# Patient Record
Sex: FEMALE | Race: BLACK OR AFRICAN AMERICAN | Employment: FULL TIME | URBAN - METROPOLITAN AREA
[De-identification: names, ages, dates, MRNs, and addresses within clinical notes are randomized per-mention and may not be internally consistent; named-entity substitution may affect disease eponyms.]

---

## 2020-09-16 ENCOUNTER — VIRTUAL VISIT (OUTPATIENT)
Dept: FAMILY MEDICINE CLINIC | Age: 55
End: 2020-09-16

## 2020-09-16 NOTE — PROGRESS NOTES
Patient thought she was seeing a psychiatrist today. She will keep Dr. Silva Simpson as her PCP. Visit was cancelled.

## 2020-11-19 ENCOUNTER — OFFICE VISIT (OUTPATIENT)
Dept: SURGERY | Age: 55
End: 2020-11-19
Payer: COMMERCIAL

## 2020-11-19 VITALS
BODY MASS INDEX: 40.53 KG/M2 | DIASTOLIC BLOOD PRESSURE: 61 MMHG | TEMPERATURE: 97.1 F | SYSTOLIC BLOOD PRESSURE: 104 MMHG | HEART RATE: 67 BPM | WEIGHT: 237.4 LBS | HEIGHT: 64 IN | OXYGEN SATURATION: 99 %

## 2020-11-19 DIAGNOSIS — E66.01 OBESITY, MORBID (HCC): ICD-10-CM

## 2020-11-19 DIAGNOSIS — L02.212 BACK ABSCESS: Primary | ICD-10-CM

## 2020-11-19 PROCEDURE — 99203 OFFICE O/P NEW LOW 30 MIN: CPT | Performed by: SURGERY

## 2020-11-19 PROCEDURE — 10061 I&D ABSCESS COMP/MULTIPLE: CPT | Performed by: SURGERY

## 2020-11-19 RX ORDER — LISINOPRIL AND HYDROCHLOROTHIAZIDE 20; 25 MG/1; MG/1
TABLET ORAL
COMMUNITY
Start: 2020-10-02

## 2020-11-19 RX ORDER — ACETAMINOPHEN 500 MG
TABLET ORAL
COMMUNITY

## 2020-11-19 RX ORDER — ERGOCALCIFEROL 1.25 MG/1
CAPSULE ORAL
COMMUNITY
Start: 2020-11-07

## 2020-11-19 RX ORDER — HYDROCODONE BITARTRATE AND ACETAMINOPHEN 5; 300 MG/1; MG/1
1 TABLET ORAL
Qty: 12 TAB | Refills: 0 | Status: SHIPPED | OUTPATIENT
Start: 2020-11-19 | End: 2020-11-22

## 2020-11-19 RX ORDER — NAPROXEN 125 MG/5ML
125 SUSPENSION ORAL 2 TIMES DAILY
COMMUNITY
End: 2021-03-31

## 2020-11-19 RX ORDER — THERA TABS 400 MCG
2 TAB ORAL DAILY
COMMUNITY

## 2020-11-19 RX ORDER — ESCITALOPRAM OXALATE 20 MG/1
20 TABLET ORAL
COMMUNITY
End: 2021-08-02

## 2020-11-19 NOTE — PROGRESS NOTES
General Surgery Consult    Reyna Fox  Admit date: (Not on file)    MRN: [id number]     : 1965     Age: 48 y.o. Attending Physician: Ambrose Lorea MD, [facility]      History of Present Illness:      Reyna Fox is a 48 y.o. female who was referred to me for evaluation of a left upper back abscess. The patient has stated that she had a lesion there for about 5 to 6 years or maybe longer most likely representing a sebaceous cyst that has never caused any problem until recently. She said that she have significant pain at the site of the abscess currently and that she went to the doctor at her work at [employer] and he prescribed her antibiotic which she started yesterday. She said that the abscess did not drain at all but it is causing significant pain especially when she touches it. She denies any fever or chills. The patient is morbidly obese. She has been to please prescribe her some narcotics because she is having significant pain and she said that Vicodin works for her.      Patient Active Problem List    Diagnosis Date Noted    Obesity, morbid ([address] 2020    KHLOE on CPAP 2016    Gastroesophageal reflux disease 2016    Primary osteoarthritis involving multiple joints 2016    Obesity (BMI 30-39.9) 2016    Idiopathic chronic gout without tophus 2016    Essential hypertension 2016     Past Medical History:   Diagnosis Date    Arthritis     GERD (gastroesophageal reflux disease)     Gout     Headache     Hypertension     Sleep apnea       Past Surgical History:   Procedure Laterality Date    HX BREAST REDUCTION      HX GYN      tubes tied    HX LAP GASTRIC BYPASS  2017      Social History     Tobacco Use    Smoking status: Never Smoker    Smokeless tobacco: Never Used   Substance Use Topics    Alcohol use: Yes     Comment: occasionally/socially      Social History     Tobacco Use   Smoking Status Never Smoker   Smokeless Tobacco Never Used     Family History   Problem Relation Age of Onset    Diabetes Mother     Hypertension Mother     Elevated Lipids Mother     Diabetes Father     Hypertension Father    Bryan Schwartz Elevated Lipids Father       Current Outpatient Medications   Medication Sig    lisinopril-hydroCHLOROthiazide (PRINZIDE, ZESTORETIC) 20-25 mg per tablet TAKE 1 TABLET BY MOUTH EVERY DAY    therapeutic multivitamin (THERAGRAN) tablet Take 1 Tab by mouth daily.  naproxen (NAPROSYN) 125 mg/5 mL suspension Take 125 mg by mouth two (2) times a day.  acetaminophen (Tylenol Extra Strength) 500 mg tablet Take  by mouth every six (6) hours as needed for Pain.  escitalopram oxalate (Lexapro) 20 mg tablet Take 20 mg by mouth daily.  CALCIUM CARBONATE/VITAMIN D3 (CALCIUM 600 + D,3, PO) Take  by mouth.  allopurinol (ZYLOPRIM) 300 mg tablet Take  by mouth daily.  montelukast (SINGULAIR) 10 mg tablet Take 10 mg by mouth daily.  diphenhydrAMINE (BENADRYL) 50 mg tablet Take 50 mg by mouth nightly as needed for Sleep.  cpap machine kit by Does Not Apply route.  ergocalciferol (ERGOCALCIFEROL) 1,250 mcg (50,000 unit) capsule TAKE ONE CAPSULE BY MOUTH WEEKLY FOR 7 DAYS    allopurinol (ZYLOPRIM) 100 mg tablet Take 1 Tab by mouth daily. Indications: GOUT    traMADol (ULTRAM) 50 mg tablet Take 50 mg by mouth two (2) times a day.  POTASSIUM CHLORIDE (KLOR-CON PO) Take 20 mEq by mouth.  topiramate (TOPAMAX) 25 mg tablet Take 25 mg by mouth daily.  colchicine 0.6 mg tablet Take 0.6 mg by mouth daily.  furosemide (LASIX) 80 mg tablet Take  by mouth daily.  CRANBERRY FRUIT EXTRACT (CRANBERRY CONCENTRATE PO) Take  by mouth.  LORATADINE PO Take 20 mg by mouth daily.  meloxicam (MOBIC) 7.5 mg tablet Take  by mouth daily.  omeprazole (PRILOSEC) 20 mg capsule Take 20 mg by mouth daily.  HYDROcodone-acetaminophen (VICODIN) 5-300 mg tablet Take  by mouth every four (4) hours as needed.     BUTALB/ACETAMINOPHEN/CAFFEINE (FIORICET PO) Take 1 Tab by mouth every six (6) hours as needed.  amLODIPine (NORVASC) 10 mg tablet Take 1 Tab by mouth daily.  lisinopril (PRINIVIL, ZESTRIL) 40 mg tablet Take 1 Tab by mouth daily. No current facility-administered medications for this visit. Allergies   Allergen Reactions    Augmentin [Amoxicillin-Pot Clavulanate] Diarrhea    Bactrim [Sulfamethoprim Ss] Itching          Review of Systems:  Constitutional: negative  Eyes: negative  Ears, Nose, Mouth, Throat, and Face: negative  Respiratory: negative  Cardiovascular: negative  Gastrointestinal: negative  Genitourinary:negative  Integument/Breast: positive for Back abscess  Hematologic/Lymphatic: negative  Musculoskeletal:negative  Neurological: negative  Behavioral/Psychiatric: negative  Endocrine: negative  Allergic/Immunologic: negative    Objective:     Visit Vitals  /61 (BP 1 Location: Right arm, BP Patient Position: Sitting)   Pulse 67   Temp 97.1 °F (36.2 °C) (Skin)   Ht 5' 4\" (1.626 m)   Wt 107.7 kg (237 lb 6.4 oz)   SpO2 99%   BMI 40.75 kg/m²       Physical Exam:      General:  in no apparent distress, alert, oriented times 3, afebrile and normal vitals   Eyes:  conjunctivae and sclerae normal, pupils equal, round, reactive to light   Throat & Neck: no erythema or exudates noted and neck supple and symmetrical; no palpable masses   Lungs:   clear to auscultation bilaterally   Heart:  Regular rate and rhythm   Abdomen:   rounded and obese, soft, nontender, nondistended, no masses or organomegaly   Extremities: extremities normal, atraumatic, no cyanosis or edema   back: There is a abscess that is localized in the left upper back that is tender with fluctuation and overlying skin erythema. It is very tender to palpation. There is no drainage.        Imaging and Lab Review:     CBC:   Lab Results   Component Value Date/Time    WBC 10.8 08/24/2016 12:34 PM    RBC 4.35 08/24/2016 12:34 PM HGB 12.8 08/24/2016 12:34 PM    HCT 38.8 08/24/2016 12:34 PM    PLATELET 376 15/62/7563 12:34 PM     BMP:   Lab Results   Component Value Date/Time    Glucose 94 10/06/2016 10:36 AM    Sodium 141 10/06/2016 10:36 AM    Potassium 4.2 10/06/2016 10:36 AM    Chloride 102 10/06/2016 10:36 AM    CO2 25 10/06/2016 10:36 AM    BUN 24 (H) 10/06/2016 10:36 AM    Creatinine 0.8 10/06/2016 10:36 AM    Calcium 9.7 10/06/2016 10:36 AM     CMP:  Lab Results   Component Value Date/Time    Glucose 94 10/06/2016 10:36 AM    Sodium 141 10/06/2016 10:36 AM    Potassium 4.2 10/06/2016 10:36 AM    Chloride 102 10/06/2016 10:36 AM    CO2 25 10/06/2016 10:36 AM    BUN 24 (H) 10/06/2016 10:36 AM    Creatinine 0.8 10/06/2016 10:36 AM    Calcium 9.7 10/06/2016 10:36 AM    Anion gap 14.0 10/06/2016 10:36 AM    Alk. phosphatase 62 10/06/2016 10:36 AM    Protein, total 6.8 10/06/2016 10:36 AM    Albumin 4.0 10/06/2016 10:36 AM    Globulin 2.8 10/06/2016 10:36 AM    A-G Ratio 1.4 10/06/2016 10:36 AM       No results found for this or any previous visit (from the past 24 hour(s)). images and reports reviewed    Assessment:   Reyna Fox is a 48 y.o. female who is presenting with a large left upper back abscess most likely representing a sebaceous cyst that is infected since the patient had a lump there for years. The patient was already started on antibiotic yesterday but this need to be drained. We can do it under local anesthesia in the office. I explained to the patient about the surgery and the need for packing on a daily basis which she agreed.      Plan:     I did incision and drainage of a back abscess in the office    Please call me if you have any questions (cell phone: [phone])     Signed By: Ambrose Loera MD     November 19, 2020

## 2020-11-19 NOTE — PROGRESS NOTES
A consent was taken from the patient  A timeout was performed  The area was infiltrated with 1% lidocaine  A skin incision was performed with 11 blade and pus was drained  The area was irrigated multiple times and we drained as much as possible and then packed with a gauze

## 2020-11-19 NOTE — PROGRESS NOTES
Merlinda Lather is a 48 y.o. female (: 1965) presenting to address:    Chief Complaint   Patient presents with    New Patient     Abscess on back       Medication list and allergies have been reviewed with Merlinda Lather and updated as of today's date. I have gone over all Medical, Surgical and Social History with Toniarhiannon Chente and updated/added the information accordingly.

## 2020-11-30 ENCOUNTER — OFFICE VISIT (OUTPATIENT)
Dept: SURGERY | Age: 55
End: 2020-11-30
Payer: COMMERCIAL

## 2020-11-30 VITALS
OXYGEN SATURATION: 100 % | BODY MASS INDEX: 40.39 KG/M2 | SYSTOLIC BLOOD PRESSURE: 109 MMHG | DIASTOLIC BLOOD PRESSURE: 66 MMHG | HEART RATE: 55 BPM | TEMPERATURE: 97.1 F | HEIGHT: 64 IN | WEIGHT: 236.6 LBS

## 2020-11-30 DIAGNOSIS — L02.212 BACK ABSCESS: ICD-10-CM

## 2020-11-30 DIAGNOSIS — E66.01 OBESITY, MORBID (HCC): Primary | ICD-10-CM

## 2020-11-30 PROCEDURE — 99024 POSTOP FOLLOW-UP VISIT: CPT | Performed by: SURGERY

## 2020-11-30 NOTE — PROGRESS NOTES
Jed Banda is a 48 y.o. female (: 1965) presenting to address:    Chief Complaint   Patient presents with    Surgical Follow-up     Incision and Drainage of back cyst in office 20       Medication list and allergies have been reviewed with Jed Banda and updated as of today's date. I have gone over all Medical, Surgical and Social History with Jed Banda and updated/added the information accordingly. 1. Have you been to the ER, Urgent Care or Hospitalized since your last visit? NO      2. Have you followed up with your PCP or any other Physicians since your procedure/ last office visit?    NO

## 2020-11-30 NOTE — PROGRESS NOTES
Patient seen and examined she is doing relatively well. She has stated that there is some itching around the wound. She has been packing it every day. She said her sister or her coworkers are doing it. She denies any fever or chills. On exam the area has almost healed completely with a great granulation tissue and the opening is less than half centimeter and less than 3 to 4 mm in depth. There is no induration or fluctuation. There is no tenderness on exam.  I reassured the patient that the healing is great and she need to continue with the packing though most likely in less than a week it can be difficult to pack and there is no need to pack anymore.   Follow-up with me as needed

## 2020-12-08 ENCOUNTER — OFFICE VISIT (OUTPATIENT)
Dept: ORTHOPEDIC SURGERY | Age: 55
End: 2020-12-08
Payer: COMMERCIAL

## 2020-12-08 VITALS
TEMPERATURE: 97.1 F | SYSTOLIC BLOOD PRESSURE: 111 MMHG | DIASTOLIC BLOOD PRESSURE: 77 MMHG | HEART RATE: 58 BPM | BODY MASS INDEX: 40.53 KG/M2 | RESPIRATION RATE: 16 BRPM | OXYGEN SATURATION: 100 % | WEIGHT: 237.4 LBS | HEIGHT: 64 IN

## 2020-12-08 DIAGNOSIS — M25.562 ACUTE PAIN OF LEFT KNEE: ICD-10-CM

## 2020-12-08 DIAGNOSIS — M17.12 PRIMARY OSTEOARTHRITIS OF LEFT KNEE: Primary | ICD-10-CM

## 2020-12-08 PROCEDURE — 20611 DRAIN/INJ JOINT/BURSA W/US: CPT | Performed by: ORTHOPAEDIC SURGERY

## 2020-12-08 PROCEDURE — 73560 X-RAY EXAM OF KNEE 1 OR 2: CPT | Performed by: ORTHOPAEDIC SURGERY

## 2020-12-08 PROCEDURE — 99203 OFFICE O/P NEW LOW 30 MIN: CPT | Performed by: ORTHOPAEDIC SURGERY

## 2020-12-08 RX ORDER — TRIAMCINOLONE ACETONIDE 40 MG/ML
40 INJECTION, SUSPENSION INTRA-ARTICULAR; INTRAMUSCULAR ONCE
Status: COMPLETED | OUTPATIENT
Start: 2020-12-08 | End: 2020-12-08

## 2020-12-08 RX ADMIN — TRIAMCINOLONE ACETONIDE 40 MG: 40 INJECTION, SUSPENSION INTRA-ARTICULAR; INTRAMUSCULAR at 11:09

## 2020-12-08 NOTE — PROGRESS NOTES
Natividad Rod  1965   Chief Complaint   Patient presents with    Knee Pain     left knee pain        HISTORY OF PRESENT ILLNESS  Natividad Rod is a 48 y.o. female who presents today for evaluation of left knee pain. She rates her pain 4/10 today. Pain has been present for a couple of months. Pt was doing \"zig-zag\" walking on the track when her knee moved out from under her. Notes swelling and giving way sensation. Has had previous knee injections by rheumatologist around 3 years ago. Patient describes the pain as aching and popping and locking that is Intermittent in nature. Symptoms are worse with stairs, walking, standing, bending, Activity and is better with  Tylenol and Advil, Ice, Elevation. Associated symptoms include Swelling. Since problem started, it: is unchanged. Pain does not wake patient up at night. Has taken no meds for the problem. Has tried following treatments: Injections:NO; Brace:NO;  Therapy:NO; Cane/Crutch:NO       Allergies   Allergen Reactions    Augmentin [Amoxicillin-Pot Clavulanate] Diarrhea    Bactrim [Sulfamethoprim Ss] Itching        Past Medical History:   Diagnosis Date    Arthritis     GERD (gastroesophageal reflux disease)     Gout     Headache     Hypertension     Sleep apnea       Social History     Socioeconomic History    Marital status:      Spouse name: Not on file    Number of children: Not on file    Years of education: Not on file    Highest education level: Not on file   Occupational History    Not on file   Social Needs    Financial resource strain: Not on file    Food insecurity     Worry: Not on file     Inability: Not on file    Transportation needs     Medical: Not on file     Non-medical: Not on file   Tobacco Use    Smoking status: Never Smoker    Smokeless tobacco: Never Used   Substance and Sexual Activity    Alcohol use: Yes     Comment: occasionally/socially    Drug use: No    Sexual activity: Yes     Partners: Male Lifestyle    Physical activity     Days per week: Not on file     Minutes per session: Not on file    Stress: Not on file   Relationships    Social connections     Talks on phone: Not on file     Gets together: Not on file     Attends Tenriism service: Not on file     Active member of club or organization: Not on file     Attends meetings of clubs or organizations: Not on file     Relationship status: Not on file    Intimate partner violence     Fear of current or ex partner: Not on file     Emotionally abused: Not on file     Physically abused: Not on file     Forced sexual activity: Not on file   Other Topics Concern    Not on file   Social History Narrative    Not on file      Past Surgical History:   Procedure Laterality Date    HX BREAST REDUCTION  2010    HX GYN      tubes tied    HX LAP GASTRIC BYPASS  2017    I&D ABCESS SIMP Left 11/24/2020    I and D of left back abscess      Family History   Problem Relation Age of Onset    Diabetes Mother     Hypertension Mother     Elevated Lipids Mother     Diabetes Father     Hypertension Father     Elevated Lipids Father       Current Outpatient Medications   Medication Sig    ergocalciferol (ERGOCALCIFEROL) 1,250 mcg (50,000 unit) capsule TAKE ONE CAPSULE BY MOUTH WEEKLY FOR 7 DAYS    lisinopril-hydroCHLOROthiazide (PRINZIDE, ZESTORETIC) 20-25 mg per tablet TAKE 1 TABLET BY MOUTH EVERY DAY    naproxen (NAPROSYN) 125 mg/5 mL suspension Take 125 mg by mouth two (2) times a day.  acetaminophen (Tylenol Extra Strength) 500 mg tablet Take  by mouth every six (6) hours as needed for Pain.  allopurinol (ZYLOPRIM) 100 mg tablet Take 1 Tab by mouth daily. Indications: GOUT    furosemide (LASIX) 80 mg tablet Take  by mouth daily.  montelukast (SINGULAIR) 10 mg tablet Take 10 mg by mouth daily.  cpap machine kit by Does Not Apply route.  lisinopril (PRINIVIL, ZESTRIL) 40 mg tablet Take 1 Tab by mouth daily.     therapeutic multivitamin [facility]) tablet Take 1 Tab by mouth daily.  escitalopram oxalate (Lexapro) 20 mg tablet Take 20 mg by mouth daily.  traMADol (ULTRAM) 50 mg tablet Take 50 mg by mouth two (2) times a day.  POTASSIUM CHLORIDE (KLOR-CON PO) Take 20 mEq by mouth.  topiramate (TOPAMAX) 25 mg tablet Take 25 mg by mouth daily.  CALCIUM CARBONATE/VITAMIN D3 (CALCIUM 600 + D,3, PO) Take  by mouth.  colchicine 0.6 mg tablet Take 0.6 mg by mouth daily.  allopurinol (ZYLOPRIM) 300 mg tablet Take  by mouth daily.  CRANBERRY FRUIT EXTRACT (CRANBERRY CONCENTRATE PO) Take  by mouth.  LORATADINE PO Take 20 mg by mouth daily.  meloxicam (MOBIC) 7.5 mg tablet Take  by mouth daily.  omeprazole (PRILOSEC) 20 mg capsule Take 20 mg by mouth daily.  HYDROcodone-acetaminophen (VICODIN) 5-300 mg tablet Take  by mouth every four (4) hours as needed.  BUTALB/ACETAMINOPHEN/CAFFEINE (FIORICET PO) Take 1 Tab by mouth every six (6) hours as needed.  diphenhydrAMINE (BENADRYL) 50 mg tablet Take 50 mg by mouth nightly as needed for Sleep.  amLODIPine (NORVASC) 10 mg tablet Take 1 Tab by mouth daily. No current facility-administered medications for this visit. REVIEW OF SYSTEM   Patient denies: Weight loss, Fever/Chills, HA, Visual changes, Fatigue, Chest pain, SOB, Abdominal pain, N/V/D/C, Blood in stool or urine, Edema. Pertinent positive as above in HPI. All others were negative    PHYSICAL EXAM:   Visit Vitals  /77 (BP 1 Location: Right arm, BP Patient Position: Sitting)   Pulse (!) 58   Temp 97.1 °F (36.2 °C) (Temporal)   Resp 16   Ht 5' 4\" (1.626 m)   Wt 237 lb 6.4 oz (107.7 kg)   SpO2 100%   BMI 40.75 kg/m²     The patient is a well-developed, well-nourished female   in no acute distress. The patient is alert and oriented times three. The patient is alert and oriented times three.  Mood and affect are normal.  LYMPHATIC: lymph nodes are not enlarged and are within normal limits  SKIN: normal in color and non tender to palpation. There are no bruises or abrasions noted. NEUROLOGICAL: Motor sensory exam is within normal limits. Reflexes are equal bilaterally. There is normal sensation to pinprick and light touch  MUSCULOSKELETAL:  Examination Left knee   Skin Intact   Range of motion    Effusion +   Medial joint line tenderness +   Lateral joint line tenderness -   Tenderness Pes Bursa -   Tenderness insertion MCL -   Tenderness insertion LCL -   Ladis +   Patella crepitus +   Patella grind +   Lachman -   Pivot shift -   Anterior drawer -   Posterior drawer -   Varus stress -   Valgus stress -   Neurovascular Intact   Calf Swelling and Tenderness to Palpation -   Faith's Test -   Hamstring Cord Tightness -       PROCEDURE: Left Knee Injection with Ultrasound Guidance  Indication:Left Knee pain/swelling    After sterile prep, 4 cc of Xylocaine and 1 cc of Kenalog were injected into the left knee. Ultrasound images captured using [employer] Ultrasound machine and scanned into patient's chart.        [facility] - [facility]  OFFICE PROCEDURE PROGRESS NOTE        Chart reviewed for the following:  Sulema Monge M.D, have reviewed the History, Physical and updated the Allergic reactions for [facility] performed immediately prior to start of procedure:  Sulema Monge M.D, have performed the following reviews on [facility] prior to the start of the procedure:            * Patient was identified by name and date of birth   * Agreement on procedure being performed was verified  * Risks and Benefits explained to the patient  * Procedure site verified and marked as necessary  * Patient was positioned for comfort  * Consent was signed and verified     Time: 10:07 AM     Date of procedure: 12/8/2020    Procedure performed by:  Andrzej Delgado M.D    Provider assisted by: (see medication administration)    How tolerated by patient: tolerated the procedure well with no complications    Comments: none      IMAGING: XR of left knee obtained in the office dated 12/08/2020 was reviewed and read by Dr. Romero Perdomo: Marked degenerative arthritis in the medial compartment. IMPRESSION:      ICD-10-CM ICD-9-CM    1. Primary osteoarthritis of left knee  M17.12 715.16 triamcinolone acetonide (KENALOG-40) 40 mg/mL injection 40 mg      US GUIDE INJ/ASP/ARTHRO LG JNT/BURSA   2. Acute pain of left knee  M25.562 719.46 AMB POC XRAY, KNEE; 1/2 VIEWS        PLAN:  1. Pt presents today with left knee pain due to primary OA and I would like to try a cortisone injection today. Risk factors include: htn, BMI>40   2. No ultrasound exam indicated today  3. Yes cortisone injection indicated today L KNEE US  4. No Physical/Occupational Therapy indicated today  5. No diagnostic test indicated today:   6. No durable medical equipment indicated today  7. No referral indicated today   8. No medications indicated today:   9. No Narcotic indicated today       RTC 3 weeks if pain continues      Scribed by Courtney Ornelas) as dictated by Dory Villalobos MD    I, Dr. Dory Villalobos, confirm that all documentation is accurate.     Dory Villalobos M.D.   [facility]

## 2021-03-31 ENCOUNTER — HOSPITAL ENCOUNTER (OUTPATIENT)
Dept: LAB | Age: 56
Discharge: HOME | End: 2021-03-31
Payer: COMMERCIAL

## 2021-03-31 PROCEDURE — U0003 INFECTIOUS AGENT DETECTION BY NUCLEIC ACID (DNA OR RNA); SEVERE ACUTE RESPIRATORY SYNDROME CORONAVIRUS 2 (SARS-COV-2) (CORONAVIRUS DISEASE [COVID-19]), AMPLIFIED PROBE TECHNIQUE, MAKING USE OF HIGH THROUGHPUT TECHNOLOGIES AS DESCRIBED BY CMS-2020-01-R: HCPCS

## 2021-03-31 RX ORDER — ESCITALOPRAM OXALATE 10 MG/1
10 TABLET ORAL
COMMUNITY
End: 2021-08-02

## 2021-03-31 RX ORDER — DEXTROAMPHETAMINE SULFATE 10 MG/1
10 TABLET ORAL DAILY
COMMUNITY
End: 2021-08-02

## 2021-03-31 RX ORDER — POLYETHYLENE GLYCOL 3350 17 G/17G
17 POWDER, FOR SOLUTION ORAL DAILY
COMMUNITY

## 2021-03-31 RX ORDER — CETIRIZINE HCL 10 MG
10 TABLET ORAL DAILY
COMMUNITY

## 2021-03-31 RX ORDER — PHENTERMINE HYDROCHLORIDE 30 MG/1
30 CAPSULE ORAL
COMMUNITY
End: 2021-08-02

## 2021-04-02 ENCOUNTER — ANESTHESIA EVENT (OUTPATIENT)
Dept: ENDOSCOPY | Age: 56
End: 2021-04-02
Payer: COMMERCIAL

## 2021-04-02 LAB — SARS-COV-2, COV2NT: NOT DETECTED

## 2021-04-05 ENCOUNTER — HOSPITAL ENCOUNTER (OUTPATIENT)
Age: 56
Setting detail: OUTPATIENT SURGERY
Discharge: HOME | End: 2021-04-05
Attending: INTERNAL MEDICINE | Admitting: INTERNAL MEDICINE
Payer: COMMERCIAL

## 2021-04-05 ENCOUNTER — ANESTHESIA (OUTPATIENT)
Dept: ENDOSCOPY | Age: 56
End: 2021-04-05
Payer: COMMERCIAL

## 2021-04-05 VITALS
BODY MASS INDEX: 41.57 KG/M2 | HEART RATE: 70 BPM | RESPIRATION RATE: 18 BRPM | WEIGHT: 234.6 LBS | OXYGEN SATURATION: 100 % | HEIGHT: 63 IN | SYSTOLIC BLOOD PRESSURE: 100 MMHG | TEMPERATURE: 98.5 F | DIASTOLIC BLOOD PRESSURE: 68 MMHG

## 2021-04-05 LAB — HCG UR QL: NEGATIVE

## 2021-04-05 PROCEDURE — 76040000019: Performed by: INTERNAL MEDICINE

## 2021-04-05 PROCEDURE — 74011250636 HC RX REV CODE- 250/636: Performed by: NURSE ANESTHETIST, CERTIFIED REGISTERED

## 2021-04-05 PROCEDURE — 2709999900 HC NON-CHARGEABLE SUPPLY: Performed by: INTERNAL MEDICINE

## 2021-04-05 PROCEDURE — 76060000031 HC ANESTHESIA FIRST 0.5 HR: Performed by: INTERNAL MEDICINE

## 2021-04-05 PROCEDURE — 00813 ANES UPR LWR GI NDSC PX: CPT | Performed by: ANESTHESIOLOGY

## 2021-04-05 PROCEDURE — 74011250636 HC RX REV CODE- 250/636: Performed by: ANESTHESIOLOGY

## 2021-04-05 PROCEDURE — 81025 URINE PREGNANCY TEST: CPT

## 2021-04-05 PROCEDURE — 77030008565 HC TBNG SUC IRR ERBE -B: Performed by: INTERNAL MEDICINE

## 2021-04-05 PROCEDURE — 74011000250 HC RX REV CODE- 250: Performed by: ANESTHESIOLOGY

## 2021-04-05 RX ORDER — FAMOTIDINE 20 MG/1
20 TABLET, FILM COATED ORAL ONCE
Status: DISCONTINUED | OUTPATIENT
Start: 2021-04-05 | End: 2021-04-05 | Stop reason: CLARIF

## 2021-04-05 RX ORDER — SODIUM CHLORIDE, SODIUM LACTATE, POTASSIUM CHLORIDE, CALCIUM CHLORIDE 600; 310; 30; 20 MG/100ML; MG/100ML; MG/100ML; MG/100ML
75 INJECTION, SOLUTION INTRAVENOUS CONTINUOUS
Status: DISCONTINUED | OUTPATIENT
Start: 2021-04-05 | End: 2021-04-05 | Stop reason: HOSPADM

## 2021-04-05 RX ORDER — PROPOFOL 10 MG/ML
INJECTION, EMULSION INTRAVENOUS AS NEEDED
Status: DISCONTINUED | OUTPATIENT
Start: 2021-04-05 | End: 2021-04-05 | Stop reason: HOSPADM

## 2021-04-05 RX ORDER — SODIUM CHLORIDE 0.9 % (FLUSH) 0.9 %
5-40 SYRINGE (ML) INJECTION EVERY 8 HOURS
Status: DISCONTINUED | OUTPATIENT
Start: 2021-04-05 | End: 2021-04-05 | Stop reason: HOSPADM

## 2021-04-05 RX ORDER — SODIUM CHLORIDE 0.9 % (FLUSH) 0.9 %
5-40 SYRINGE (ML) INJECTION AS NEEDED
Status: DISCONTINUED | OUTPATIENT
Start: 2021-04-05 | End: 2021-04-05 | Stop reason: HOSPADM

## 2021-04-05 RX ADMIN — FAMOTIDINE 20 MG: 10 INJECTION INTRAVENOUS at 08:12

## 2021-04-05 RX ADMIN — PROPOFOL 100 MG: 10 INJECTION, EMULSION INTRAVENOUS at 08:36

## 2021-04-05 RX ADMIN — PROPOFOL 100 MG: 10 INJECTION, EMULSION INTRAVENOUS at 08:38

## 2021-04-05 RX ADMIN — SODIUM CHLORIDE, SODIUM LACTATE, POTASSIUM CHLORIDE, AND CALCIUM CHLORIDE 75 ML/HR: 600; 310; 30; 20 INJECTION, SOLUTION INTRAVENOUS at 08:12

## 2021-04-05 RX ADMIN — PROPOFOL 50 MG: 10 INJECTION, EMULSION INTRAVENOUS at 08:31

## 2021-04-05 NOTE — ANESTHESIA POSTPROCEDURE EVALUATION
Procedure(s):  UPPER ENDOSCOPY  COLONOSCOPY. MAC    Anesthesia Post Evaluation      Multimodal analgesia: multimodal analgesia used between 6 hours prior to anesthesia start to PACU discharge  Patient location during evaluation: bedside  Patient participation: complete - patient participated  Level of consciousness: awake  Pain management: adequate  Airway patency: patent  Anesthetic complications: no  Cardiovascular status: stable  Respiratory status: acceptable  Hydration status: acceptable  Post anesthesia nausea and vomiting:  controlled  Final Post Anesthesia Temperature Assessment:  Normothermia (36.0-37.5 degrees C)      INITIAL Post-op Vital signs: No vitals data found for the desired time range.

## 2021-04-05 NOTE — H&P
Chief Complaint: Epigastric pain and CRCS    History of present illness: History of gastric bypass. Pain epigastrium. Localized no NSAIDs use. No vomiting. History of long standing heartburn. PMH:   Past Medical History:   Diagnosis Date    Arthritis     GERD (gastroesophageal reflux disease)     Gout     Headache     Hypertension     Sleep apnea     Uses CPAP     Allergies:    Allergies   Allergen Reactions    Augmentin [Amoxicillin-Pot Clavulanate] Diarrhea    Bactrim [Sulfamethoprim Ss] Itching     Medications:   Current Facility-Administered Medications:     sodium chloride (NS) flush 5-40 mL, 5-40 mL, IntraVENous, Q8H, Micha Love CRNA    sodium chloride (NS) flush 5-40 mL, 5-40 mL, IntraVENous, PRN, Miosés Sherman CRNA    lactated Ringers infusion, 75 mL/hr, IntraVENous, CONTINUOUS, Moisés Sherman CRNA, Last Rate: 75 mL/hr at 04/05/21 0812, 75 mL/hr at 04/05/21 0812  FH:   Family History   Problem Relation Age of Onset    Diabetes Mother     Hypertension Mother    [facility] Elevated Lipids Mother     Diabetes Father     Hypertension Father    [facility] Elevated Lipids Father      Social:   Social History     Socioeconomic History    Marital status:      Spouse name: Not on file    Number of children: Not on file    Years of education: Not on file    Highest education level: Not on file   Tobacco Use    Smoking status: Never Smoker    Smokeless tobacco: Never Used   Substance and Sexual Activity    Alcohol use: Yes     Comment: occasionally/socially    Drug use: Never    Sexual activity: Yes     Partners: Male     Surgical H:   Past Surgical History:   Procedure Laterality Date    HX BREAST REDUCTION  2010    HX GASTRIC BYPASS  10/2017    HX LAP GASTRIC BYPASS  2017    HX TUBAL LIGATION      I&D ABCESS SIMP Left 11/24/2020    I and D of left back abscess       ROS: positive for dyspepsia, reflux symptoms, constipation and abdominal pain    Physical Exam:   Visit Vitals  BP 117/76   Pulse 70   Temp 98.4 °F (36.9 °C)   Resp 14   Ht 5' 3\" (1.6 m)   Wt 106.4 kg (234 lb 9.6 oz)   SpO2 99%   Breastfeeding No   BMI 41.56 kg/m²     General appearance: alert, no distress overweight  Eyes: pupils equal and reactive, extraocular eye movements intact  Nodes: No gross adenopathy in neck. Skin: no spider angiomata, jaundice, palmar erythema   Respiratory: clear to auscultation bilaterally  Cardiovascular: regular heart rate, no murmurs, no JVD, normal rate and regular rhythm  Abdomen: soft, non-tender, liver not enlarged, spleen not palpable, no obvious ascites  Extremities: no muscle wasting, no gross arthritic changes  Neurologic: alert and oriented, cranial nerves grossly intact, no asterixis    Labs:   Recent Results (from the past 24 hour(s))   HCG URINE, QL. - POC    Collection Time: 04/05/21  7:17 AM   Result Value Ref Range    Pregnancy test,urine (POC) Negative NEG           Imp/ Plan: Will proceed with EGD and colonoscopy as planned. Risk benefits alternative including but not limited to infection, bleeding, perforation of viscous, allergic reaction and resultant morbidity and mortality was discussed. Chance of missing a significant lesion due to various reasons were discussed.       Pedro Bess MD  [facility] 1946, [address]

## 2021-04-05 NOTE — ANESTHESIA PREPROCEDURE EVALUATION
Relevant Problems   No relevant active problems       Anesthetic History   No history of anesthetic complications            Review of Systems / Medical History  Patient summary reviewed, nursing notes reviewed and pertinent labs reviewed    Pulmonary        Sleep apnea           Neuro/Psych   Within defined limits           Cardiovascular    Hypertension              Exercise tolerance: >4 METS     GI/Hepatic/Renal     GERD           Endo/Other        Morbid obesity and arthritis     Other Findings              Physical Exam    Airway  Mallampati: II  TM Distance: 4 - 6 cm  Neck ROM: normal range of motion   Mouth opening: Normal     Cardiovascular  Regular rate and rhythm,  S1 and S2 normal,  no murmur, click, rub, or gallop  Rhythm: regular  Rate: normal         Dental  No notable dental hx       Pulmonary  Breath sounds clear to auscultation               Abdominal  GI exam deferred       Other Findings            Anesthetic Plan    ASA: 3  Anesthesia type: MAC          Induction: Intravenous  Anesthetic plan and risks discussed with: Patient

## 2021-04-05 NOTE — DISCHARGE INSTRUCTIONS
Patient Education        Esophagitis: Care Instructions  Your Care Instructions     Esophagitis (say \"ih-sof-uh-JY-tus\") is irritation of the esophagus, the tube that carries food from your throat to your stomach. Acid reflux is the most common cause of this condition. When you have reflux, stomach acid and juices flow upward. This can cause pain or a burning feeling in your chest. You may have a sore throat. It may be hard to swallow. Other causes of this condition include some medicines and supplements. Allergies or an infection can also cause it. Your doctor will ask about your symptoms and past health. He or she might do tests to find the cause of your symptoms. Treatment depends on what is causing the problem. Treatment might include changing your diet or taking medicine to relieve your symptoms. It might also include changing a medicine that is causing your symptoms. If you have reflux, medicine that reduces the stomach acid helps your body heal. It might take 1 to 3 weeks to heal.  Follow-up care is a key part of your treatment and safety. Be sure to make and go to all appointments, and call your doctor if you are having problems. It's also a good idea to know your test results and keep a list of the medicines you take. How can you care for yourself at home? · If you have acid reflux, your doctor may recommend that you:  ? Eat several small meals instead of two or three large meals. After you eat, wait 2 to 3 hours before you lie down. ? Avoid chocolate, mint, alcohol, and spicy foods. ? Don't smoke or use smokeless tobacco. Smoking can make this condition worse. If you need help quitting, talk to your doctor about stop-smoking programs and medicines. These can increase your chances of quitting for good. ? Raise the head of your bed 6 to 8 inches if you have symptoms at night. ? Lose weight if you are overweight. ? Take an over-the-counter antacid, such as Maalox, Mylanta, or Tums.  Be careful when you take over-the-counter antacid medicines. Many of these medicines have aspirin in them. Read the label to make sure that you are not taking more than the recommended dose. Too much aspirin can be harmful. ? Take stronger acid reducers. Examples are famotidine (such as Pepcid) and omeprazole (such as Prilosec). · If your condition is caused by infection, allergy, or other problems, use the medicine or treatments that your doctor recommends. · Be safe with medicines. Take your medicines exactly as prescribed. Call your doctor if you think you are having a problem with your medicine. When should you call for help? Call your doctor now or seek immediate medical care if:    · You have new or worse belly pain.     · You are vomiting. Watch closely for changes in your health, and be sure to contact your doctor if:    · You have new or worse symptoms of reflux.     · You have trouble or pain swallowing.     · You are losing weight.     · You do not get better as expected. Where can you learn more? Go to http://www.gray.com/  Enter N977 in the search box to learn more about \"Esophagitis: Care Instructions. \"  Current as of: April 15, 2020               Content Version: 12.8  © 8398-7295 [employer]. Care instructions adapted under license by [employer] (which disclaims liability or warranty for this information). If you have questions about a medical condition or this instruction, always ask your healthcare professional. [place] [address] any warranty or liability for your use of this information. Patient Education        Hiatal Hernia: Care Instructions  Your Care Instructions  A hiatal hernia occurs when part of the stomach bulges into the chest cavity. A hiatal hernia may allow stomach acid and juices to back up into the esophagus (acid reflux). This can cause a feeling of burning, warmth, heat, or pain behind the breastbone.  This feeling may often occur after you eat, soon after you lie down, or when you bend forward, and it may come and go. You also may have a sour taste in your mouth. These symptoms are commonly known as heartburn or reflux. But not all hiatal hernias cause symptoms. Follow-up care is a key part of your treatment and safety. Be sure to make and go to all appointments, and call your doctor if you are having problems. It's also a good idea to know your test results and keep a list of the medicines you take. How can you care for yourself at home? · Take your medicines exactly as prescribed. Call your doctor if you think you are having a problem with your medicine. · Do not take aspirin or other nonsteroidal anti-inflammatory drugs (NSAIDs), such as ibuprofen (Advil, Motrin) or naproxen (Aleve), unless your doctor says it is okay. Ask your doctor what you can take for pain. · Your doctor may recommend over-the-counter medicine. For mild or occasional indigestion, antacids such as Tums, Gaviscon, Maalox, or Mylanta may help. Your doctor also may recommend over-the-counter acid reducers, such as famotidine (Pepcid AC), cimetidine (Tagamet HB), or omeprazole (Prilosec). Read and follow all instructions on the label. If you use these medicines often, talk with your doctor. · Change your eating habits. ? It's best to eat several small meals instead of two or three large meals. ? After you eat, wait 2 to 3 hours before you lie down. Late-night snacks aren't a good idea. ? Chocolate, mint, and alcohol can make heartburn worse. They relax the valve between the esophagus and the stomach. ? Spicy foods, foods that have a lot of acid (like tomatoes and oranges), and coffee can make heartburn symptoms worse in some people. If your symptoms are worse after you eat a certain food, you may want to stop eating that food to see if your symptoms get better.   · Do not smoke or chew tobacco.  · If you get heartburn at night, raise the head of your bed 6 to 8 inches by putting the frame on blocks or placing a foam wedge under the head of your mattress. (Adding extra pillows does not work.)  · Do not wear tight clothing around your middle. · Lose weight if you need to. Losing just 5 to 10 pounds can help. When should you call for help? Call your doctor now or seek immediate medical care if:    · You have new or worse belly pain.     · You are vomiting. Watch closely for changes in your health, and be sure to contact your doctor if:    · You have new or worse symptoms of indigestion.     · You have trouble or pain swallowing.     · You are losing weight.     · You do not get better as expected. Where can you learn more? Go to http://www.moore.com/  Enter T074 in the search box to learn more about \"Hiatal Hernia: Care Instructions. \"  Current as of: April 15, 2020               Content Version: 12.8  © 2006-2021 [employer]. Care instructions adapted under license by [employer] (which disclaims liability or warranty for this information). If you have questions about a medical condition or this instruction, always ask your healthcare professional. [place] [address] any warranty or liability for your use of this information. Patient Education        Colon Polyps: Care Instructions  Your Care Instructions     Colon polyps are growths in the colon or the rectum. The cause of most colon polyps is not known, and most people who get them do not have any problems. But a certain kind can turn into cancer. For this reason, regular testing for colon polyps is important for people as they get older. It is also important for anyone who has an increased risk for colon cancer. Polyps are usually found through routine colon cancer screening tests. Although most colon polyps are not cancerous, they are usually removed and then tested for cancer.  Screening for colon cancer saves lives because the cancer can usually be cured if it is caught early. If you have a polyp that is the type that can turn into cancer, you may need more tests to examine your entire colon. The doctor will remove any other polyps that he or she finds, and you will be tested more often. Follow-up care is a key part of your treatment and safety. Be sure to make and go to all appointments, and call your doctor if you are having problems. It's also a good idea to know your test results and keep a list of the medicines you take. How can you care for yourself at home? Regular exams to look for colon polyps are the best way to prevent polyps from turning into colon cancer. These can include stool tests, sigmoidoscopy, colonoscopy, and CT colonography. Talk with your doctor about a testing schedule that is right for you. To prevent polyps  There is no home treatment that can prevent colon polyps. But these steps may help lower your risk for cancer. · Stay active. Being active can help you get to and stay at a healthy weight. Try to exercise on most days of the week. Walking is a good choice. · Eat well. Choose a variety of vegetables, fruits, legumes (such as peas and beans), fish, poultry, and whole grains. · Do not smoke. If you need help quitting, talk to your doctor about stop-smoking programs and medicines. These can increase your chances of quitting for good. · If you drink alcohol, limit how much you drink. Limit alcohol to 2 drinks a day for men and 1 drink a day for women. When should you call for help? Call your doctor now or seek immediate medical care if:    · You have severe belly pain.     · Your stools are maroon or very bloody. Watch closely for changes in your health, and be sure to contact your doctor if:    · You have a fever.     · You have nausea or vomiting.     · You have a change in bowel habits (new constipation or diarrhea).     · Your symptoms get worse or are not improving as expected. Where can you learn more? Go to http://www.zeenworld.com/  Enter C571 in the search box to learn more about \"Colon Polyps: Care Instructions. \"  Current as of: December 17, 2020               Content Version: 12.8  © 8999-9469 [employer]. Care instructions adapted under license by [employer] (which disclaims liability or warranty for this information). If you have questions about a medical condition or this instruction, always ask your healthcare professional. [place] [address] any warranty or liability for your use of this information.        .Patient armband removed and shredded

## 2021-04-05 NOTE — PROCEDURES
[facility], [address]      Brief Procedure Note    Dewey Abebe  1965  [id number]    Date of Procedure: 4/5/2021    Preoperative diagnosis: Abdominal pain, epigastric:  789.06 - R10.13    Postoperative diagnosis:   EGD:S/P gastric bypass, hiatal hernia  [place]:normal    Type of Anesthesia: MAC (monitered anesthesia care)    Description of Findings: same as post op dx    Procedure: Procedure(s):  UPPER ENDOSCOPY  COLONOSCOPY    :  Dr. Rosa Isela Souza MD    Assistant(s): [unfilled]    Type of Anesthesia:MAC     EBL:None, no implants.     Specimens: * No specimens in log *    Findings: See printed and scanned procedure note    Complications: None    Dr. Rosa Isela Souza MD  4/5/2021  8:50 AM

## 2021-11-23 PROBLEM — D25.9 UTERINE FIBROID: Status: ACTIVE | Noted: 2021-11-23

## 2021-12-27 ENCOUNTER — OFFICE VISIT (OUTPATIENT)
Dept: ORTHOPEDIC SURGERY | Age: 56
End: 2021-12-27
Payer: COMMERCIAL

## 2021-12-27 VITALS — OXYGEN SATURATION: 100 % | BODY MASS INDEX: 50.37 KG/M2 | TEMPERATURE: 97.3 F | HEART RATE: 76 BPM | WEIGHT: 275.4 LBS

## 2021-12-27 DIAGNOSIS — M75.101 TEAR OF RIGHT ROTATOR CUFF, UNSPECIFIED TEAR EXTENT, UNSPECIFIED WHETHER TRAUMATIC: Primary | ICD-10-CM

## 2021-12-27 PROCEDURE — 99213 OFFICE O/P EST LOW 20 MIN: CPT | Performed by: ORTHOPAEDIC SURGERY

## 2021-12-27 NOTE — PROGRESS NOTES
Nabil Griggs  1965   Chief Complaint   Patient presents with    Shoulder Pain     right        HISTORY OF PRESENT ILLNESS  Nabil Griggs is a 55 y.o. female who presents today for reevaluation of right shoulder. She had a fall on July 14, 2021 and was seen in the ED. Patient rates pain as 6/10 today. On 12/21/2021 she was trying to open a door and she had pain that shot down the arm. Most pain is at night and interferes with her sleep. Previously had a cortisone injection a couple of months ago. Patient denies any fever, chills, chest pain, shortness of breath or calf pain. The remainder of the review of systems is negative. There are no new illness or injuries to report since last seen in the office. There are no changes to medications, allergies, family or social history. PHYSICAL EXAM:   Visit Vitals  Pulse 76   Temp 97.3 °F (36.3 °C) (Temporal)   Wt 275 lb 6.4 oz (124.9 kg)   SpO2 100%   BMI 50.37 kg/m²     The patient is a well-developed, well-nourished female   in no acute distress. The patient is alert and oriented times three. The patient is alert and oriented times three. Mood and affect are normal.  LYMPHATIC: lymph nodes are not enlarged and are within normal limits  SKIN: normal in color and non tender to palpation. There are no bruises or abrasions noted. NEUROLOGICAL: Motor sensory exam is within normal limits. Reflexes are equal bilaterally.  There is normal sensation to pinprick and light touch  MUSCULOSKELETAL:  Examination Right shoulder   Skin Intact   AC joint tenderness -   Biceps tenderness -   Forward flexion/Elevation    Active abduction    Glenohumeral abduction 90   External rotation ROM 45   Internal rotation ROM 30   Apprehension -   Camis Relocation -   Jerk -   Load and Shift -   Obriens -   Speeds -   Impingement sign +   Supraspinatus/Empty Can +   External Rotation Strength -, 5/5   Lift Off/Belly Press -, 5/5   Neurovascular Intact IMAGING: XR of the right shoulder with 4 views obtained at [facility] dated 7/21/2021 was reviewed and read by Dr. Laron John: Sclerotic changes in the greater tuberosity. With type III acromion    IMPRESSION:      ICD-10-CM ICD-9-CM    1. Tear of right rotator cuff, unspecified tear extent, unspecified whether traumatic  M75.101 840.4         PLAN:   1. Patient presents today with right shoulder pain due to a possible RCT and I would like to order a MRI. Return following MRI   Risk factors include: BMI>50  2. No ultrasound exam indicated today  3. No cortisone injection indicated today   4. No Physical/Occupational Therapy indicated today  5. Yes diagnostic test indicated today: R SHOULDER MRI   6. No durable medical equipment indicated today  7. No referral indicated today   8. No medications indicated today:   9. No Narcotic indicated today       RTC Following MRI       Scribed by Rosalia Calabrese [facility]) as dictated by Kira Markham MD    I, Dr. Kira Markham, confirm that all documentation is accurate.     Kira Markham M.D.   [facility]

## 2022-01-07 ENCOUNTER — HOSPITAL ENCOUNTER (OUTPATIENT)
Age: 57
Discharge: HOME | End: 2022-01-07
Attending: ORTHOPAEDIC SURGERY
Payer: COMMERCIAL

## 2022-01-07 DIAGNOSIS — M75.101 TEAR OF RIGHT ROTATOR CUFF, UNSPECIFIED TEAR EXTENT, UNSPECIFIED WHETHER TRAUMATIC: ICD-10-CM

## 2022-01-07 PROCEDURE — 73221 MRI JOINT UPR EXTREM W/O DYE: CPT

## 2022-01-17 ENCOUNTER — OFFICE VISIT (OUTPATIENT)
Dept: ORTHOPEDIC SURGERY | Age: 57
End: 2022-01-17
Payer: COMMERCIAL

## 2022-01-17 VITALS — HEIGHT: 62 IN | BODY MASS INDEX: 43.84 KG/M2 | TEMPERATURE: 97.1 F | WEIGHT: 238.2 LBS

## 2022-01-17 DIAGNOSIS — M75.121 COMPLETE TEAR OF RIGHT ROTATOR CUFF, UNSPECIFIED WHETHER TRAUMATIC: Primary | ICD-10-CM

## 2022-01-17 PROCEDURE — 99214 OFFICE O/P EST MOD 30 MIN: CPT | Performed by: ORTHOPAEDIC SURGERY

## 2022-01-17 NOTE — PROGRESS NOTES
Barrington Hirsch  1965   Chief Complaint   Patient presents with    Shoulder Pain     f/u mri right shoulder        HISTORY OF PRESENT ILLNESS  Barrington Hirsch is a 55 y.o. female who presents today for reevaluation of right shoulder. She had a fall on July 14, 2021 and was seen in the ED. Patient rates pain as 4/10 today. On 12/21/2021 she was trying to open a door and she had pain that shot down the arm. Most pain is at night and interferes with her sleep. Previously had a cortisone injection a couple of months ago. 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 Pt cannot take NSAIDs but has been using extra-strength tylenol. Patient denies any fever, chills, chest pain, shortness of breath or calf pain. The remainder of the review of systems is negative. There are no new illness or injuries to report since last seen in the office. There are no changes to medications, allergies, family or social history. PHYSICAL EXAM:   Visit Vitals  Temp 97.1 °F (36.2 °C) (Temporal)   Ht 5' 2\" (1.575 m)   Wt 238 lb 3.2 oz (108 kg)   BMI 43.57 kg/m²     The patient is a well-developed, well-nourished female   in no acute distress. The patient is alert and oriented times three. The patient is alert and oriented times three. Mood and affect are normal.  LYMPHATIC: lymph nodes are not enlarged and are within normal limits  SKIN: normal in color and non tender to palpation. There are no bruises or abrasions noted. NEUROLOGICAL: Motor sensory exam is within normal limits. Reflexes are equal bilaterally.  There is normal sensation to pinprick and light touch  MUSCULOSKELETAL:  Examination Right shoulder   Skin Intact   AC joint tenderness -   Biceps tenderness -   Forward flexion/Elevation    Active abduction    Glenohumeral abduction 90   External rotation ROM 45   Internal rotation ROM 30   Apprehension -   Camis Relocation -   Jerk -   Load and Shift -   Obriens -   Speeds -   Impingement sign + Supraspinatus/Empty Can +   External Rotation Strength -, 5/5   Lift Off/Belly Press -, 5/5   Neurovascular Intact       IMAGING: MRI of the right shoulder dated 1/7/2022 was reviewed and read by Dr. Mauricio Jean:   IMPRESSION  1. Full-thickness tear of the supraspinatus with small portion of the posterior tendon remaining attached. Some retraction of the tendon by about 1.6 cm.     2. Infraspinatus with tendinosis and partial thickness tear. Some extension of  the tear interstitially along the tendon.     3.  Slight tendinosis of the subscapularis.     4.  Slightly increased fluid in the subdeltoid-subacromial region.     5. Degenerative hypertrophic changes at the [facility] joint. XR of the right shoulder with 4 views obtained at [facility] dated 7/21/2021 was reviewed and read by Dr. Mauricio Jean: Sclerotic changes in the greater tuberosity. With type III acromion    IMPRESSION:      ICD-10-CM ICD-9-CM    1. Complete tear of right rotator cuff, unspecified whether traumatic  M75.121 727.61         PLAN:   1. Patient presents today with right shoulder pain due to a full-thickness RCT. I discussed treatment options today including surgery but she would like to hold off for now. I advised her to continue to modify activities and use pain as a guide. Provided her with Susana's card to call when ready for surgery. Return as needed. Risk factors include: BMI>50, no NSAIDs  2. No ultrasound exam indicated today  3. No cortisone injection indicated today   4. No Physical/Occupational Therapy indicated today  5. No diagnostic test indicated today  6. No durable medical equipment indicated today  7. No referral indicated today   8. No medications indicated today:   9. No Narcotic indicated today       RTC prn      Scribed by Arpit Cortez) as dictated by Molly Marshall MD    I, Dr. Molly Marshall, confirm that all documentation is accurate.     Molly Marshall M.D.   [facility]

## 2022-02-09 DIAGNOSIS — M75.121 COMPLETE TEAR OF RIGHT ROTATOR CUFF, UNSPECIFIED WHETHER TRAUMATIC: Primary | ICD-10-CM

## 2022-02-24 ENCOUNTER — DOCUMENTATION ONLY (OUTPATIENT)
Dept: ORTHOPEDIC SURGERY | Age: 57
End: 2022-02-24

## 2022-02-24 NOTE — PROGRESS NOTES
Batsheva Graf faxed over [address] Provider Statement to [facility]. Blank copy scanned in to [facility].

## 2022-03-16 DIAGNOSIS — M75.121 COMPLETE TEAR OF RIGHT ROTATOR CUFF, UNSPECIFIED WHETHER TRAUMATIC: Primary | ICD-10-CM

## 2022-03-16 DIAGNOSIS — Z01.818 PREOP EXAMINATION: Primary | ICD-10-CM

## 2022-03-18 PROBLEM — E66.01 OBESITY, MORBID (HCC): Status: ACTIVE | Noted: 2020-11-19

## 2022-03-19 ENCOUNTER — HOSPITAL ENCOUNTER (OUTPATIENT)
Dept: GENERAL RADIOLOGY | Age: 57
Discharge: HOME | End: 2022-03-19
Payer: COMMERCIAL

## 2022-03-19 ENCOUNTER — HOSPITAL ENCOUNTER (OUTPATIENT)
Dept: LAB | Age: 57
Discharge: HOME | End: 2022-03-19
Payer: COMMERCIAL

## 2022-03-19 ENCOUNTER — HOSPITAL ENCOUNTER (OUTPATIENT)
Dept: LAB | Age: 57
Discharge: HOME | End: 2022-03-19

## 2022-03-19 DIAGNOSIS — Z01.818 PREOP EXAMINATION: ICD-10-CM

## 2022-03-19 PROBLEM — D25.9 UTERINE FIBROID: Status: ACTIVE | Noted: 2021-11-23

## 2022-03-19 LAB — XX-LABCORP SPECIMEN COL,LCBCF: NORMAL

## 2022-03-19 PROCEDURE — 93005 ELECTROCARDIOGRAM TRACING: CPT

## 2022-03-19 PROCEDURE — 99001 SPECIMEN HANDLING PT-LAB: CPT

## 2022-03-19 PROCEDURE — 71046 X-RAY EXAM CHEST 2 VIEWS: CPT

## 2022-03-20 LAB
ATRIAL RATE: 66 BPM
CALCULATED P AXIS, ECG09: 54 DEGREES
CALCULATED R AXIS, ECG10: 8 DEGREES
CALCULATED T AXIS, ECG11: 31 DEGREES
DIAGNOSIS, 93000: NORMAL
P-R INTERVAL, ECG05: 166 MS
Q-T INTERVAL, ECG07: 422 MS
QRS DURATION, ECG06: 88 MS
QTC CALCULATION (BEZET), ECG08: 442 MS
VENTRICULAR RATE, ECG03: 66 BPM

## 2022-03-23 ENCOUNTER — DOCUMENTATION ONLY (OUTPATIENT)
Dept: ORTHOPEDIC SURGERY | Age: 57
End: 2022-03-23

## 2022-03-25 DIAGNOSIS — Z01.818 PREOP EXAMINATION: ICD-10-CM

## 2022-04-05 ENCOUNTER — OFFICE VISIT (OUTPATIENT)
Dept: ORTHOPEDIC SURGERY | Age: 57
End: 2022-04-05

## 2022-04-05 VITALS
WEIGHT: 234.4 LBS | HEIGHT: 62 IN | DIASTOLIC BLOOD PRESSURE: 84 MMHG | TEMPERATURE: 97.5 F | HEART RATE: 76 BPM | BODY MASS INDEX: 43.13 KG/M2 | OXYGEN SATURATION: 98 % | SYSTOLIC BLOOD PRESSURE: 116 MMHG

## 2022-04-05 DIAGNOSIS — M75.121 COMPLETE TEAR OF RIGHT ROTATOR CUFF, UNSPECIFIED WHETHER TRAUMATIC: Primary | ICD-10-CM

## 2022-04-05 RX ORDER — OXYCODONE HYDROCHLORIDE 5 MG/1
5 TABLET ORAL
Qty: 40 TABLET | Refills: 0 | Status: SHIPPED | OUTPATIENT
Start: 2022-04-05 | End: 2022-04-12

## 2022-04-05 RX ORDER — GABAPENTIN 100 MG/1
400 CAPSULE ORAL ONCE
Status: CANCELLED | OUTPATIENT
Start: 2022-04-05 | End: 2022-04-05

## 2022-04-05 RX ORDER — CELECOXIB 100 MG/1
400 CAPSULE ORAL ONCE
Status: CANCELLED | OUTPATIENT
Start: 2022-04-05 | End: 2022-04-05

## 2022-04-05 RX ORDER — ESTRADIOL 0.1 MG/D
PATCH TRANSDERMAL
COMMUNITY
Start: 2022-03-02

## 2022-04-05 RX ORDER — GABAPENTIN 300 MG/1
300 CAPSULE ORAL
Qty: 5 CAPSULE | Refills: 0 | Status: SHIPPED | OUTPATIENT
Start: 2022-04-05 | End: 2022-04-10

## 2022-04-05 RX ORDER — ACETAMINOPHEN 325 MG/1
1000 TABLET ORAL ONCE
Status: CANCELLED | OUTPATIENT
Start: 2022-04-05 | End: 2022-04-05

## 2022-04-05 NOTE — PATIENT INSTRUCTIONS
Dr. Catalina Morris Shoulder Arthroscopy Information    What is the surgery? - This is an outpatient procedure at either [place] [address] or [address] [place], [place] [address]sanjuana Benson will be completely asleep for the procedure. Dr. Catalina Morris will make somewhere between 2-5 small incisions in your shoulder depending on the amount of work to be completed. He will take a tour of your shoulder with the camera and fix everything that needs to be fixed during your surgery. - Total surgery takes about 45 mins to an hour and half depending on how much needed to be repaired    What can you expect after surgery? - You will have a bulky dressing on your shoulder that you can remove 2 days after surgery. You will be able to shower 2 days after surgery but no soaking in a bath, hot tub, ocean or pool x 2 weeks to allow for full wound healing  - You will be in a sling for 5-6 weeks depending on your repair. You will wear this sling whenever you are active, up moving around, and sleeping at night. This sling is to keep you from moving your arm on your own. You are essentially one armed until you are out of your sling. This means no reaching, pulling, grabbing or lifting with the operative arm.   - Dr. Catalina Morris will start physical therapy for you the first business day after surgery. While you cannot move your arm we allow physical therapy to gently move your shoulder. We call this passive range of motion. The goal of this is to decrease your stiffness and in turn decrease your post operative pain. - Plan on being in physical therapy for 10-12 weeks    When can I return to work? - Most patients return to desk work only after 2 weeks. You are able to type but there is no overhead work or lifting  - You will start some gentle lifting up to 5-10lbs at about 8-10 weeks post operatively.  You will gradually increase how much you are able to lift after this point under the guidance of Dr. Catalina Morris, his physician assistant and physical therapy. - At 6 months you are able to do all activities as tolerated but it may take you a full 9-12 months to fully recover from your surgery    Not all shoulder arthroscopies are the same. The specifics of your individual case will be discussed at length with you by Dr. Alicia Montes and his Physician Assistant. Enedina Main  Surgical Coordinator  [address] [place]  [address] [place] [address] [email or web address]  P: [phone]  F: [phone]

## 2022-04-05 NOTE — DISCHARGE INSTRUCTIONS
Dr. Katheryn Mejia Shoulder Arthroscopy  Postoperative Information    How to manage your pain after your Surgery:  After your surgery it is expected that you will have some pain. In efforts to reduce the amounts of side effects from pain medications we would like you to follow the below medication regimen after surgery:  1. Take 1000mg of Tylenol by mouth every 8 hours x 5 days. This is 4 tabs of regular strength Tylenol or 2 tabs of Extra Strength Tylenol  2. 300mg of Gabapentin every night x 5 days starting the evening you get home from the hospital  DO NOT TAKE THIS IF YOU ALREADY TAKE LYRICA OR GABAPENTIN (TAKE YOUR NORMAL DOSE) OR HAVE ALLERGY TO GABAPENTIN  3. We would like you to take a NSAID medication for the first 3 days following surgery. In efforts to avoid additional prescription costs we recommend one of the following over the counter medications. 600mg of Ibuprofen every 8 hours x 3 days    OR   500mg of Naproxen (Aleve) every 12 hours x 3 days  If you have a prescription for Meloxicam or Celebrex already you may resume this as previously prescribed instead of the Over the Counter Medications. DO NOT TAKE ANY OF THESE IF YOU HAVE CHRONIC RENAL FAILURE, ARE TAKING A BLOOD THINNER, HAVE A HISTORY OF A GASTRIC BLEED OR ARE ALLERGIC TO ASPIRIN. IT IS OK TO TAKE IF YOU HAVE HISTORY OF GASTRIC BYPASS SURGERY. 4. Then ONLY IF YOUR PAIN IS UNCONTROLLED you may take your Narcotic Pain medication as prescribed. THIS IS THE PRESCRIPTION THAT WAS GIVEN TO YOU IN THE OFFICE. You should place an ice bag over your shoulder to help with pain and reduce swelling. A soft bandage was placed on your shoulder. You may take the bandage off two days after surgery You may have a clear bandage on your incisions that looks like tape. This is surgical superglue. Leave these on unless you are noticing redness or itching. Band-Aids should be used for the first 4-5 days over each incision.     There are 2-6 small incisions in your shoulder and they may be sore and develop bruising over the next several days. The bruising should resolve and no special care will be needed. It is safe to take a shower or bathe two days after surgery. You will be given a sling to use. Continue to wear this at all times, unless you are given different instructions. You will be shown specific instructions when you see your physical therapist. Susana Juárez should start PT the first business day following surgery. Even though your incisions are small, there has been an operation inside and around the shoulder joint. Complete healing may take weeks or several months. If you have a high temperature, unexpected pain, redness or swelling in your shoulder please contact my office immediately.     Dr. Nelly Tomlinson office number [phone]

## 2022-04-05 NOTE — H&P
HISTORY AND PHYSICAL          Patient: Arnaud Chanel                MRN: [id number]       SSN: [id number]  YOB: 1965          AGE: 55 y.o. SEX: female      Patient scheduled for:  Right shoulder arthroscopic rotator cuff repair    Surgeon: Aissatou Montes MD    ANESTHESIA TYPE:  General    HISTORY:     The patient was seen in the office today for a preoperative history and physical for an upcoming above listed surgery. The patient is a pleasant 55 y.o. female who has a history of right shoulder pain. She had a fall on July 14, 2021 and was seen in the ED. Patient rates pain as 4/10 today. On 12/21/2021 she was trying to open a door and she had pain that shot down the arm. Most pain is at night and interferes with her sleep. Previously had a cortisone injection a couple of months ago. 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 Pt cannot take NSAIDs but has been using extra-strength tylenol. Due to the current findings, affected activity of daily living and continued pain and discomfort, surgical intervention is indicated. The alternatives, risks, and complications, including but not limited to infection, blood loss, need for blood transfusion, neurovascular damage, daphnie-incisional numbness, subcutaneous hematoma, bone fracture, anesthetic complications, DVT, PE, death, RSD, postoperative stiffness and pain, possible surgical scar, delayed healing and nonhealing, reflexive sympathetic dystrophy, damage to blood vessels and nerves, need for more surgery, MI, and stroke,  failure of hardware, gait disturbances,have been discussed. The patient understands and wishes to proceed with surgery.      PAST MEDICAL HISTORY:     Past Medical History:   Diagnosis Date    Arthritis     osteoarthritis    Fibroid, uterine     GERD (gastroesophageal reflux disease)     Gout     Headache     Hypertension     Post-menopausal bleeding     Sleep apnea     Uses CPAP       CURRENT MEDICATIONS: Current Outpatient Medications   Medication Sig Dispense Refill    azelastine (ASTELIN) 137 mcg (0.1 %) nasal spray 1 Spray two (2) times a day. Use in each nostril as directed      FLUoxetine (PROzac) 10 mg capsule Take  by mouth daily.  doxepin (SINEquan) 10 mg capsule Take 10 mg by mouth nightly.  buPROPion SR (Wellbutrin SR) 150 mg SR tablet Take  by mouth two (2) times a day.  calcium-cholecalciferol, d3, (CALCIUM 600 + D) 600-125 mg-unit tab Take  by mouth.  famotidine (Pepcid) 20 mg tablet Take 20 mg by mouth daily.  fluticasone propionate (FLONASE) 50 mcg/actuation nasal spray 2 Sprays daily.  zolpidem (AMBIEN) 10 mg tablet Take 10 mg by mouth.  cetirizine (ZyrTEC) 10 mg tablet Take 10 mg by mouth daily.  polyethylene glycol (Miralax) 17 gram/dose powder Take 17 g by mouth daily.  ergocalciferol (ERGOCALCIFEROL) 1,250 mcg (50,000 unit) capsule TAKE ONE CAPSULE BY MOUTH WEEKLY FOR 7 DAYS      lisinopril-hydroCHLOROthiazide (PRINZIDE, ZESTORETIC) 20-25 mg per tablet TAKE 1 TABLET BY MOUTH EVERY DAY      therapeutic multivitamin (THERAGRAN) tablet Take 2 Tabs by mouth daily.  acetaminophen (Tylenol Extra Strength) 500 mg tablet Take  by mouth every six (6) hours as needed for Pain.  montelukast (SINGULAIR) 10 mg tablet Take 10 mg by mouth daily.  cpap machine kit by Does Not Apply route.          ALLERGIES:     Allergies   Allergen Reactions    Augmentin [Amoxicillin-Pot Clavulanate] Diarrhea    Bactrim [Sulfamethoprim Ss] Itching         SURGICAL HISTORY:     Past Surgical History:   Procedure Laterality Date    COLONOSCOPY N/A 4/5/2021    COLONOSCOPY performed by Lakesha Obrien MD at [address] HX BREAST REDUCTION  2010    HX GASTRIC BYPASS  10/2017    HX LAP GASTRIC BYPASS  2017    HX TOTAL LAPAROSCOPIC HYSTERECTOMY  11/23/2021    HX TUBAL LIGATION      I&D ABCESS SIMP Left 11/24/2020    I and D of left back abscess       SOCIAL HISTORY:     Social History     Socioeconomic History    Marital status: SINGLE   Tobacco Use    Smoking status: Never Smoker    Smokeless tobacco: Never Used   Vaping Use    Vaping Use: Never used   Substance and Sexual Activity    Alcohol use: Yes     Comment: occasionally/socially    Drug use: Never    Sexual activity: Yes     Partners: Male       FAMILY HISTORY:     Family History   Problem Relation Age of Onset    Diabetes Mother     Hypertension Mother     Elevated Lipids Mother     Diabetes Father     Hypertension Father     Elevated Lipids Father        REVIEW OF SYSTEMS:     Negative for fevers, chills, chest pain, shortness of breath, weight loss, recent illness     General: Negative for fever and chills. No unexpected change in weight. Denies fatigue. No change in appetite. Skin: Negative for rash or itching. HEENT: Negative for congestion, sore throat, neck pain and neck stiffness. No change in vision or hearing. Hasn't noted any enlarged lymph nodes in the neck. Cardiovascular:  Negative for chest pain and palpitations. Has not noted pedal edema. Respiratory: Negative for cough, colds, sinus, hemoptysis, shortness of breath and wheezing. Gastrointestinal: Negative for nausea and vomiting, rectal bleeding, coffee ground emesis, abdominal pain, diarrhea and constipation. Genitourinary: Negative for dysuria, frequency urgency, or burning on micturition. No flank pain, no foul smelling urine, no difficulty with initiating urination. Hematological: Negative for bleeding or easy bruising. Musculoskeletal: Negative  for arthralgias, back pain or neck pain. Neurological: Negative for dizziness, seizures or syncopal episodes. Denies headaches. Endocrine: Denies excessive thirst.  No heat/cold intolerance. Psychiatric: Negative for depression or insomnia. PHYSICAL EXAMINATION:     VITALS: There were no vitals taken for this visit. GEN:  Well developed, well nourished 55 y. o. female in no acute distress. HEENT: Normocephalic and atraumatic. Eyes: Conjunctivae and EOM are normal.Pupils are equal, round, and reactive to light. External ear normal appearance, external nose normal appearing. Mouth/Throat: Oropharynx is clear and moist, able to handle oral secretions w/out difficulty, airway patent  NECK: Supple. Normal ROM, No lymphadenopathy. Trachea is midline. No bruising, swelling or deformity  RESP: Clear to auscultation bilaterally. No wheezes, rales, rhonchi. Normal effort and breath sounds. No respiratory distress  CARDIO:  Normal rate, regular rhythm and normal heart sounds. No MGR. ABDOMEN: Soft, non-tender, non-distended, normoactive bowel sounds in all four quadrants. There is no tenderness. There is no rebound and no guarding. BACK: No CVA or spinal tenderness  BREAST:  Deferred  PELVIC:    Deferred   RECTAL:  Deferred   :           Deferred  EXTREMITIES: EXAMINATION OF: right shoulder  Examination Right shoulder   Skin Intact   AC joint tenderness -   Biceps tenderness -   Forward flexion/Elevation    Active abduction    Glenohumeral abduction 90   External rotation ROM 45   Internal rotation ROM 30   Apprehension -   Camis Relocation -   Jerk -   Load and Shift -   Obriens -   Speeds -   Impingement sign +   Supraspinatus/Empty Can +   External Rotation Strength -, 5/5   Lift Off/Belly Press -, 5/5   Neurovascular Intact        NEUROVASCULAR: Sensation intact to light touch and strength grossly intact and symmetrical. No nystagmus. Positive distal pulses and capillary refill. DVT ASSESSMENT:  There is not  calf tenderness. No evidence of DVT seen on physical exam.  MOTOR: In tact  PSYCH: Alert an oriented to person, place and time.  Mood, memory, affect, behavior and judgment normal       RADIOGRAPHS & DIAGNOSTIC STUDIES:     MRI/xray reveals :   IMAGING: MRI of the right shoulder dated 1/7/2022 was reviewed and read by Dr. Luís Bonner:   IMPRESSION  1.  Full-thickness tear of the supraspinatus with small portion of the posterior tendon remaining attached.  Some retraction of the tendon by about 1.6 cm.     2.  Infraspinatus with tendinosis and partial thickness tear.  Some extension of  the tear interstitially along the tendon.     3.  Slight tendinosis of the subscapularis.     4.  Slightly increased fluid in the subdeltoid-subacromial region.     5.  Degenerative hypertrophic changes at the [facility] joint.        XR of the right shoulder with 4 views obtained at [facility] dated 7/21/2021 was reviewed and read by Dr. Dottie Ambrosio: Sclerotic changes in the greater tuberosity. With type III acromion           LABS:     Labs and ekg scanned    ASSESSMENT:       Encounter Diagnosis   Name Primary?  Complete tear of right rotator cuff, unspecified whether traumatic Yes       PLAN:     Again, the alternatives, risks, and complications, as well as expected outcome were discussed. The patient understands and agrees to proceed with right shoulder arthroscopic rotator cuff repair. The patient was counseled at length about the risks of jeanette Covid-19 during their perioperative period and any recovery window from their procedure. The patient was made aware that jeanette Covid-19  may worsen their prognosis for recovering from their procedure and lend to a higher morbidity and/or mortality risk. All material risks, benefits, and reasonable alternatives including postponing the procedure were discussed. The patient does  wish to proceed with the procedure at this time. Patient given orders listed below:    No orders of the defined types were placed in this encounter.         Tae Conroy PA-C  4/5/2022  9:59 AM

## 2022-04-05 NOTE — PERIOP NOTES
PRE-SURGICAL INSTRUCTIONS        Patient's Name:  Leanne VERONICA Date:  4/5/2022            Covid Testing Date and Time:    Surgery Date:  4/8/2022                1. Do NOT eat or drink anything, including candy, gum, or ice chips after midnight on 4/8/22, unless you have specific instructions from your surgeon or anesthesia provider to do so.  2. You may brush your teeth before coming to the hospital.  3. No smoking 24 hours prior to the day of surgery. 4. No alcohol 24 hours prior to the day of surgery. 5. No recreational drugs for one week prior to the day of surgery. 6. Leave all valuables, including money/purse, at home. 7. Remove all jewelry, nail polish, acrylic nails, and makeup (including mascara); no lotions powders, deodorant, or perfume/cologne/after shave on the skin. 8. Follow instruction for Hibiclens washes and CHG wipes from surgeon's office. 9. Glasses/contact lenses and dentures may be worn to the hospital.  They will be removed prior to surgery. 10. Call your doctor if symptoms of a cold or illness develop within 24-48 hours prior to your surgery. 11.  If you are having an outpatient procedure, please make arrangements for a responsible ADULT TO [address] [place] [address] and stay with you for 24 hours after your surgery. 12. ONE VISITOR in the hospital at this time for outpatient procedures. Exceptions may be made for surgical admissions, per nursing unit guidelines      Special Instructions:      Bring list of CURRENT medications. Bring inhaler. Bring CPAP machine. Bring any pertinent legal medical records. Follow physician instructions about stopping anticoagulants. On the day of surgery, come in the main entrance of [facility]. Let the  at the desk know you are there for surgery. A staff member will come escort you to the surgical area on the second floor.     If you have any questions or concerns, please do not hesitate to call:     (Prior to the day of surgery) [facility] department:  [phone]   (Day of surgery) Pre-Op department:  [phone]    These surgical instructions were reviewed with Devendra Zamudio during the [facility] phone call.

## 2022-04-07 ENCOUNTER — ANESTHESIA EVENT (OUTPATIENT)
Dept: SURGERY | Age: 57
End: 2022-04-07
Payer: COMMERCIAL

## 2022-04-08 ENCOUNTER — ANESTHESIA (OUTPATIENT)
Dept: SURGERY | Age: 57
End: 2022-04-08
Payer: COMMERCIAL

## 2022-04-08 ENCOUNTER — HOSPITAL ENCOUNTER (OUTPATIENT)
Age: 57
Setting detail: OUTPATIENT SURGERY
Discharge: HOME | End: 2022-04-08
Attending: ORTHOPAEDIC SURGERY | Admitting: ORTHOPAEDIC SURGERY
Payer: COMMERCIAL

## 2022-04-08 VITALS
OXYGEN SATURATION: 94 % | WEIGHT: 233 LBS | BODY MASS INDEX: 42.88 KG/M2 | DIASTOLIC BLOOD PRESSURE: 86 MMHG | TEMPERATURE: 97.9 F | RESPIRATION RATE: 20 BRPM | HEIGHT: 62 IN | HEART RATE: 71 BPM | SYSTOLIC BLOOD PRESSURE: 136 MMHG

## 2022-04-08 DIAGNOSIS — M75.121 NONTRAUMATIC COMPLETE TEAR OF RIGHT ROTATOR CUFF: ICD-10-CM

## 2022-04-08 PROCEDURE — 76210000006 HC OR PH I REC 0.5 TO 1 HR: Performed by: ORTHOPAEDIC SURGERY

## 2022-04-08 PROCEDURE — 77030002933 HC SUT MCRYL J&J -A: Performed by: ORTHOPAEDIC SURGERY

## 2022-04-08 PROCEDURE — 77030010427: Performed by: ORTHOPAEDIC SURGERY

## 2022-04-08 PROCEDURE — 77030040922 HC BLNKT HYPOTHRM STRY -A: Performed by: ORTHOPAEDIC SURGERY

## 2022-04-08 PROCEDURE — 77030006891 HC BLD SHV RESECT STRY -B: Performed by: ORTHOPAEDIC SURGERY

## 2022-04-08 PROCEDURE — C1713 ANCHOR/SCREW BN/BN,TIS/BN: HCPCS | Performed by: ORTHOPAEDIC SURGERY

## 2022-04-08 PROCEDURE — 01630 ANES OPN/ARTHR PX SHO JT NOS: CPT | Performed by: STUDENT IN AN ORGANIZED HEALTH CARE EDUCATION/TRAINING PROGRAM

## 2022-04-08 PROCEDURE — 77030010430: Performed by: ORTHOPAEDIC SURGERY

## 2022-04-08 PROCEDURE — 77030017964 HC PRB COAG4 STRY -C: Performed by: ORTHOPAEDIC SURGERY

## 2022-04-08 PROCEDURE — 77030003598 HC NDL MULT/FIRE ARTH -C: Performed by: ORTHOPAEDIC SURGERY

## 2022-04-08 PROCEDURE — 77030026438 HC STYL ET INTUB CARD -A: Performed by: STUDENT IN AN ORGANIZED HEALTH CARE EDUCATION/TRAINING PROGRAM

## 2022-04-08 PROCEDURE — 74011250636 HC RX REV CODE- 250/636: Performed by: NURSE ANESTHETIST, CERTIFIED REGISTERED

## 2022-04-08 PROCEDURE — 77030020274 HC MISC IMPL ORTHOPEDIC: Performed by: ORTHOPAEDIC SURGERY

## 2022-04-08 PROCEDURE — 01630 ANES OPN/ARTHR PX SHO JT NOS: CPT | Performed by: NURSE ANESTHETIST, CERTIFIED REGISTERED

## 2022-04-08 PROCEDURE — 74011250636 HC RX REV CODE- 250/636: Performed by: ORTHOPAEDIC SURGERY

## 2022-04-08 PROCEDURE — 76210000020 HC REC RM PH II FIRST 0.5 HR: Performed by: ORTHOPAEDIC SURGERY

## 2022-04-08 PROCEDURE — 77030002919 HC SUT FBRTAPE ARTH -B: Performed by: ORTHOPAEDIC SURGERY

## 2022-04-08 PROCEDURE — 74011000250 HC RX REV CODE- 250: Performed by: NURSE ANESTHETIST, CERTIFIED REGISTERED

## 2022-04-08 PROCEDURE — 76942 ECHO GUIDE FOR BIOPSY: CPT | Performed by: STUDENT IN AN ORGANIZED HEALTH CARE EDUCATION/TRAINING PROGRAM

## 2022-04-08 PROCEDURE — 77030004453 HC BUR SHV STRY -B: Performed by: ORTHOPAEDIC SURGERY

## 2022-04-08 PROCEDURE — 64415 NJX AA&/STRD BRCH PLXS IMG: CPT | Performed by: STUDENT IN AN ORGANIZED HEALTH CARE EDUCATION/TRAINING PROGRAM

## 2022-04-08 PROCEDURE — 77030003666 HC NDL SPINAL BD -A: Performed by: ORTHOPAEDIC SURGERY

## 2022-04-08 PROCEDURE — 77030033005 HC TBNG ARTHSC PMP STRY -B: Performed by: ORTHOPAEDIC SURGERY

## 2022-04-08 PROCEDURE — 2709999900 HC NON-CHARGEABLE SUPPLY: Performed by: ORTHOPAEDIC SURGERY

## 2022-04-08 PROCEDURE — 29827 SHO ARTHRS SRG RT8TR CUF RPR: CPT | Performed by: ORTHOPAEDIC SURGERY

## 2022-04-08 PROCEDURE — 76010000153 HC OR TIME 1.5 TO 2 HR: Performed by: ORTHOPAEDIC SURGERY

## 2022-04-08 PROCEDURE — 29826 SHO ARTHRS SRG DECOMPRESSION: CPT | Performed by: PHYSICIAN ASSISTANT

## 2022-04-08 PROCEDURE — C1776 JOINT DEVICE (IMPLANTABLE): HCPCS | Performed by: ORTHOPAEDIC SURGERY

## 2022-04-08 PROCEDURE — 74011000258 HC RX REV CODE- 258: Performed by: ORTHOPAEDIC SURGERY

## 2022-04-08 PROCEDURE — 64450 NJX AA&/STRD OTHER PN/BRANCH: CPT | Performed by: STUDENT IN AN ORGANIZED HEALTH CARE EDUCATION/TRAINING PROGRAM

## 2022-04-08 PROCEDURE — 77030039266 HC ADH SKN EXOFIN S2SG -A: Performed by: ORTHOPAEDIC SURGERY

## 2022-04-08 PROCEDURE — 74011250636 HC RX REV CODE- 250/636: Performed by: STUDENT IN AN ORGANIZED HEALTH CARE EDUCATION/TRAINING PROGRAM

## 2022-04-08 PROCEDURE — 77030008683 HC TU ET CUF COVD -A: Performed by: STUDENT IN AN ORGANIZED HEALTH CARE EDUCATION/TRAINING PROGRAM

## 2022-04-08 PROCEDURE — 77030019605: Performed by: ORTHOPAEDIC SURGERY

## 2022-04-08 PROCEDURE — 77030040361 HC SLV COMPR DVT MDII -B: Performed by: ORTHOPAEDIC SURGERY

## 2022-04-08 PROCEDURE — 74011250637 HC RX REV CODE- 250/637: Performed by: PHYSICIAN ASSISTANT

## 2022-04-08 PROCEDURE — 76060000034 HC ANESTHESIA 1.5 TO 2 HR: Performed by: ORTHOPAEDIC SURGERY

## 2022-04-08 PROCEDURE — 74011250637 HC RX REV CODE- 250/637: Performed by: NURSE ANESTHETIST, CERTIFIED REGISTERED

## 2022-04-08 PROCEDURE — 77030031139 HC SUT VCRL2 J&J -A: Performed by: ORTHOPAEDIC SURGERY

## 2022-04-08 PROCEDURE — 77030002966 HC SUT PDS J&J -A: Performed by: ORTHOPAEDIC SURGERY

## 2022-04-08 PROCEDURE — 77030002922 HC SUT FBRWRE ARTH -B: Performed by: ORTHOPAEDIC SURGERY

## 2022-04-08 PROCEDURE — 77030013079 HC BLNKT BAIR HGGR 3M -A: Performed by: STUDENT IN AN ORGANIZED HEALTH CARE EDUCATION/TRAINING PROGRAM

## 2022-04-08 PROCEDURE — 29826 SHO ARTHRS SRG DECOMPRESSION: CPT | Performed by: ORTHOPAEDIC SURGERY

## 2022-04-08 PROCEDURE — 29827 SHO ARTHRS SRG RT8TR CUF RPR: CPT | Performed by: PHYSICIAN ASSISTANT

## 2022-04-08 DEVICE — ANCHOR SUTURE BIOCOMP 4.75X19.1 MM SWIVELOCK C: Type: IMPLANTABLE DEVICE | Site: SHOULDER | Status: FUNCTIONAL

## 2022-04-08 DEVICE — SYSTEM ANCHOR 4.75 BC LOOP N TRACK: Type: IMPLANTABLE DEVICE | Site: SHOULDER | Status: FUNCTIONAL

## 2022-04-08 DEVICE — ANCHOR SUT L14.7MM DIA5.5MM BIOCOMPOSITE FULL THRD W/ 1.3MM: Type: IMPLANTABLE DEVICE | Site: SHOULDER | Status: FUNCTIONAL

## 2022-04-08 RX ORDER — LIDOCAINE HYDROCHLORIDE 20 MG/ML
INJECTION, SOLUTION EPIDURAL; INFILTRATION; INTRACAUDAL; PERINEURAL AS NEEDED
Status: DISCONTINUED | OUTPATIENT
Start: 2022-04-08 | End: 2022-04-08 | Stop reason: HOSPADM

## 2022-04-08 RX ORDER — ROPIVACAINE HYDROCHLORIDE 5 MG/ML
INJECTION, SOLUTION EPIDURAL; INFILTRATION; PERINEURAL
Status: COMPLETED | OUTPATIENT
Start: 2022-04-08 | End: 2022-04-08

## 2022-04-08 RX ORDER — HYDROMORPHONE HYDROCHLORIDE 2 MG/ML
0.5 INJECTION, SOLUTION INTRAMUSCULAR; INTRAVENOUS; SUBCUTANEOUS
Status: DISCONTINUED | OUTPATIENT
Start: 2022-04-08 | End: 2022-04-08 | Stop reason: HOSPADM

## 2022-04-08 RX ORDER — INSULIN LISPRO 100 [IU]/ML
INJECTION, SOLUTION INTRAVENOUS; SUBCUTANEOUS ONCE
Status: DISCONTINUED | OUTPATIENT
Start: 2022-04-08 | End: 2022-04-08 | Stop reason: HOSPADM

## 2022-04-08 RX ORDER — ONDANSETRON 2 MG/ML
INJECTION INTRAMUSCULAR; INTRAVENOUS AS NEEDED
Status: DISCONTINUED | OUTPATIENT
Start: 2022-04-08 | End: 2022-04-08 | Stop reason: HOSPADM

## 2022-04-08 RX ORDER — ONDANSETRON 2 MG/ML
4 INJECTION INTRAMUSCULAR; INTRAVENOUS ONCE
Status: COMPLETED | OUTPATIENT
Start: 2022-04-08 | End: 2022-04-08

## 2022-04-08 RX ORDER — FENTANYL CITRATE 50 UG/ML
50 INJECTION, SOLUTION INTRAMUSCULAR; INTRAVENOUS AS NEEDED
Status: DISCONTINUED | OUTPATIENT
Start: 2022-04-08 | End: 2022-04-08 | Stop reason: HOSPADM

## 2022-04-08 RX ORDER — CELECOXIB 400 MG/1
400 CAPSULE ORAL ONCE
Status: COMPLETED | OUTPATIENT
Start: 2022-04-08 | End: 2022-04-08

## 2022-04-08 RX ORDER — LIDOCAINE HYDROCHLORIDE 10 MG/ML
0.1 INJECTION, SOLUTION EPIDURAL; INFILTRATION; INTRACAUDAL; PERINEURAL AS NEEDED
Status: DISCONTINUED | OUTPATIENT
Start: 2022-04-08 | End: 2022-04-08 | Stop reason: HOSPADM

## 2022-04-08 RX ORDER — GABAPENTIN 400 MG/1
400 CAPSULE ORAL ONCE
Status: COMPLETED | OUTPATIENT
Start: 2022-04-08 | End: 2022-04-08

## 2022-04-08 RX ORDER — DEXTROSE MONOHYDRATE 100 MG/ML
0-250 INJECTION, SOLUTION INTRAVENOUS AS NEEDED
Status: DISCONTINUED | OUTPATIENT
Start: 2022-04-08 | End: 2022-04-08 | Stop reason: HOSPADM

## 2022-04-08 RX ORDER — CEFAZOLIN SODIUM 1 G/3ML
INJECTION, POWDER, FOR SOLUTION INTRAMUSCULAR; INTRAVENOUS AS NEEDED
Status: DISCONTINUED | OUTPATIENT
Start: 2022-04-08 | End: 2022-04-08 | Stop reason: HOSPADM

## 2022-04-08 RX ORDER — ACETAMINOPHEN 500 MG
1000 TABLET ORAL ONCE
Status: COMPLETED | OUTPATIENT
Start: 2022-04-08 | End: 2022-04-08

## 2022-04-08 RX ORDER — FENTANYL CITRATE 50 UG/ML
100 INJECTION, SOLUTION INTRAMUSCULAR; INTRAVENOUS ONCE
Status: COMPLETED | OUTPATIENT
Start: 2022-04-08 | End: 2022-04-08

## 2022-04-08 RX ORDER — ROPIVACAINE HYDROCHLORIDE 5 MG/ML
30 INJECTION, SOLUTION EPIDURAL; INFILTRATION; PERINEURAL
Status: DISCONTINUED | OUTPATIENT
Start: 2022-04-08 | End: 2022-04-08 | Stop reason: HOSPADM

## 2022-04-08 RX ORDER — DEXAMETHASONE SODIUM PHOSPHATE 4 MG/ML
INJECTION, SOLUTION INTRA-ARTICULAR; INTRALESIONAL; INTRAMUSCULAR; INTRAVENOUS; SOFT TISSUE AS NEEDED
Status: DISCONTINUED | OUTPATIENT
Start: 2022-04-08 | End: 2022-04-08 | Stop reason: HOSPADM

## 2022-04-08 RX ORDER — FENTANYL CITRATE 50 UG/ML
INJECTION, SOLUTION INTRAMUSCULAR; INTRAVENOUS AS NEEDED
Status: DISCONTINUED | OUTPATIENT
Start: 2022-04-08 | End: 2022-04-08 | Stop reason: HOSPADM

## 2022-04-08 RX ORDER — SUCCINYLCHOLINE CHLORIDE 20 MG/ML
INJECTION INTRAMUSCULAR; INTRAVENOUS AS NEEDED
Status: DISCONTINUED | OUTPATIENT
Start: 2022-04-08 | End: 2022-04-08 | Stop reason: HOSPADM

## 2022-04-08 RX ORDER — SODIUM CHLORIDE, SODIUM LACTATE, POTASSIUM CHLORIDE, CALCIUM CHLORIDE 600; 310; 30; 20 MG/100ML; MG/100ML; MG/100ML; MG/100ML
125 INJECTION, SOLUTION INTRAVENOUS CONTINUOUS
Status: DISCONTINUED | OUTPATIENT
Start: 2022-04-08 | End: 2022-04-08 | Stop reason: HOSPADM

## 2022-04-08 RX ORDER — MAGNESIUM SULFATE 100 %
4 CRYSTALS MISCELLANEOUS AS NEEDED
Status: DISCONTINUED | OUTPATIENT
Start: 2022-04-08 | End: 2022-04-08 | Stop reason: HOSPADM

## 2022-04-08 RX ORDER — MIDAZOLAM HYDROCHLORIDE 1 MG/ML
2 INJECTION, SOLUTION INTRAMUSCULAR; INTRAVENOUS ONCE
Status: COMPLETED | OUTPATIENT
Start: 2022-04-08 | End: 2022-04-08

## 2022-04-08 RX ORDER — OXYCODONE HYDROCHLORIDE 5 MG/1
5 TABLET ORAL
Status: DISCONTINUED | OUTPATIENT
Start: 2022-04-08 | End: 2022-04-08 | Stop reason: HOSPADM

## 2022-04-08 RX ORDER — PROPOFOL 10 MG/ML
INJECTION, EMULSION INTRAVENOUS AS NEEDED
Status: DISCONTINUED | OUTPATIENT
Start: 2022-04-08 | End: 2022-04-08 | Stop reason: HOSPADM

## 2022-04-08 RX ORDER — SODIUM CHLORIDE, SODIUM LACTATE, POTASSIUM CHLORIDE, CALCIUM CHLORIDE 600; 310; 30; 20 MG/100ML; MG/100ML; MG/100ML; MG/100ML
25 INJECTION, SOLUTION INTRAVENOUS CONTINUOUS
Status: DISCONTINUED | OUTPATIENT
Start: 2022-04-08 | End: 2022-04-08 | Stop reason: HOSPADM

## 2022-04-08 RX ORDER — FAMOTIDINE 20 MG/1
20 TABLET, FILM COATED ORAL ONCE
Status: COMPLETED | OUTPATIENT
Start: 2022-04-08 | End: 2022-04-08

## 2022-04-08 RX ADMIN — SODIUM CHLORIDE, POTASSIUM CHLORIDE, SODIUM LACTATE AND CALCIUM CHLORIDE 25 ML/HR: 600; 310; 30; 20 INJECTION, SOLUTION INTRAVENOUS at 06:33

## 2022-04-08 RX ADMIN — ACETAMINOPHEN 1000 MG: 500 TABLET ORAL at 06:23

## 2022-04-08 RX ADMIN — FENTANYL CITRATE 100 MCG: 50 INJECTION INTRAMUSCULAR; INTRAVENOUS at 07:24

## 2022-04-08 RX ADMIN — PROPOFOL 150 MG: 10 INJECTION, EMULSION INTRAVENOUS at 07:34

## 2022-04-08 RX ADMIN — ONDANSETRON 4 MG: 2 INJECTION INTRAMUSCULAR; INTRAVENOUS at 10:36

## 2022-04-08 RX ADMIN — CEFAZOLIN 2 G: 1 INJECTION, POWDER, FOR SOLUTION INTRAMUSCULAR; INTRAVENOUS at 07:45

## 2022-04-08 RX ADMIN — DEXAMETHASONE SODIUM PHOSPHATE 4 MG: 4 INJECTION, SOLUTION INTRAMUSCULAR; INTRAVENOUS at 07:51

## 2022-04-08 RX ADMIN — ROPIVACAINE HYDROCHLORIDE 30 ML: 5 INJECTION, SOLUTION EPIDURAL; INFILTRATION; PERINEURAL at 07:27

## 2022-04-08 RX ADMIN — ONDANSETRON 4 MG: 2 INJECTION INTRAMUSCULAR; INTRAVENOUS at 07:51

## 2022-04-08 RX ADMIN — GABAPENTIN 400 MG: 400 CAPSULE ORAL at 06:23

## 2022-04-08 RX ADMIN — MIDAZOLAM 2 MG: 1 INJECTION INTRAMUSCULAR; INTRAVENOUS at 07:31

## 2022-04-08 RX ADMIN — LIDOCAINE HYDROCHLORIDE 50 MG: 20 INJECTION, SOLUTION EPIDURAL; INFILTRATION; INTRACAUDAL; PERINEURAL at 07:34

## 2022-04-08 RX ADMIN — FAMOTIDINE 20 MG: 20 TABLET ORAL at 06:23

## 2022-04-08 RX ADMIN — CELECOXIB 400 MG: 400 CAPSULE ORAL at 06:23

## 2022-04-08 RX ADMIN — FENTANYL CITRATE 100 MCG: 50 INJECTION, SOLUTION INTRAMUSCULAR; INTRAVENOUS at 07:34

## 2022-04-08 RX ADMIN — SUCCINYLCHOLINE CHLORIDE 100 MG: 20 INJECTION, SOLUTION INTRAMUSCULAR; INTRAVENOUS at 07:34

## 2022-04-08 NOTE — OP NOTES
Operative Report    Patient: Sarmad Sarkar MRN: [id number]  SSN: [id number]    YOB: 1965  Age: 55 y.o. Sex: female       Date of Surgery: 4/8/2022     Preoperative Diagnosis: Complete tear of right rotator cuff, unspecified whether traumatic [M75.121]     Postoperative Diagnosis: Complete tear of right rotator cuff, unspecified whether traumatic [M75.121]     Surgeon(s) and Role:     Ava Chinchilla MD - Primary  Assistant KYLER Tidwell instrumental managing the arthroscope while soft tissue and bony work is done therefore necessary for the success of the procedure  Anesthesia: General     Procedure: Procedure(s):  RIGHT SHOULDER ARTHROSCOPIC ROTATOR CUFF REPAIR acromioplasty/ARTHREX     Procedure in Detail: Patient was taken to the operating room to some general trach anesthesia with anesthesia staff. Placed in a standard arthroscopy humphrey lateral decubitus position with the right arm prepped with ChloraPrep solution draped free sterile field. A posterior portal was used arthroscopy portal lateral portals were portal portals were made 11 blade followed by blunt trochars. Once the arthroscope was in place the shoulder was inspected the biceps tendon subscapularis was intact the labrum was frayed but intact and the articular surface was grade 3 changes in a small area of the glenohumeral joint. A small side-to-side tear visible from the articular side but upon tension placed on the subacromial space with prominent bursa was debrided using a shaver a large laminated delaminated tear present in the supraspinatus with the top layer with retraction. Very prominent undersurface of the acromion was found with basically impinging on the tear and the bone for which an anterior inferior acromioplasty performed lateral tip to the [facility] joint converting type I acromion.   The bony bed was taken to bleeding bone and to corkscrew sutures were placed anteriorly posteriorly in the greater tuberosity with 2 sutures each that were passed on each limb of the tear anteriorly and posteriorly and then tied. The tails were then divided for tails and one swivel lock and 40 tails on our swivel lock which were impacted laterally anteriorly and posteriorly after tensioning effecting a very stable repair. Subcutaneous tissues closed with 3-0 Vicryl and the skin with 4-0 Monocryl. Sterile dressings were applied patient tolerated procedure well was taken to recovery room without problems minimal      Estimated Blood Loss: Minimal    Tourniquet Time: * No tourniquets in log *      Implants:   Implant Name Type Inv. Item Serial No.  Lot No. LRB No. Used Action   ANCHOR SUT FT CRKSCR 5.5MM -- W/SUTURETAPE BIOCOMPOSITE - [id number]  ANCHOR SUT FT CRKSCR 5.5MM -- W/SUTURETAPE BIOCOMPOSITE  [employer] [id number] Right 1 Implanted   ANCHOR SUT FT CRKSCR 5.5MM -- W/SUTURETAPE BIOCOMPOSITE - [id number]  ANCHOR SUT FT CRKSCR 5.5MM -- W/SUTURETAPE BIOCOMPOSITE  [employer] [id number] Right 1 Implanted   SYSTEM ANCHOR 4.75 BC LOOP Berbroek - [id number]  SYSTEM ANCHOR 4.75 BC Vaishnavi Skeens [employer]_WD [id number] Right 1 Implanted   ANCHOR BIOCOMP 4.75X19.1MM -- [facility] C-VENT - [id number]  ANCHOR BIOCOMP 4.75X19.1MM -- [address] Yifan Sedki [employer]_WD [id number] Right 1 Implanted               Specimens: * No specimens in log *        Drains: None                Complications: None    Counts: Sponge and needle counts were correct times two.     Signed By:  Jackie Molina MD     April 8, 2022

## 2022-04-08 NOTE — ANESTHESIA POSTPROCEDURE EVALUATION
Procedure(s):  RIGHT SHOULDER ARTHROSCOPIC ROTATOR CUFF REPAIR/ARTHREX.    general, regional    Anesthesia Post Evaluation      Multimodal analgesia: multimodal analgesia used between 6 hours prior to anesthesia start to PACU discharge  Patient location during evaluation: PACU  Patient participation: complete - patient participated  Level of consciousness: sleepy but conscious  Pain management: adequate  Airway patency: patent  Anesthetic complications: no  Cardiovascular status: acceptable  Respiratory status: acceptable  Hydration status: acceptable  Post anesthesia nausea and vomiting:  controlled  Final Post Anesthesia Temperature Assessment:  Normothermia (36.0-37.5 degrees C)      INITIAL Post-op Vital signs:   Vitals Value Taken Time   /95 04/08/22 0946   Temp 36.4 °C (97.6 °F) 04/08/22 0928   Pulse 78 04/08/22 0954   Resp 24 04/08/22 0954   SpO2 94 % 04/08/22 0954   Vitals shown include unvalidated device data.

## 2022-04-08 NOTE — ANESTHESIA PREPROCEDURE EVALUATION
Relevant Problems   RESPIRATORY SYSTEM   (+) KHLOE on CPAP      CARDIOVASCULAR   (+) Essential hypertension      GASTROINTESTINAL   (+) Gastroesophageal reflux disease      ENDOCRINE   (+) Obesity, morbid (HCC)       Anesthetic History   No history of anesthetic complications            Review of Systems / Medical History  Patient summary reviewed, nursing notes reviewed and pertinent labs reviewed    Pulmonary        Sleep apnea: CPAP           Neuro/Psych   Within defined limits           Cardiovascular    Hypertension: well controlled                   GI/Hepatic/Renal     GERD: well controlled           Endo/Other        Morbid obesity and arthritis     Other Findings              Physical Exam    Airway  Mallampati: I  TM Distance: 4 - 6 cm  Neck ROM: normal range of motion   Mouth opening: Normal     Cardiovascular    Rhythm: regular  Rate: normal         Dental  No notable dental hx       Pulmonary  Breath sounds clear to auscultation               Abdominal  GI exam deferred       Other Findings            Anesthetic Plan    ASA: 3  Anesthesia type: general and regional - interscalene block          Induction: Intravenous  Anesthetic plan and risks discussed with: Patient

## 2022-04-08 NOTE — BRIEF OP NOTE
Brief Postoperative Note    Patient: Alfonso Martinez  YOB: 1965  MRN: [id number]    Date of Procedure: 4/8/2022     Pre-Op Diagnosis: Complete tear of right rotator cuff, unspecified whether traumatic [M75.121]    Post-Op Diagnosis: Same as preoperative diagnosis. and impingment      Procedure(s):  RIGHT SHOULDER ARTHROSCOPIC ROTATOR CUFF REPAIR and impingement    Surgeon(s):  Henny Parry MD    Surgical Assistant: Physician Assistant: SHO Alcocer  Surg Asst-1: Anthony MORELAND    Anesthesia: General     Estimated Blood Loss (mL): Minimal    Complications: None    Specimens: * No specimens in log *     Implants:   Implant Name Type Inv.  Item Serial No.  Lot No. LRB No. Used Action   ANCHOR SUT FT CRKSCR 5.5MM -- W/SUTURETAPE BIOCOMPOSITE - [id number]  ANCHOR SUT FT CRKSCR 5.5MM -- W/SUTURETAPE BIOCOMPOSITE  [employer] [id number] Right 1 Implanted   ANCHOR SUT FT CRKSCR 5.5MM -- W/SUTURETAPE BIOCOMPOSITE - [id number]  ANCHOR SUT FT CRKSCR 5.5MM -- W/SUTURETAPE BIOCOMPOSITE  [employer] [id number] Right 1 Implanted   SYSTEM ANCHOR 4.75 BC Melodie Laila [id number]  SYSTEM ANCHOR 4.75 BC Michial Borer [employer]WD [id number] Right 1 Implanted   ANCHOR BIOCOMP 4.75X19.1MM -- [facility] C-VENT - [id number]  ANCHOR BIOCOMP 4.75X19.1MM -- Kenda Severin [employer]WD [id number] Right 1 Implanted       Drains: * No LDAs found *    Findings: 4cm rotator cuff tear    Electronically Signed by SHO Hutchinson on 4/8/2022 at 9:27 AM

## 2022-04-08 NOTE — ANESTHESIA PROCEDURE NOTES
Peripheral Block    Start time: 4/8/2022 7:21 AM  End time: 4/8/2022 7:29 AM  Performed by: Vida Ruvalcaba MD  Authorized by: Vida Ruvalcaba MD       Pre-procedure: Indications: at surgeon's request and post-op pain management    Preanesthetic Checklist: patient identified, risks and benefits discussed, site marked, timeout performed, anesthesia consent given and patient being monitored    Timeout Time: 07:21 EDT          Block Type:   Block Type:   Interscalene  Laterality:  Right  Monitoring:  Standard ASA monitoring, continuous pulse ox, frequent vital sign checks, heart rate, oxygen and responsive to questions  Injection Technique:  Single shot  Procedures: ultrasound guided    Patient Position: seated  Prep: chlorhexidine    Location:  Interscalene  Needle Type:  Pencan  Needle Gauge:  19 G  Needle Localization:  Ultrasound guidance  Medication Injected:  Ropivacaine (PF) (NAROPIN)(0.5%) 5 mg/mL injection, 30 mL  Med Admin Time: 4/8/2022 7:27 AM    Assessment:  Number of attempts:  1  Injection Assessment:  Incremental injection every 5 mL, no paresthesia, ultrasound image on chart, local visualized surrounding nerve on ultrasound, negative aspiration for blood and no intravascular symptoms  Patient tolerance:  Patient tolerated the procedure well with no immediate complications

## 2022-04-08 NOTE — PROGRESS NOTES
conducted a pre-surgery visit with Jed Varela, who is a 55 y.o.,female. The  provided the following Interventions:  Initiated a relationship of care and support. Plan:  Chaplains will continue to follow and will provide pastoral care on an as needed/requested basis.  recommends bedside caregivers page  on duty if patient shows signs of acute spiritual or emotional distress.     [address] [place], [place] [address]  [phone]

## 2022-04-08 NOTE — PROGRESS NOTES
Date of Surgery Update:  Stephania Santos was seen and examined. History and physical has been reviewed. The patient has been examined. There have been no significant clinical changes since the completion of the originally dated History and Physical.    Signed By: Tere Church MD     April 8, 2022 7:21 AM       The above patient was independently seen and examined by myself. The case was then discussed and a proper diagnosis/plan was made. I agree with the above assessment.

## 2022-04-11 ENCOUNTER — HOSPITAL ENCOUNTER (OUTPATIENT)
Dept: PHYSICAL THERAPY | Age: 57
Discharge: HOME | End: 2022-04-11
Payer: COMMERCIAL

## 2022-04-11 PROCEDURE — 97161 PT EVAL LOW COMPLEX 20 MIN: CPT

## 2022-04-11 NOTE — PROGRESS NOTES
[facility] PHYSICAL THERAPY  [address] [facility] Wendy Brar, [address] [employer] [address] - Phone: [phone]  Fax: [phone] / [facility]  Patient Name: Stephania Santos : 1965   Medical   Diagnosis: Right shoulder pain [M25.511] Treatment Diagnosis: Right RCR    Onset Date: DOS: 22     Referral Source: Cassidy Jasmine [facility]): 2022   Prior Hospitalization: See medical history Provider #: [id number]   Prior Level of Function: Independent   Comorbidities: OA, HTN, osteoporosis   Medications: Verified on Patient Summary List   The Plan of Care and following information is based on the information from the initial evaluation.   ==========================================================================================  Assessment / key information:  Pt is a 55year old female who presents to PT today s/p right RCR. DOS: 2022 performed by Dr. Chaz Alvares. EDDIE: was walking and tripped over speed bump and fell onto R arm. Aggravating factors currently include all active movements of arm; c/o soreness in neck. Alleviating factors include medication. R hand dominant. **Pt presents to PT not wearing her arm sling. Pt educated on importance of wearing post-op sling and on her post-op precautions. Re-adjusted arm sling for patient to properly (pt did not bring abduction pillow). Pt left wearing post-op sling correctly. PROM:  *low pain tolerance, pt did not allow any passive movements of shoulder                                        AROM/PROM   Left active Right passive   Flexion 170 0*   Extension 50 0*   Scaption/ 0*   ER @ 0 Degrees T5 0*   IR @ 90 Degrees T7 0*       Patient with a Functional Status score of 4 on FOTO (Focused on Therapeutic Outcomes), which corresponds to a functional limitation of 96%.       Pt was provided a HEP today and educated regarding their diagnosis and prognosis. Clinically, patient's signs and symptoms are consistent with s/p right RCR. Skilled physical therapy is indicated to address noted deficits. Rehabilitation will address limitations noted in evaluation, follow MD orders and work toward improving R scapular and shoulder strength, stability, and mobility so that patient may return to desired activities safely and with less discomfort. Thank you for the opportunity to assist in this case.     ==========================================================================================  Eval Complexity: History: HIGH Complexity :3+ comorbidities / personal factors will impact the outcome/ POC Exam:LOW Complexity : 1-2 Standardized tests and measures addressing body structure, function, activity limitation and / or participation in recreation  Presentation: LOW Complexity : Stable, uncomplicated  Clinical Decision Making:HIGH Complexity : FOTO score of 1- 25 Overall Complexity:LOW     Problem List: pain affecting function, decrease ROM, decrease strength, decrease ADL/ functional abilitiies, decrease activity tolerance, decrease flexibility/ joint mobility and decrease transfer abilities   Treatment Plan may include any combination of the following: Therapeutic exercise, Therapeutic activities, Neuromuscular re-education, Physical agent/modality, Manual therapy, Patient education, Self Care training, Functional mobility training and Home safety training  Patient / Family readiness to learn indicated by: asking questions, trying to perform skills and interest  Persons(s) to be included in education: patient (P)  Barriers to Learning/Limitations: None  Patient Goal (s): To reduce pain   Patient self reported health status: good  Rehabilitation Potential: good       Short Term Goals: To be accomplished in  3  weeks:  Goal/Measure of Progress Goal Met? 1. Pt will be compliant with HEP for symptom management at home.    Status at last Eval: NA Current Status: Unable n/a   2. Patient will report reduction of pain level at worst to 5/10 to allow for restful night's sleep. Status at last Eval: NA Current Status: 10/10 n/a   3. Patient will demonstrate improved R shoulder PROM flexion to at least 100* to allow for improved functional abilities. Status at last Eval: NA Current Status: 0* n/a      Long Term Goals: To be accomplished in  6  weeks:  Goal/Measure of Progress Goal Met? 1. Pt will be independent with HEP at D/C for self management. Status at last Eval: NA Current Status: Unable n/a   2. Patient will demonstrate improved R passive shoulder FL to at least 150* to allow for improved ADLs   Status at last Eval: NA Current Status: 0* n/a   3. Patient will demonstrate improved R passive shoulder ABD to at least 140* to allow for improved ADLs. Status at last Eval: NA Current Status: 0* n/a   4. Patient will increase FOTO Functional Status score to 30 to decrease functional limitations. Status at last Eval: NA Current Status: 4 n/a       Frequency / Duration:   Patient to be seen  2  times per week for 6  weeks:  Patient / Caregiver education and instruction: provided education regarding diagnosis and prognosis as well as details regarding treatment plain. self care, activity modification, brace/ splint application and exercises  Therapist Signature: Tiana Diaz PT, DPT Date: 1/32/5943   Certification Period: 4/11/22 - 7/10/22 Time: 8:20 AM   ===========================================================================================  I certify that the above Physical Therapy Services are being furnished while the patient is under my care. I agree with the treatment plan and certify that this therapy is necessary. Physician Signature:        Date:       Time:     Please sign and return to [employer] or you may fax the signed copy to [phone]. Thank you.   Phyllis Ivey,*

## 2022-04-11 NOTE — PROGRESS NOTES
PHYSICAL THERAPY - DAILY TREATMENT NOTE    Patient Name: Sarmad Sarkar        Date: 2022  : 1965   YES Patient  Verified  Visit #:     Insurance: Payor: Gus Mansfield / Plan: [employer] HMO/CHOICE PLUS/POS / Product Type: HMO /      In time: 12:05 Out time: 12:30   Total Treatment Time: 25     Medicare/BCBS [place] Time Tracking (below)   Total Timed Codes (min):  0 1:1 Treatment Time:  0     TREATMENT AREA =  Right shoulder pain [M25.511]    SUBJECTIVE    Pain Level (on 0 to 10 scale):  8  / 10   Medication Changes/New allergies or changes in medical history, any new surgeries or procedures? YES    If yes, update Summary List   Subjective Functional Status/Changes:  []  No changes reported     Pt is a 55year old female who presents to PT today s/p right RCR. DOS: 2022 performed by Dr. Noa Cloud. EDDIE: was walking and tripped over speed bump and fell onto R arm. Aggravating factors currently include all active movements of arm; c/o soreness in neck. Alleviating factors include medication. R hand dominant.      **Pt presents to PT not wearing her arm sling. Pt educated on importance of wearing post-op sling and on her post-op precautions. Re-adjusted arm sling for patient to properly (pt did not bring abduction pillow). Pt left wearing post-op sling correctly. Symptoms  Pain rating (0-10):    Today:8/10   Best:4/10   Worst:10/10       OBJECTIVE  Physical Therapy Evaluation - Shoulder    PROM:  *low pain tolerance, pt did not allow any passive movements of shoulder                                        AROM/PROM    Left active Right passive   Flexion 170 0*   Extension 50 0*   Scaption/ 0*   ER @ 0 Degrees T5 0*   IR @ 90 Degrees T7 0*      Strength:   [x] deferred                                                                              Therapeutic Procedures:  Min Procedure Specifics + Rationale   n/a [x]  Patient Education (performed throughout session) [x] Review HEP    [] Progressed/Changed HEP based on:   [] proper performance and advancement of Therex/TA   [] reduction in pain level    [] increased functional capacity       [] change in directional preference       Post Treatment Pain Level (on 0 to 10) scale:   8  / 10     ASSESSMENT    Assessment/Changes in Function: Clinically, patient's signs and symptoms are consistent with right RCR DOS 4/8/2022. Skilled physical therapy is indicated to address noted deficits. Rehabilitation will address limitations noted in evaluation, follow MD orders and post op protocol and work toward improving R shoulder and scapular strength strength, stability, and mobility so that patient may return to desired activities safely and with less discomfort.       Justification for Eval Code Complexity: low  Patient History (low 0, mod 1-2, high 3-4): high   Examination (low 1-2, mod 3+, high 4+): low   Clinical Presentation (low: stable/uncomplicated; mod: evolving; high: unstable/unpredictable): low  Clinical Decision Making (low , mod 26-74, high 1-25): high    [x]  See Plan of Care  []  See Progress Note/ Recertification  []  See Discharge Summary        Patient will continue to benefit from skilled PT services to modify and progress therapeutic interventions, address functional mobility deficits, address ROM deficits, address strength deficits, analyze and address soft tissue restrictions, analyze and cue movement patterns, analyze and modify body mechanics/ergonomics, and assess and modify postural abnormalities  to attain remaining goals   Progress toward goals / Updated goals:    See POC     PLAN    [x]  Upgrade activities as tolerated  [x]  Update interventions per flow sheet YES Continue plan of care   []  Discharge due to :    []  Other:          Therapist: Daisy Davila PT, DPT    Date: 4/11/2022 Time: 8:19 AM     Future Appointments   Date Time Provider Kevin Sarabia   4/11/2022 11:45 AM Radha [address] [facility]   4/14/2022  9:15 AM Barry Helton PA [facility] AMB

## 2022-04-13 ENCOUNTER — HOSPITAL ENCOUNTER (OUTPATIENT)
Dept: PHYSICAL THERAPY | Age: 57
Discharge: HOME | End: 2022-04-13
Payer: COMMERCIAL

## 2022-04-13 PROCEDURE — 97110 THERAPEUTIC EXERCISES: CPT

## 2022-04-13 NOTE — PROGRESS NOTES
PHYSICAL THERAPY - DAILY TREATMENT NOTE    Patient Name: Enid Ruelas        Date: 2022  : 1965   YES Patient  Verified  Visit #:     Insurance: Payor: Ora Mercer / Plan: [employer] HMO/CHOICE PLUS/POS / Product Type: HMO /      In time: 10:25 Out time: 10:50   Total Treatment Time: 25     Medicare/BCBS [place] Time Tracking (below)   Total Timed Codes (min):  25 1:1 Treatment Time:  25     TREATMENT AREA =  S/P Right shd    SUBJECTIVE    Pain Level (on 0 to 10 scale):  6  / 10   Medication Changes/New allergies or changes in medical history, any new surgeries or procedures? NO    If yes, update Summary List   Subjective Functional Status/Changes:  []  No changes reported     Pt 10 mins late and presenting without sling  Encouraged pt to stlela sling.  Pt states she has measured the abd pillow and is using a small couch pillow under her upper arm and is resting her forearm on a pillow while witting (most of the day)          OBJECTIVE      15 min Therapeutic Exercise:  [x]  See flow sheet   Rationale:      increase ROM and increase strength to improve the patients ability to decrease stress on repaired tissues, decrease risk of contracture     10 min Manual Therapy: PROM flex, scap, ER   Rationale:      decrease pain, increase ROM and increase tissue extensibility to improve patient's ability to decrease risk of contracture   The manual therapy interventions were performed at a separate and distinct time from the therapeutic activities interventions     min Patient Education:  YES  Reviewed HEP   []  Progressed/Changed HEP based on:   Issued HEP     Other Objective/Functional Measures:    PROM flex and scap 50deg  PROM ER to neutral     Post Treatment Pain Level (on 0 to 10) scale:   6  / 10     ASSESSMENT    Assessment/Changes in Function:     ABle to tolerate gentle PROM today, reports she took her pain medication today      []  See Progress Note/Recertification Patient will continue to benefit from skilled PT services to analyze, cue, progress, modify,, demonstrate, instruct, and address, movement patterns, therapeutic interventions, postural abnormalities, soft tissue restrictions, ROM, strength, functional mobility, body mechanics/ergonomics, and home and community integration, to attain remaining goals.    Progress toward goals / Updated goals:    Issued HEP     PLAN    []  Upgrade activities as tolerated YES Continue plan of care   []  Discharge due to :    []  Other:      Therapist: Lenora Mahan DPT    Date: 4/13/2022 Time: 10:51 AM     Future Appointments   Date Time Provider Kevin Sarabia   4/14/2022  9:15 AM SHO Prieto [facility] AM[facility]   4/19/2022 10:15 AM Acey Dollar, PT [facility]   4/22/2022 11:45 AM Acey Dollar, PT [facility]   4/26/2022 10:15 AM Acey Dollar, PT [facility]   4/27/2022  1:15 PM Acey Dollar, PT [facility]

## 2022-04-14 ENCOUNTER — OFFICE VISIT (OUTPATIENT)
Dept: ORTHOPEDIC SURGERY | Age: 57
End: 2022-04-14
Payer: COMMERCIAL

## 2022-04-14 VITALS — BODY MASS INDEX: 42.88 KG/M2 | OXYGEN SATURATION: 100 % | WEIGHT: 233 LBS | HEIGHT: 62 IN | HEART RATE: 89 BPM

## 2022-04-14 DIAGNOSIS — Z98.890 S/P ARTHROSCOPY OF RIGHT SHOULDER: Primary | ICD-10-CM

## 2022-04-14 DIAGNOSIS — M75.121 COMPLETE TEAR OF RIGHT ROTATOR CUFF, UNSPECIFIED WHETHER TRAUMATIC: ICD-10-CM

## 2022-04-14 PROCEDURE — 99024 POSTOP FOLLOW-UP VISIT: CPT | Performed by: PHYSICIAN ASSISTANT

## 2022-04-14 RX ORDER — GABAPENTIN 300 MG/1
300 CAPSULE ORAL 2 TIMES DAILY
Qty: 60 CAPSULE | Refills: 0 | Status: SHIPPED | OUTPATIENT
Start: 2022-04-14 | End: 2022-08-15 | Stop reason: ALTCHOICE

## 2022-04-14 NOTE — PROGRESS NOTES
Ovidio Esquivel  1965     HISTORY OF PRESENT ILLNESS  Ovidio Esquivel is a 55 y.o. female who presents today for evaluation s/p Right shoulder arthroscopic 4cm rotator cuff repair on 4/8/22. Patient has been going to PT. Describes pain as a 6/10. Has been taking gabapentin and roxicodone for pain. Still has night pain. Patient denies any fever, chills, chest pain, shortness of breath or calf pain. The remainder of the review of systems is negative. There are no new illness or injuries to report since last seen in the office. No changes in medications, allergies, social or family history. PHYSICAL EXAM:   Visit Vitals  Pulse 89   Ht 5' 2\" (1.575 m)   Wt 233 lb (105.7 kg)   SpO2 100%   BMI 42.62 kg/m²      The patient is a well-developed, well-nourished female in no acute distress. The patient is alert and oriented times three. The patient appears to be well groomed. Mood and affect are normal.  ORTHOPEDIC EXAM of right shoulder:  Inspection: swelling present,  Bruising present  Incision, clean, dry, intact, sutures in place  Passive glenohumeral abduction 0-45 degrees  Stability: Stable  Strength: n/a  2+ distal pulses    IMPRESSION:  S/P Right shoulder arthroscopic 4cm rotator cuff repair    PLAN:   Incisions cleaned. Surgery was discussed at length today. Patient to continue with PROM and PT. Continue wearing sling. Stressed to patient that nothing causes an increase in pain. refill of gabapentin given  RTC 5 weeks    LARISSA Styles [facility]

## 2022-04-14 NOTE — PATIENT INSTRUCTIONS
You may now shower and get your incisions wet. We recommend starting scar massage in 1 week to your incision(s). Take Vitamin E, Cocoa Butter or Scar Cream and massage the incision 2 times a day. This will help soften your incisions and help de-sensitize the skin as the nerves \"wake up\". You are to wear your sling whenever you are up and about, being active, or outside of your home. You may remove your sling when you are sedentary. You may remove your abduction pillow if this is more comfortable. We recommend that you try sleeping in your sling at night. However, if this is extremely intolerable you may remove your sling but prop your arm with some pillows. Remember, you are not to move your arm on your own. Only \"good arm helping the bad arm\". This includes \"chicken winging \" your arm out to the side. You may move your elbow and your wrist for activities such as typing, reading a book, etc.  Please remember that nothing including physical therapy should cause an increase in pain or soreness.

## 2022-04-19 ENCOUNTER — HOSPITAL ENCOUNTER (OUTPATIENT)
Dept: PHYSICAL THERAPY | Age: 57
Discharge: HOME | End: 2022-04-19
Payer: COMMERCIAL

## 2022-04-19 PROCEDURE — 97110 THERAPEUTIC EXERCISES: CPT

## 2022-04-19 NOTE — PROGRESS NOTES
PHYSICAL THERAPY - DAILY TREATMENT NOTE    Patient Name: Cuco Legacy        Date: 2022  : 1965   YES Patient  Verified  Visit #:   3   of   12  Insurance: Payor: Dylon Lynch / Plan: [facility] HMO/CHOICE PLUS/POS / Product Type: HMO /      In time: 12:31 Out time: 1:07   Total Treatment Time: 36     Medicare/BCBS [place] Time Tracking (below)   Total Timed Codes (min):  26 1:1 Treatment Time:  26     TREATMENT AREA =  S/P Right shd    SUBJECTIVE    Pain Level (on 0 to 10 scale):  7  / 10   Medication Changes/New allergies or changes in medical history, any new surgeries or procedures?    no    If yes, update Summary List   Subjective Functional Status/Changes:  []  No changes reported     Reports compliance with HEP. States she is wearing the sling at home but states she is not wearing it at night. \"I told the PA that I can't sleep with it on. I sleep with a CPAP so I don't move around a lot. \"          OBJECTIVE    Modalities Rationale:  decrease edema, decrease inflammation and decrease pain to improve patient's ability to perform ADL's and functional mobility with decreased pain.      min [] Estim, type/location:                                      []  att     []  unatt     []  w/US     []  w/ice    []  w/heat    min []  Mechanical Traction: type/lbs                   []  pro   []  sup   []  int   []  cont    []  before manual    []  after manual    min []  Ultrasound, settings/location:      min []  Iontophoresis w/ dexamethasone, location:                                               []  take home patch       []  in clinic   10 min [x]  Ice     []  Heat    location/position: Supine for CP to right shoulder    min []  Vasopneumatic Device, press/temp:     min []  Other:    [x] Skin assessment post-treatment (if applicable):    [x]  intact    []  redness- no adverse reaction     []redness  adverse reaction:      26 min Therapeutic Exercise:  [x]  See flow sheet (includes PROM to right shoulder for flex, scaption, IR/ER)   Rationale:      increase ROM, increase strength, improve coordination, and increase proprioception to improve the patients ability to perform ADL's and functional mobility with decreased pain and improve joint mechanics. min Patient Education:  YES  Reviewed HEP   []  Progressed/Changed HEP based on:   Progressed HEP per handout     Other Objective/Functional Measures:    Patient presents to PT carrying sling. Advised patient to wear sling at all times and explained surgical precautions. PROM right shoulder flexion and scaption grossly to 80 deg  PROM right shoulder ER to 20 deg     Post Treatment Pain Level (on 0 to 10) scale:   8  / 10     ASSESSMENT    Assessment/Changes in Function:     Patient with fair tolerance with PROM to right shoulder     []  See Progress Note/Recertification   Patient will continue to benefit from skilled PT services to modify and progress therapeutic interventions, address functional mobility deficits, address ROM deficits, address strength deficits, analyze and address soft tissue restrictions, analyze and cue movement patterns, analyze and modify body mechanics/ergonomics, assess and modify postural abnormalities and instruct in home and community integration to attain remaining goals. Progress toward goals / Updated goals: · Short Term Goals: To be accomplished in  3  weeks:          Goal/Measure of Progress Goal Met? 1. Pt will be compliant with HEP for symptom management at home. Status at last Eval: NA Current Status: Compliant with HEP yes   2. Patient will report reduction of pain level at worst to 5/10 to allow for restful night's sleep. Status at last Eval: 6/10 Current Status: 7-8/10 no   3. Patient will demonstrate improved R shoulder PROM flexion to at least 100* to allow for improved functional abilities.     Status at last Eval: 50 deg Current Status: Grossly 80 deg today progressing PLAN    [x]  Upgrade activities as tolerated YES Continue plan of care   []  Discharge due to :    []  Other:      Therapist: Francesco Nieves PT    Date: 4/19/2022 Time: 12:43 PM     Future Appointments   Date Time Provider Kevin Sarabia   4/22/2022 11:45 AM Mila Rocha PT [facility]   4/26/2022 10:15 AM Mila Rocha PT [facility]   4/27/2022  1:15 PM Mila Rocha PT [facility]   5/19/2022 11:15 AM Rin Gregory PA [facility] AMB

## 2022-04-21 DIAGNOSIS — M75.121 COMPLETE TEAR OF RIGHT ROTATOR CUFF, UNSPECIFIED WHETHER TRAUMATIC: Primary | ICD-10-CM

## 2022-04-21 RX ORDER — OXYCODONE HYDROCHLORIDE 5 MG/1
5 TABLET ORAL
Qty: 40 TABLET | Refills: 0 | Status: CANCELLED | OUTPATIENT
Start: 2022-04-21 | End: 2022-04-28

## 2022-04-21 NOTE — TELEPHONE ENCOUNTER
Last Visit: 4/14/22 with SHO Eason  Next Appointment: 5/19/22 with SHO Helton  Previous Refill Encounter(s): 4/5/22 #40    Requested Prescriptions     Pending Prescriptions Disp Refills    oxyCODONE IR (ROXICODONE) 5 mg immediate release tablet 40 Tablet 0     Sig: Take 1 Tablet by mouth every four (4) hours as needed for Pain for up to 7 days. Max Daily Amount: 30 mg.

## 2022-04-22 ENCOUNTER — HOSPITAL ENCOUNTER (OUTPATIENT)
Dept: PHYSICAL THERAPY | Age: 57
Discharge: HOME | End: 2022-04-22
Payer: COMMERCIAL

## 2022-04-22 PROCEDURE — 97110 THERAPEUTIC EXERCISES: CPT

## 2022-04-22 PROCEDURE — 97014 ELECTRIC STIMULATION THERAPY: CPT

## 2022-04-22 RX ORDER — HYDROCODONE BITARTRATE AND ACETAMINOPHEN 7.5; 325 MG/1; MG/1
1 TABLET ORAL
Qty: 28 TABLET | Refills: 0 | Status: SHIPPED | OUTPATIENT
Start: 2022-04-22 | End: 2022-04-29

## 2022-04-22 NOTE — PROGRESS NOTES
PHYSICAL THERAPY - DAILY TREATMENT NOTE    Patient Name: Lety Torres        Date: 2022  : 1965   YES Patient  Verified  Visit #:     Insurance: Payor: Raymundo Hussein / Plan: [employer] HMO/CHOICE PLUS/POS / Product Type: HMO /      In time: 11:45 Out time: 12:32   Total Treatment Time: 47     Medicare/BCBS [place] Time Tracking (below)   Total Timed Codes (min):  35 1:1 Treatment Time:  35     TREATMENT AREA =  Right shoulder pain [M25.511    SUBJECTIVE    Pain Level (on 0 to 10 scale):  4  / 10   Medication Changes/New allergies or changes in medical history, any new surgeries or procedures? NO    If yes, update Summary List   Subjective Functional Status/Changes:  []  No changes reported     I didn't get to take my pain medication before I came today. Pt presents carrying her post op sling in arm. Pt reports having doctor's appointment on the  and was told it was okay not to use ABD pillow. Next f/u May 19. OBJECTIVE    35 min Therapeutic Exercise:  [x]  See flow sheet   Rationale:      increase ROM and increase strength to improve the patients ability to perform ADL's with greater ease. Modalities Rationale:    decrease inflammation and decrease pain to improve patient's ability to complete functional tasks with less pain. 12 min [x] Estim, type/location: IFC post tx R shoulder supine with wedge                                    []  att     [x]  unatt     []  w/US     [x]  w/ice    []  w/heat   [x] Skin assessment post-treatment (if applicable):    [x]  intact    []  redness- no adverse reaction     []redness  adverse reaction:       min Patient Education:  YES  Reviewed HEP   []  Progressed/Changed HEP based on:   Cont with HEP     Other Objective/Functional Measures:    Pt achieved approx 95* passive FL and 80* passive scaption, did not provide any overpressure.  ER approx 20* neutral.      Post Treatment Pain Level (on 0 to 10) scale: 2  / 10     ASSESSMENT    Assessment/Changes in Function:     Session tolerated well with gains in passive ROM all planes. []  See Progress Note/Recertification   Patient will continue to benefit from skilled PT services to analyze, cue, progress, modify,, demonstrate, instruct, and address, movement patterns, therapeutic interventions, postural abnormalities, soft tissue restrictions, ROM, strength, functional mobility, body mechanics/ergonomics, and home and community integration, to attain remaining goals. Progress toward goals / Updated goals: · Short Term Goals: To be accomplished in  3  weeks:          Goal/Measure of Progress Goal Met? 1. Pt will be compliant with HEP for symptom management at home. Status at last Eval: NA Current Status: Unable n/a   2. Patient will report reduction of pain level at worst to 5/10 to allow for restful night's sleep. Status at last Eval: NA Current Status: 10/10 n/a   3. Patient will demonstrate improved R shoulder PROM flexion to at least 100* to allow for improved functional abilities.     Status at last Eval: NA Current Status: 0* n/a          PLAN    []  Upgrade activities as tolerated YES Continue plan of care   []  Discharge due to :    []  Other:      Therapist: Kevin Tiwari PT, DPT    Date: 4/22/2022 Time: 10:44 AM     Future Appointments   Date Time Provider Kevin Sarabia   4/22/2022 11:45 AM Sylvie Velásquez PT [facility]   4/26/2022 10:15 AM Sylvie Velásquez PT [facility]   4/27/2022  1:15 PM Sylvie Velásquez PT [facility]   5/19/2022 11:15 AM Bonnie Santacruz PA [facility] AM[facility]

## 2022-04-26 ENCOUNTER — HOSPITAL ENCOUNTER (OUTPATIENT)
Dept: PHYSICAL THERAPY | Age: 57
Discharge: HOME | End: 2022-04-26
Payer: COMMERCIAL

## 2022-04-26 PROCEDURE — 97014 ELECTRIC STIMULATION THERAPY: CPT

## 2022-04-26 PROCEDURE — 97110 THERAPEUTIC EXERCISES: CPT

## 2022-04-26 NOTE — PROGRESS NOTES
PHYSICAL THERAPY - DAILY TREATMENT NOTE    Patient Name: Raymundo Bolivar        Date: 2022  : 1965   YES Patient  Verified  Visit #:     Insurance: Payor: Bonnydarrel Dunaway / Plan: [employer] HMO/CHOICE PLUS/POS / Product Type: HMO /      In time: 10:15 Out time: 11:00   Total Treatment Time: 45     Medicare/BCBS [place] Time Tracking (below)   Total Timed Codes (min):  45 1:1 Treatment Time:  45     TREATMENT AREA =  Right shoulder pain [M25.511    SUBJECTIVE    Pain Level (on 0 to 10 scale):  4  / 10   Medication Changes/New allergies or changes in medical history, any new surgeries or procedures? NO    If yes, update Summary List   Subjective Functional Status/Changes:  []  No changes reported     Pt reports her shoulder is feeling okay today, wears sling while at home. Pt reports she took her pain medication prior to today's session. OBJECTIVE    33 min Therapeutic Exercise:  [x]  See flow sheet   Rationale:      increase ROM and increase strength to improve the patients ability to perform ADL's with greater ease. Modalities Rationale:    decrease inflammation and decrease pain to improve patient's ability to complete functional tasks with less pain. 12 min [x] Estim, type/location: R shoulder post tx supine with wedge                                     []  att     [x]  unatt     []  w/US     [x]  w/ice    []  w/heat   [x] Skin assessment post-treatment (if applicable):    [x]  intact    []  redness- no adverse reaction     []redness  adverse reaction:       min Patient Education:  YES  Reviewed HEP   []  Progressed/Changed HEP based on:   Cont with HEP     Other Objective/Functional Measures:    Approx 130* passive FL, 90* passive scaption, 80* abduction  20* ER at neutral  No OP provided at end ranges.         Post Treatment Pain Level (on 0 to 10) scale:   2   10     ASSESSMENT    Assessment/Changes in Function:     Pt's passive mobility progressing as expected. []  See Progress Note/Recertification   Patient will continue to benefit from skilled PT services to analyze, cue, progress, modify,, demonstrate, instruct, and address, movement patterns, therapeutic interventions, postural abnormalities, soft tissue restrictions, ROM, strength, functional mobility, body mechanics/ergonomics, and home and community integration, to attain remaining goals. Progress toward goals / Updated goals: · Short Term Goals: To be accomplished in  3  weeks:          Goal/Measure of Progress Goal Met? 1. Pt will be compliant with HEP for symptom management at home. Status at last Eval: NA Current Status: Unable n/a   2. Patient will report reduction of pain level at worst to 5/10 to allow for restful night's sleep. Status at last Eval: NA Current Status: 10/10 n/a   3. Patient will demonstrate improved R shoulder PROM flexion to at least 100* to allow for improved functional abilities.     Status at last Eval: NA Current Status: 0* n/a          PLAN    []  Upgrade activities as tolerated YES Continue plan of care   []  Discharge due to :    []  Other:      Therapist: Florrie Goltz, PT, DPT    Date: 4/26/2022 Time: 10:58 AM     Future Appointments   Date Time Provider Kevin Sarabia   4/27/2022  1:15 PM Mila Rocha PT [facility]   5/9/2022  2:45 PM Mila Rocha PT [facility]   5/11/2022 11:00 AM Mila Rocha PT [facility]   5/18/2022 11:00 AM Mila Rocha PT [facility]   5/19/2022 11:15 AM SHO Alicea [facility] BS AMB   5/24/2022 11:45 AM AMOR Feng [facility]   5/25/2022 11:00 AM AMOR Feng [facility]

## 2022-04-27 ENCOUNTER — HOSPITAL ENCOUNTER (OUTPATIENT)
Dept: PHYSICAL THERAPY | Age: 57
Discharge: HOME | End: 2022-04-27
Payer: COMMERCIAL

## 2022-04-27 PROCEDURE — 97110 THERAPEUTIC EXERCISES: CPT

## 2022-04-27 PROCEDURE — 97014 ELECTRIC STIMULATION THERAPY: CPT

## 2022-04-27 NOTE — PROGRESS NOTES
PHYSICAL THERAPY - DAILY TREATMENT NOTE    Patient Name: Gilda Hylton        Date: 2022  : 1965   YES Patient  Verified  Visit #:     Insurance: Payor: Kirk Certain / Plan: [employer] HMO/CHOICE PLUS/POS / Product Type: HMO /      In time: 1:05 Out time: 1:55   Total Treatment Time: 50     Medicare/BCBS [place] Time Tracking (below)   Total Timed Codes (min):  50 1:1 Treatment Time:  38     TREATMENT AREA =  Right shoulder pain [M25.511    SUBJECTIVE    Pain Level (on 0 to 10 scale):  2  / 10   Medication Changes/New allergies or changes in medical history, any new surgeries or procedures? NO    If yes, update Summary List   Subjective Functional Status/Changes:  []  No changes reported     Pt reports soreness in shoulder after yesterday's session. Took a pain pill prior to today's session. OBJECTIVE    38 min Therapeutic Exercise:  [x]  See flow sheet   Rationale:      increase ROM and increase strength to improve the patients ability to perform ADL's with greater ease. Modalities Rationale:    decrease inflammation and decrease pain to improve patient's ability to complete functional tasks with less pain. 12 min [x] Estim, type/location: R shoulder post tx supine with wedge                                      []  att     [x]  unatt     []  w/US     [x]  w/ice    []  w/heat   [x] Skin assessment post-treatment (if applicable):    [x]  intact    []  redness- no adverse reaction     []redness  adverse reaction:       min Patient Education:  YES  Reviewed HEP   [x]  Progressed/Changed HEP based on:   Progressed HEP     Other Objective/Functional Measures:    Included shoulder isometrics (FL, ADD, and EXT per protocol)  Table slides reintroduced, patient able to complete with improved ability today.       Post Treatment Pain Level (on 0 to 10) scale:   0  / 10     ASSESSMENT    Assessment/Changes in Function:     Session tolerated well, pt progressing as expected. []  See Progress Note/Recertification   Patient will continue to benefit from skilled PT services to analyze, cue, progress, modify,, demonstrate, instruct, and address, movement patterns, therapeutic interventions, postural abnormalities, soft tissue restrictions, ROM, strength, functional mobility, body mechanics/ergonomics, and home and community integration, to attain remaining goals. Progress toward goals / Updated goals: · Short Term Goals: To be accomplished in  3  weeks:                Goal/Measure of Progress Goal Met? 1.  Pt will be compliant with HEP for symptom management at home.    Status at last Eval: Unable Current Status: Kellie Brown   2.  Patient will report reduction of pain level at worst to 5/10 to allow for restful night's sleep.    Status at last Eval: 10/10 Current Status: 10/10 n/a   3.  Patient will demonstrate improved R shoulder PROM flexion to at least 100* to allow for improved functional abilities.    Status at last Eval: 0* Current Status: 120* Yes            PLAN    []  Upgrade activities as tolerated YES Continue plan of care   []  Discharge due to :    []  Other:      Therapist: Francine Aviles PT, DPT    Date: 4/27/2022 Time: 11:02 AM     Future Appointments   Date Time Provider Kevin Sarabia   4/27/2022  1:15 PM Jessica Keith PT [facility]   5/9/2022  2:45 PM Jessica Keith PT [facility]   5/11/2022 11:00 AM Jessica Keith PT [facility]   5/18/2022 11:00 AM Jessica Keith PT [facility]   5/19/2022 11:15 AM SHO Alcocer [facility] AMB   5/24/2022 11:45 AM Jessica Keith PT [facility]   5/25/2022 11:00 AM Jessica Keith PT [facility]

## 2022-05-03 ENCOUNTER — HOSPITAL ENCOUNTER (OUTPATIENT)
Dept: PHYSICAL THERAPY | Age: 57
Discharge: HOME | End: 2022-05-03
Payer: COMMERCIAL

## 2022-05-03 PROCEDURE — 97014 ELECTRIC STIMULATION THERAPY: CPT

## 2022-05-03 PROCEDURE — 97110 THERAPEUTIC EXERCISES: CPT

## 2022-05-03 NOTE — PROGRESS NOTES
PHYSICAL THERAPY - DAILY TREATMENT NOTE    Patient Name: Keena Davis        Date: 5/3/2022  : 1965   YES Patient  Verified  Visit #:     Insurance: Payor: Diego Buster / Plan: [employer] HMO/CHOICE PLUS/POS / Product Type: HMO /      In time: 9:30 Out time: 10:22   Total Treatment Time: 52     Medicare/BCBS [place] Time Tracking (below)   Total Timed Codes (min):  40 1:1 Treatment Time:  40     TREATMENT AREA =  Right shoulder pain [M25.511    SUBJECTIVE    Pain Level (on 0 to 10 scale):  1  / 10   Medication Changes/New allergies or changes in medical history, any new surgeries or procedures? NO    If yes, update Summary List   Subjective Functional Status/Changes:  []  No changes reported     Pt reports soreness in shoulder but no pain, felt okay after last visit. \"The ice and e-stim really helped. \"     Next f/u with MD is 2022       OBJECTIVE    40 min Therapeutic Exercise:  [x]  See flow sheet  PROM all planes, no O/P   Rationale:      increase ROM and increase strength to improve the patients ability to perform ADL's with greater ease. Modalities Rationale:    decrease inflammation and decrease pain to improve patient's ability to complete functional tasks with less pain. 12 min [x] Estim, type/location: Right shoulder post tx supine with ice                                     []  att     [x]  unatt     []  w/US     [x]  w/ice    []  w/heat   [x] Skin assessment post-treatment (if applicable):    [x]  intact    []  redness- no adverse reaction     []redness  adverse reaction:       min Patient Education:  YES  Reviewed HEP   []  Progressed/Changed HEP based on:   Cont with HEP     Other Objective/Functional Measures:    Cont with passive stretching in all planes and sub maximal isometrics (EXT, ADD, FL). No pain reproduced in clinic, no overpressure applied at end ranges.       Post Treatment Pain Level (on 0 to 10) scale:   1  / 10 ASSESSMENT    Assessment/Changes in Function:     Session tolerated well, good PROM all planes, ER most limited but able to achieve approx 20* neutral position before feeling stretch. []  See Progress Note/Recertification   Patient will continue to benefit from skilled PT services to analyze, cue, progress, modify,, demonstrate, instruct, and address, movement patterns, therapeutic interventions, postural abnormalities, soft tissue restrictions, ROM, strength, functional mobility, body mechanics/ergonomics, and home and community integration, to attain remaining goals. Progress toward goals / Updated goals: · Short Term Goals: To be accomplished in  3  weeks:                         Goal/Measure of Progress Goal Met? 1.  Pt will be compliant with HEP for symptom management at home.    Status at last Eval: Unable Current Status: Sherry Glynnr   2.  Patient will report reduction of pain level at worst to 5/10 to allow for restful night's sleep.    Status at last Eval: 10/10 Current Status: 10/10 n/a   3.  Patient will demonstrate improved R shoulder PROM flexion to at least 100* to allow for improved functional abilities.    Status at last Eval: 0* Current Status: 120* Yes          PLAN    []  Upgrade activities as tolerated YES Continue plan of care   []  Discharge due to :    []  Other:      Therapist: Mathieu Terrazas PT, DPT    Date: 5/3/2022 Time: 8:15 AM     Future Appointments   Date Time Provider Kevin Sarabia   5/3/2022  9:30 AM Vera Woodruff, PT [facility]   5/6/2022  8:45 AM Vera Woodruff, PT [facility]   5/9/2022  2:45 PM Vera Woodruff, PT [facility]   5/11/2022 11:00 AM Vera Woodruff, PT [facility]   5/18/2022 11:00 AM Vera Woodruff, PT [facility]   5/19/2022 11:15 AM SHO Conrad [facility] BS AMB   5/24/2022 11:45 AM Vera Woodruff, PT [facility]   5/25/2022 11:00 AM Vera Holland, PT [facility]

## 2022-05-05 DIAGNOSIS — M75.121 COMPLETE TEAR OF RIGHT ROTATOR CUFF, UNSPECIFIED WHETHER TRAUMATIC: Primary | ICD-10-CM

## 2022-05-05 RX ORDER — HYDROCODONE BITARTRATE AND ACETAMINOPHEN 7.5; 325 MG/1; MG/1
1 TABLET ORAL
Qty: 28 TABLET | Refills: 0 | Status: CANCELLED | OUTPATIENT
Start: 2022-05-05 | End: 2022-05-12

## 2022-05-05 NOTE — TELEPHONE ENCOUNTER
Last Visit: 4/14/22 with SHO Eason  Next Appointment: 5/19/22 with SHO Eason  Previous Refill Encounter(s): 4/22/22 #28    Requested Prescriptions     Pending Prescriptions Disp Refills    HYDROcodone-acetaminophen (NORCO) 7.5-325 mg per tablet 28 Tablet 0     Sig: Take 1 Tablet by mouth every six (6) hours as needed for Pain for up to 7 days. Max Daily Amount: 4 Tablets.

## 2022-05-06 ENCOUNTER — APPOINTMENT (OUTPATIENT)
Dept: PHYSICAL THERAPY | Age: 57
End: 2022-05-06
Payer: COMMERCIAL

## 2022-05-06 RX ORDER — HYDROCODONE BITARTRATE AND ACETAMINOPHEN 5; 325 MG/1; MG/1
1 TABLET ORAL
Qty: 28 TABLET | Refills: 0 | Status: SHIPPED | OUTPATIENT
Start: 2022-05-06 | End: 2022-05-13

## 2022-05-09 ENCOUNTER — HOSPITAL ENCOUNTER (OUTPATIENT)
Dept: PHYSICAL THERAPY | Age: 57
Discharge: HOME | End: 2022-05-09
Payer: COMMERCIAL

## 2022-05-09 PROCEDURE — 97110 THERAPEUTIC EXERCISES: CPT

## 2022-05-09 PROCEDURE — 97014 ELECTRIC STIMULATION THERAPY: CPT

## 2022-05-09 NOTE — PROGRESS NOTES
PHYSICAL THERAPY - DAILY TREATMENT NOTE    Patient Name: Sarmad Sarkar        Date: 2022  : 1965   YES Patient  Verified  Visit #:     Insurance: Payor: Gus Mansfield / Plan: [email or web address] HMO/CHOICE PLUS/POS / Product Type: HMO /      In time: 2:52 Out time: 3:40   Total Treatment Time: 48     Medicare/BCBS [place] Time Tracking (below)   Total Timed Codes (min):  48 1:1 Treatment Time:  38     TREATMENT AREA =  Right shoulder pain [M25.511    SUBJECTIVE    Pain Level (on 0 to 10 scale):  1  / 10   Medication Changes/New allergies or changes in medical history, any new surgeries or procedures? NO    If yes, update Summary List   Subjective Functional Status/Changes:  []  No changes reported     Pt reports soreness in shoulder, no pain. OBJECTIVE    38 min Therapeutic Exercise:  [x]  See flow sheet   Rationale:      increase ROM and increase strength to improve the patients ability to perform ADL's with greater ease. Modalities Rationale:    decrease inflammation and decrease pain to improve patient's ability to complete functional tasks with less pain. 10 min [] Estim, type/location: IFC R shoulder post tx seated with arm supported on table                                     []  att     [x]  unatt     []  w/US     [x]  w/ice    []  w/heat   [x] Skin assessment post-treatment (if applicable):    [x]  intact    []  redness- no adverse reaction     []redness  adverse reaction:       min Patient Education:  YES  Reviewed HEP   []  Progressed/Changed HEP based on:   Cont with HEP     Other Objective/Functional Measures:    Pt reports 4/10 pain with isometric shoulder FL at wall, instructed pt to only complete a submaximal contraction.      Post Treatment Pain Level (on 0 to 10) scale:   0  / 10     ASSESSMENT    Assessment/Changes in Function:     Pt currently 4 weeks post op; per protocol unable to move forward with active assist/AROM until cleared by MD. []  See Progress Note/Recertification   Patient will continue to benefit from skilled PT services to analyze, cue, progress, modify,, demonstrate, instruct, and address, movement patterns, therapeutic interventions, postural abnormalities, soft tissue restrictions, ROM, strength, functional mobility, body mechanics/ergonomics, and home and community integration, to attain remaining goals. Progress toward goals / Updated goals:    Goal/Measure of Progress Goal Met? 1.  Pt will be compliant with HEP for symptom management at home.    Status at last Eval: Unable Current Status: Kareem Riding   2.  Patient will report reduction of pain level at worst to 5/10 to allow for restful night's sleep.    Status at last Eval: 10/10 Current Status: 10/10 n/a   3.  Patient will demonstrate improved R shoulder PROM flexion to at least 100* to allow for improved functional abilities.    Status at last Eval: 0* Current Status: 120* Yes          PLAN    []  Upgrade activities as tolerated YES Continue plan of care   []  Discharge due to :    []  Other:      Therapist: Anjel Carrasquillo PT, DPT    Date: 5/9/2022 Time: 12:22 PM     Future Appointments   Date Time Provider Kevin Sarabia   5/9/2022  2:45 PM Isak Carcamo PT [facility]   5/11/2022 11:00 AM Isak Carcamo PT [facility]   5/18/2022 11:00 AM Isak Carcamo PT [facility]   5/19/2022 11:15 AM SHO Weaver [facility] AMB   5/24/2022 11:45 AM Isak Carcamo PT [facility]   5/25/2022 11:00 AM Isak Carcamo PT [facility]

## 2022-05-11 ENCOUNTER — HOSPITAL ENCOUNTER (OUTPATIENT)
Dept: PHYSICAL THERAPY | Age: 57
Discharge: HOME | End: 2022-05-11
Payer: COMMERCIAL

## 2022-05-11 PROCEDURE — 97140 MANUAL THERAPY 1/> REGIONS: CPT

## 2022-05-11 PROCEDURE — 97014 ELECTRIC STIMULATION THERAPY: CPT

## 2022-05-11 PROCEDURE — 97110 THERAPEUTIC EXERCISES: CPT

## 2022-05-11 NOTE — PROGRESS NOTES
PHYSICAL THERAPY - DAILY TREATMENT NOTE    Patient Name: Christiane Mena        Date: 2022  : 1965   YES Patient  Verified  Visit #:     Insurance: Payor: Mayte Garrett / Plan: [employer] HMO/CHOICE PLUS/POS / Product Type: HMO /      In time: 11:00 Out time: 11:52   Total Treatment Time: 52     Medicare/BCBS [place] Time Tracking (below)   Total Timed Codes (min):  42 1:1 Treatment Time:  42     TREATMENT AREA =  Right shoulder pain [M25.511    SUBJECTIVE    Pain Level (on 0 to 10 scale):  2  / 10   Medication Changes/New allergies or changes in medical history, any new surgeries or procedures? NO    If yes, update Summary List   Subjective Functional Status/Changes:  []  No changes reported     Pt reports soreness in arm. She states she is wearing her sling at home and states she is adhering to post op restrictions. F/u Thursday May 11. OBJECTIVE    36 min Therapeutic Exercise:  [x]  See flow sheet  PROM all planes, no OP   Rationale:      increase ROM and increase strength to improve the patients ability to perform ADL's with greater ease. 8 min Manual Therapy: See flow sheet  Scapular join mobs all directions L s/l Postion   MFR and TPR to R upper trap in seated position    Rationale:      decrease pain and increase ROM to improve patient's ability to perform functional mobility with less compensation and pain. The manual therapy interventions were performed at a separate and distinct time from the therapeutic activities interventions    Modalities Rationale:    decrease inflammation and decrease pain to improve patient's ability to complete functional tasks with less pain.    10 min [x] Estim, type/location: IFC R shoulder post tx supine with wedge                                     []  att     [x]  unatt     []  w/US     []  w/ice    [x]  w/heat   [x] Skin assessment post-treatment (if applicable):    [x]  intact    []  redness- no adverse reaction     []redness  adverse reaction:       min Patient Education:  YES  Reviewed HEP   []  Progressed/Changed HEP based on:   Cont with HEP     Other Objective/Functional Measures:    Cont with outlined TE program per post op protocol. Post Treatment Pain Level (on 0 to 10) scale:   1  / 10     ASSESSMENT    Assessment/Changes in Function:     Pt demonstrating gains in passive ER from last visit. Close to full PROM FL, limited to approx 100* abduction. []  See Progress Note/Recertification   Patient will continue to benefit from skilled PT services to analyze, cue, progress, modify,, demonstrate, instruct, and address, movement patterns, therapeutic interventions, postural abnormalities, soft tissue restrictions, ROM, strength, functional mobility, body mechanics/ergonomics, and home and community integration, to attain remaining goals. Progress toward goals / Updated goals:    Goal/Measure of Progress Goal Met? 1.  Pt will be compliant with HEP for symptom management at home.    Status at last Eval: Unable Current Status: Heidy Merazd   2.  Patient will report reduction of pain level at worst to 5/10 to allow for restful night's sleep.    Status at last Eval: 10/10 Current Status: 10/10 n/a   3.  Patient will demonstrate improved R shoulder PROM flexion to at least 100* to allow for improved functional abilities.    Status at last Eval: 0* Current Status: 120* Yes          PLAN    []  Upgrade activities as tolerated YES Continue plan of care   []  Discharge due to :    []  Other:      Therapist: Nathalia Castro PT, DPT    Date: 5/11/2022 Time: 11:41 AM     Future Appointments   Date Time Provider Kevin Sarabia   5/18/2022 11:00 AM Pete Key PT [facility]   5/19/2022 11:15 AM SHO Prieto [facility] AMB   5/24/2022 11:45 AM Pete Key PT [facility]   5/25/2022 11:00 AM Pete Key PT [facility]

## 2022-05-18 ENCOUNTER — HOSPITAL ENCOUNTER (OUTPATIENT)
Dept: PHYSICAL THERAPY | Age: 57
Discharge: HOME | End: 2022-05-18
Payer: COMMERCIAL

## 2022-05-18 PROCEDURE — 97110 THERAPEUTIC EXERCISES: CPT

## 2022-05-18 PROCEDURE — 97140 MANUAL THERAPY 1/> REGIONS: CPT

## 2022-05-18 PROCEDURE — 97530 THERAPEUTIC ACTIVITIES: CPT

## 2022-05-18 PROCEDURE — 97014 ELECTRIC STIMULATION THERAPY: CPT

## 2022-05-18 NOTE — PROGRESS NOTES
[address] [place] [address] PHYSICAL THERAPY  [facility] S[address] Br[address], [address] - Phone: [phone]  Fax: [phone]  Progress Note/CONTINUED PLAN OF CARE for PHYSICAL THERAPY          Patient Name: Karl Robison : 1965   Treatment/Medical Diagnosis: Right shoulder pain [M25.511]   Referral Source: [facility] Azeem [facility]): 22   Prior Hospitalization: See Medical History Provider #: [id number]   Medications: Verified on Patient Summary List   Visits from [facility]: 10 Missed Visits: 1     Goal/Measure of Progress Goal Met? 1. Patient will report reduction of pain level at worst to 5/10 to allow for restful night's sleep. Status at last Eval: 10/10 Current Status: 4-5/10 yes   2. Patient will demonstrate improved R shoulder PROM flexion to at least 100* to allow for improved functional abilities. Status at last Eval: 0* Current Status: 155* yes   3. Pt will be compliant with HEP for symptom management at home. Status at last Eval: Established  Current Status: Able  yes     PROM:   FL: 155*  ABD: 105*  ER at approx 30* abd: 60*  IR at approx 30* abd: 60*     Key Functional Changes/Progress: Reassessment performed today. Pt reports 50% improvement in symptoms. Pt reports taking pain medications as prescribed, only taking it prior to PT. Pt demonstrates gains in PROM in all planes of motion. PT sessions have consisted of PROM activities primarily. Awaiting MD authorization at patient's next visit to progress into AAROM per post op protocol. Pt is able to demonstrate independence with learned exercises. Thank you for allowing me to assist with this case.      Problem List: pain affecting function, decrease ROM, decrease strength, decrease ADL/ functional abilitiies, decrease activity tolerance, decrease flexibility/ joint mobility and decrease transfer abilities   Treatment Plan may include any combination of the following: Therapeutic exercise, Therapeutic activities, Neuromuscular re-education, Physical agent/modality, Manual therapy, Patient education, Self Care training, Functional mobility training and Home safety training  Patient Goal(s) has been updated and includes:      Goals for this certification period include and are to be achieved in   4  weeks:  1. Pt will demonstrate at least 150* active shoulder FL at allow her to reach into kitchen cabinet. 2. Pt will demonstrate 4/5 shoulder ER strength to allow her to complete dressing tasks without compensation. 3. Pt will demonstrate at least 130* active shoulder ABD to allow her to wash hair. 4. Pt will be independent with HEP at D/C for self management. 5. Patient will increase FOTO Functional Status score to 30 to decrease functional limitations. Frequency / Duration:   Patient to be seen   2   times per week for   6    weeks:    Assessments/Recommendations: Patient would benefit from continuation of skilled PT services to address deficits noted above. If you have any questions/comments please contact us directly at [phone]. Thank you for allowing us to assist in the care of your patient. Therapist Signature: Vilma Hunter PT, DPT Date: 5/97/2108   Certification Period:  Reporting Period: na na Time: 10:33 AM   NOTE TO PHYSICIAN:  PLEASE COMPLETE THE ORDERS BELOW AND FAX TO   [facility] [place] [facility]: [phone]. If you are unable to process this request in 24 hours please contact our office: [phone].    ___ I have read the above report and request that my patient continue as recommended.   ___ I have read the above report and request that my patient continue therapy with the following changes/special instructions: ________________________________________________   ___ I have read the above report and request that my patient be discharged from therapy.      Physician Signature:        Date:       Time:

## 2022-05-18 NOTE — PROGRESS NOTES
PHYSICAL THERAPY - DAILY TREATMENT NOTE    Patient Name: Ovidio Esquivel        Date: 2022  : 1965   YES Patient  Verified  Visit #:   10   of   12  Insurance: Payor: Xander Reason / Plan: [employer] HMO/CHOICE PLUS/POS / Product Type: HMO /      In time: 11:02 Out time: 10:52   Total Treatment Time: 50     Medicare/BCBS [place] Time Tracking (below)   Total Timed Codes (min):  50 1:1 Treatment Time:  40     TREATMENT AREA =  Right shoulder pain [M25.511    SUBJECTIVE    Pain Level (on 0 to 10 scale):  1  / 10   Medication Changes/New allergies or changes in medical history, any new surgeries or procedures? NO    If yes, update Summary List   Subjective Functional Status/Changes:  []  No changes reported     Patient with no new complaints. Has a f/u with MD tomorrow. OBJECTIVE    10 min Therapeutic Exercise:  [x]  See flow sheet   Rationale:      increase ROM and increase strength to improve the patients ability to perform ADL's with greater ease. 10 min Therapeutic Activity: See flow sheet  Reassessment    Rationale:   increase mobility, endurance, and strength to improve patient's ability to complete functional tasks with greater ease. 20 min Manual Therapy: See flow sheet  AP mobs GHJ  PROM al planes    Rationale:      decrease pain and increase ROM to improve patient's ability to perform functional mobility with less compensation and pain. The manual therapy interventions were performed at a separate and distinct time from the therapeutic activities interventions     Modalities Rationale:    decrease inflammation and decrease pain to improve patient's ability to complete functional tasks with less pain.    10 min [x] Estim, type/location: IFC R shoulder post tx sitting                                      []  att     [x]  unatt     []  w/US     [x]  w/ice    []  w/heat   [x] Skin assessment post-treatment (if applicable):    [x]  intact    []  redness- no adverse reaction     []redness  adverse reaction:       min Patient Education:  YES  Reviewed HEP   []  Progressed/Changed HEP based on:   Cont with HEP     Other Objective/Functional Measures:    Refer to PN     strength R: 30, 25, 20  L : 45, 45, 45     Post Treatment Pain Level (on 0 to 10) scale:   0  / 10     ASSESSMENT    Assessment/Changes in Function:     Refer to PN     []  See Progress Note/Recertification   Patient will continue to benefit from skilled PT services to analyze, cue, progress, modify,, demonstrate, instruct, and address, movement patterns, therapeutic interventions, postural abnormalities, soft tissue restrictions, ROM, strength, functional mobility, body mechanics/ergonomics, and home and community integration, to attain remaining goals.    Progress toward goals / Updated goals:    Refer to PN     PLAN    []  Upgrade activities as tolerated YES Continue plan of care   []  Discharge due to :    []  Other:      Therapist: Francine Aviles PT, DPT    Date: 5/18/2022 Time: 9:56 AM     Future Appointments   Date Time Provider Kevin Sarabia   5/18/2022 11:00 AM Jessica Keith PT [facility]   5/19/2022 11:15 AM SHO [place]er VS BS AMB   5/24/2022 11:45 AM [facility]   5/25/2022 11:00 AM Jessica Keith PT [facility]

## 2022-05-19 ENCOUNTER — OFFICE VISIT (OUTPATIENT)
Dept: ORTHOPEDIC SURGERY | Age: 57
End: 2022-05-19
Payer: COMMERCIAL

## 2022-05-19 VITALS
WEIGHT: 245 LBS | HEIGHT: 62 IN | BODY MASS INDEX: 45.08 KG/M2 | HEART RATE: 67 BPM | TEMPERATURE: 97.8 F | OXYGEN SATURATION: 100 %

## 2022-05-19 DIAGNOSIS — M75.121 COMPLETE TEAR OF RIGHT ROTATOR CUFF, UNSPECIFIED WHETHER TRAUMATIC: Primary | ICD-10-CM

## 2022-05-19 PROCEDURE — 99024 POSTOP FOLLOW-UP VISIT: CPT | Performed by: PHYSICIAN ASSISTANT

## 2022-05-19 RX ORDER — HYDROCODONE BITARTRATE AND ACETAMINOPHEN 5; 325 MG/1; MG/1
1 TABLET ORAL
Qty: 28 TABLET | Refills: 0 | Status: SHIPPED | OUTPATIENT
Start: 2022-05-19 | End: 2022-05-26

## 2022-05-19 NOTE — PATIENT INSTRUCTIONS
You may remove your sling now. You may begin to move your arm on your own . Continue with no lifting, pushing or pulling anything heavier than 5lbs until you are seen again in 6 weeks. Remember nothing causes an increase in pain including physical therapy.

## 2022-05-19 NOTE — PROGRESS NOTES
[facility]  1965     HISTORY OF PRESENT ILLNESS  Arnaud Chanel is a 55 y.o. female who presents today for evaluation s/p Right shoulder arthroscopic 4cm rotator cuff repair on 4/8/22. Patient has been going to PT. Describes pain as a 3/10. Patient denies any fever, chills, chest pain, shortness of breath or calf pain. The remainder of the review of systems is negative. There are no new illness or injuries to report since last seen in the office. No changes in medications, allergies, social or family history. PHYSICAL EXAM:   Visit Vitals  Pulse 67   Temp 97.8 °F (36.6 °C)   Ht 5' 2\" (1.575 m)   Wt 245 lb (111.1 kg)   SpO2 100%   BMI 44.81 kg/m²      The patient is a well-developed, well-nourished female in no acute distress. The patient is alert and oriented times three. The patient appears to be well groomed. Mood and a  ffect are normal.  ORTHOPEDIC EXAM of right shoulder:  Inspection: swelling not present,  Bruising not present  Incisions well healed  Passive glenohumeral abduction 0-90 degrees  Stability: Stable  Strength: n/a  2+ distal pulses    IMPRESSION:  S/P Right shoulder arthroscopic 4cm rotator cuff repair    PLAN:   1. Patient improving post operatively  2. Will cont with PT. Ok to d/c sling now and start arom. Stressed no increases in pain  3.  Will remain on no duty status x 4 weeks      LARISSA Brooke [facility]

## 2022-05-23 ENCOUNTER — DOCUMENTATION ONLY (OUTPATIENT)
Dept: ORTHOPEDIC SURGERY | Age: 57
End: 2022-05-23

## 2022-05-24 ENCOUNTER — HOSPITAL ENCOUNTER (OUTPATIENT)
Dept: PHYSICAL THERAPY | Age: 57
Discharge: HOME | End: 2022-05-24
Payer: COMMERCIAL

## 2022-05-24 PROCEDURE — 97110 THERAPEUTIC EXERCISES: CPT

## 2022-05-24 PROCEDURE — 97140 MANUAL THERAPY 1/> REGIONS: CPT

## 2022-05-24 PROCEDURE — 97014 ELECTRIC STIMULATION THERAPY: CPT

## 2022-05-24 NOTE — PROGRESS NOTES
PHYSICAL THERAPY - DAILY TREATMENT NOTE    Patient Name: Olivier Davis        Date: 2022  : 1965   YES Patient  Verified  Visit #:     Insurance: Payor: Alla Erika / Plan: [employer] HMO/CHOICE PLUS/POS / Product Type: HMO /      In time: 11:52 Out time: 12:45   Total Treatment Time: 52     Medicare/BCBS [place] Time Tracking (below)   Total Timed Codes (min):  52 1:1 Treatment Time:  42     TREATMENT AREA =  Right shoulder pain [M25.511]    SUBJECTIVE    Pain Level (on 0 to 10 scale):  0  / 10   Medication Changes/New allergies or changes in medical history, any new surgeries or procedures? NO    If yes, update Summary List   Subjective Functional Status/Changes:  []  No changes reported     Pt reports doctor's appointment went well. New prescription states \"cont with PT shoulder, OK for AROM now. \"           OBJECTIVE    30 min Therapeutic Exercise:  [x]  See flow sheet   Rationale:      increase ROM and increase strength to improve the patients ability to perform ADL's with greater ease. 12 min Manual Therapy: See flow sheet  PROM all planes  AP GHJ mobs   Rationale:      decrease pain, increase ROM and increase tissue extensibility to improve patient's ability to perform functional mobility with less compensation and pain. The manual therapy interventions were performed at a separate and distinct time from the therapeutic activities interventions    Modalities Rationale:    decrease pain and increase tissue extensibility to improve patient's ability to complete functional tasks with less pain.    10 min [x] Estim, type/location: R shoulder post tx supine with wedge                                      []  att     [x]  unatt     []  w/US     [x]  w/ice    []  w/heat   [x] Skin assessment post-treatment (if applicable):    [x]  intact    []  redness- no adverse reaction     []redness  adverse reaction:       min Patient Education:  YES  Reviewed HEP   [] Progressed/Changed HEP based on:   Update NV     Other Objective/Functional Measures:    Progressed therex to include AAROM in all directions, pt tolerated well. Included manual stretching and anterior/posterior shoulder mobs to address ROM deficits and pain. Exercises and manual techniques carried out by Olga Lidia Garcia DPT under direct supervision of PT      Post Treatment Pain Level (on 0 to 10) scale:   0  / 10     ASSESSMENT    Assessment/Changes in Function:     Pt is progressing well towards ROM goals, biggest deficit noted in abduction. Pt would continue to benefit from ROM and strengthening exercises. []  See Progress Note/Recertification   Patient will continue to benefit from skilled PT services to analyze, cue, progress, modify,, demonstrate, instruct, and address, movement patterns, therapeutic interventions, postural abnormalities, soft tissue restrictions, ROM, strength, functional mobility, body mechanics/ergonomics, and home and community integration, to attain remaining goals. Progress toward goals / Updated goals:    · weeks:  1. Pt will demonstrate at least 150* active shoulder FL at allow her to reach into kitchen cabinet. 2. Pt will demonstrate 4/5 shoulder ER strength to allow her to complete dressing tasks without compensation. 3. Pt will demonstrate at least 130* active shoulder ABD to allow her to wash hair. 4. Pt will be independent with HEP at D/C for self management. 5. Patient will increase FOTO Functional Status score to 30 to decrease functional limitations.      PLAN    []  Upgrade activities as tolerated YES Continue plan of care   []  Discharge due to :    []  Other:      Therapist: Vilma Hunter PT, DPT    Date: 5/24/2022 Time: 11:58 AM     Future Appointments   Date Time Provider Kevin Sarabia   5/25/2022 11:00 AM Sherie Lab, PT [facility]   6/1/2022 11:00 AM Sherie Lab, PT [facility]   6/2/2022 11:00 AM Sherie Lab, PT [facility]   6/8/2022 2:45 PM Ivonne Fletcher, PT [facility]   6/9/2022 11:45 AM Ivonne Fletcher, PT [facility]   6/13/2022 11:00 AM Ivonne Fletcher, PT [facility]   6/15/2022  2:45 PM Ivonne Fletcher, PT [facility]   6/16/2022 10:45 AM SHO [place] [facility] BS AMB   6/20/2022 11:00 AM Ivonne Fletcher, PT [facility]   6/22/2022  2:45 PM Ivonne Fletcher, PT [facility]   6/29/2022  2:45 PM Ivonne Fletcher, PT [facility]

## 2022-05-25 ENCOUNTER — TELEPHONE (OUTPATIENT)
Dept: ORTHOPEDIC SURGERY | Age: 57
End: 2022-05-25

## 2022-05-25 ENCOUNTER — HOSPITAL ENCOUNTER (OUTPATIENT)
Dept: PHYSICAL THERAPY | Age: 57
Discharge: HOME | End: 2022-05-25
Payer: COMMERCIAL

## 2022-05-25 PROCEDURE — 97140 MANUAL THERAPY 1/> REGIONS: CPT

## 2022-05-25 PROCEDURE — 97110 THERAPEUTIC EXERCISES: CPT

## 2022-05-25 NOTE — TELEPHONE ENCOUNTER
Patient called to inquire about completed disability forms. Advised patient of documentation stating received on 5/23/22. Attempted to advise patient of turnaround time for form completion and she stated this is not acceptable because \"all you have to do is send office notes and sign it. \".  Patient is requesting call back    Phn#: [phone]

## 2022-05-25 NOTE — PROGRESS NOTES
PHYSICAL THERAPY - DAILY TREATMENT NOTE    Patient Name: Raymundo Bolivar        Date: 2022  : 1965   YES Patient  Verified  Visit #:     Insurance: Payor: Bonny Severinoallum / Plan: [employer] HMO/CHOICE PLUS/POS / Product Type: HMO /      In time: 11:04 Out time: 12:00   Total Treatment Time: 56     Medicare/BCBS [place] Time Tracking (below)   Total Timed Codes (min):  56 1:1 Treatment Time:  46     TREATMENT AREA =  Right shoulder pain [M25.511]    SUBJECTIVE    Pain Level (on 0 to 10 scale):  1-2   10   Medication Changes/New allergies or changes in medical history, any new surgeries or procedures? NO    If yes, update Summary List   Subjective Functional Status/Changes:  []  No changes reported     Pt reports her L arm is more sore than her R. Mild soreness in R biceps currently likely from addition of new exercises previous visit. OBJECTIVE    36 min Therapeutic Exercise:  [x]  See flow sheet   Rationale:      increase ROM and increase strength to improve the patients ability to perform ADL's with greater ease. 10 min Manual Therapy: See flow sheet  PA mobs to thoracic spine  PROM all planes    Rationale:      decrease pain, increase ROM and increase tissue extensibility to improve patient's ability to perform functional mobility with less compensation and pain. The manual therapy interventions were performed at a separate and distinct time from the therapeutic activities interventions    Modalities Rationale:    decrease inflammation and decrease pain to improve patient's ability to complete functional tasks with less pain.     min [] Estim, type/location:                                      []  att     []  unatt     []  w/US     []  w/ice    []  w/heat    min []  Mechanical Traction: type/lbs                   []  pro   []  sup   []  int   []  cont    []  before manual    []  after manual    min []  Ultrasound, settings/location:      min [] Iontophoresis w/ dexamethasone, location:                                               []  take home patch-6hour remove at        []  in clinic    min []  Ice     []  Heat    location/position:     min []  Vasopneumatic Device, press/temp:     min []  Other:    [x] Skin assessment post-treatment (if applicable):    [x]  intact    []  redness- no adverse reaction     []redness  adverse reaction:       min Patient Education:  YES  Reviewed HEP   [x]  Progressed/Changed HEP based on:   Updated HEP     Other Objective/Functional Measures:    Introduced thoracic mobility exercises to improve ROM. Manual techniques included PA mobs to address mobility deficits. Good tolerance to new AAROM exercises. Sub max isometrics to GHJ included to address strength impairments. Post Treatment Pain Level (on 0 to 10) scale:   0  / 10     ASSESSMENT    Assessment/Changes in Function:     Pt currently 6 weeks post op. Pt progressing as expected per protocol. ROM steadily improving in all directions. Continue progressing as symptoms allow. []  See Progress Note/Recertification   Patient will continue to benefit from skilled PT services to analyze, cue, progress, modify,, demonstrate, instruct, and address, movement patterns, therapeutic interventions, postural abnormalities, soft tissue restrictions, ROM, strength, functional mobility, body mechanics/ergonomics, and home and community integration, to attain remaining goals. Progress toward goals / Updated goals:    1. Pt will demonstrate at least 150* active shoulder FL at allow her to reach into kitchen cabinet. 2. Pt will demonstrate 4/5 shoulder ER strength to allow her to complete dressing tasks without compensation. 3. Pt will demonstrate at least 130* active shoulder ABD to allow her to wash hair.   4. Pt will be independent with HEP at D/C for self management. 5. Patient will increase FOTO Functional Status score to 30 to decrease functional limitations. PLAN    []  Upgrade activities as tolerated YES Continue plan of care   []  Discharge due to :    []  Other:      Therapist: Daisy Davila, PT, DPT    Date: 5/25/2022 Time: 9:33 AM     Future Appointments   Date Time Provider Kevin Sarabia   5/25/2022 11:00 AM Kodi Acres, PT [facility]   6/1/2022 11:00 AM Kodi Acres, PT [facility]   6/2/2022 11:00 AM Kodi Acres, PT [facility]   6/8/2022  2:45 PM Kodi Acres, PT [facility]   6/9/2022 11:45 AM Kodi Acres, PT [facility]   6/13/2022 11:00 AM Kodi Acres, PT [facility]   6/15/2022  2:45 PM Kodi Acres, PT [facility]   6/16/2022 10:45 AM SHO [facility] BS AMB   6/20/2022 11:00 AM Kodi Acres, PT [facility]   6/22/2022  2:45 PM Kodi Acres, PT [facility]   6/29/2022  2:45 PM Kodi Acres, PT [facility]

## 2022-06-01 ENCOUNTER — HOSPITAL ENCOUNTER (OUTPATIENT)
Dept: PHYSICAL THERAPY | Age: 57
Discharge: HOME | End: 2022-06-01
Payer: COMMERCIAL

## 2022-06-01 PROCEDURE — 97110 THERAPEUTIC EXERCISES: CPT

## 2022-06-01 NOTE — PROGRESS NOTES
PHYSICAL THERAPY - DAILY TREATMENT NOTE    Patient Name: Lucy Mendieta        Date: 2022  : 1965   YES Patient  Verified  Visit #:   15   of   24  Insurance: Payor: Fay Angus / Plan: [employer] HMO/CHOICE PLUS/POS / Product Type: HMO /      In time: 11:08 Out time: 11:46   Total Treatment Time: 38     Medicare/BCBS [place] Time Tracking (below)   Total Timed Codes (min):  38 1:1 Treatment Time:  38     TREATMENT AREA =  Right shoulder pain [M25.511]    SUBJECTIVE    Pain Level (on 0 to 10 scale):  1-2  / 10   Medication Changes/New allergies or changes in medical history, any new surgeries or procedures? NO    If yes, update Summary List   Subjective Functional Status/Changes:  []  No changes reported     Pt reports ongoing soreness throughout R shoulder. Wakes her up at night. OBJECTIVE    38 min Therapeutic Exercise:  [x]  See flow sheet   Rationale:      increase ROM and increase strength to improve the patients ability to perform ADL's with greater ease. min Patient Education:  YES  Reviewed HEP   []  Progressed/Changed HEP based on:   Cont with HEP     Other Objective/Functional Measures:    Did not progress exercises today secondary to increased shoulder soreness. Post Treatment Pain Level (on 0 to 10) scale:   1-2  / 10     ASSESSMENT    Assessment/Changes in Function:     Pt demonstrating good tolerance to exercises. Steadily improving towards shoulder mobility in all planes. []  See Progress Note/Recertification   Patient will continue to benefit from skilled PT services to analyze, cue, progress, modify,, demonstrate, instruct, and address, movement patterns, therapeutic interventions, postural abnormalities, soft tissue restrictions, ROM, strength, functional mobility, body mechanics/ergonomics, and home and community integration, to attain remaining goals.    Progress toward goals / Updated goals:    1. Pt will demonstrate at least 150* active shoulder FL at allow her to reach into kitchen cabinet. 2. Pt will demonstrate 4/5 shoulder ER strength to allow her to complete dressing tasks without compensation. 3. Pt will demonstrate at least 130* active shoulder ABD to allow her to wash hair.   4. Pt will be independent with HEP at D/C for self management. 5. Patient will increase FOTO Functional Status score to 30 to decrease functional limitations.        PLAN    []  Upgrade activities as tolerated YES Continue plan of care   []  Discharge due to :    []  Other:      Therapist: Florrie Goltz, PT, DPT    Date: 6/1/2022 Time: 8:53 AM     Future Appointments   Date Time Provider Kevin Sarabia   6/1/2022 11:00 AM Mila Rocha PT [facility]   6/2/2022 11:00 AM Mila Rocha PT [facility]   6/8/2022  2:45 PM Mila Rocha PT [facility]   6/9/2022 11:45 AM Mila Rocha PT [facility]   6/13/2022 11:00 AM Mila Rocha PT [facility]   6/15/2022  2:45 PM Mila Rocha PT [facility]   6/16/2022 10:45 AM SHO [place] [facility] BS AMB   6/20/2022 11:00 AM Mila Rocha PT [facility]   6/22/2022  2:45 PM Mila Rocha PT [facility]   6/29/2022  2:45 PM Mila Rocha PT [facility]

## 2022-06-02 ENCOUNTER — HOSPITAL ENCOUNTER (OUTPATIENT)
Dept: PHYSICAL THERAPY | Age: 57
Discharge: HOME | End: 2022-06-02
Payer: COMMERCIAL

## 2022-06-02 PROCEDURE — 97140 MANUAL THERAPY 1/> REGIONS: CPT

## 2022-06-02 PROCEDURE — 97014 ELECTRIC STIMULATION THERAPY: CPT

## 2022-06-02 PROCEDURE — 97110 THERAPEUTIC EXERCISES: CPT

## 2022-06-02 NOTE — PROGRESS NOTES
PHYSICAL THERAPY - DAILY TREATMENT NOTE    Patient Name: Leanne Perales        Date: 2022  : 1965   YES Patient  Verified  Visit #:     Insurance: Payor: Hieu Strickland / Plan: [employer] HMO/CHOICE PLUS/POS / Product Type: HMO /      In time: 11:10 Out time: 12:08   Total Treatment Time: 58     Medicare/BCBS [place] Time Tracking (below)   Total Timed Codes (min):  58 1:1 Treatment Time:  48     TREATMENT AREA =  Right shoulder pain [M25.511]    SUBJECTIVE    Pain Level (on 0 to 10 scale):  1  / 10   Medication Changes/New allergies or changes in medical history, any new surgeries or procedures? NO    If yes, update Summary List   Subjective Functional Status/Changes:  []  No changes reported     Pt reports soreness in shoulder, no pain. OBJECTIVE    38 min Therapeutic Exercise:  [x]  See flow sheet   Rationale:      increase ROM and increase strength to improve the patients ability to perform ADL's with greater ease. 10 min Manual Therapy: See flow sheet  PROM all planes  PA mobs GHJ grades I-III   Rationale:      decrease pain, increase ROM and increase tissue extensibility to improve patient's ability to perform functional mobility with less compensation and pain. The manual therapy interventions were performed at a separate and distinct time from the therapeutic activities interventions    Modalities Rationale:    decrease inflammation and decrease pain to improve patient's ability to complete functional tasks with less pain.    12 min [x] Estim, type/location: IFC R shoulder post tx supine with wedge                                     []  att     [x]  unatt     []  w/US     [x]  w/ice    []  w/heat   [x] Skin assessment post-treatment (if applicable):    [x]  intact    []  redness- no adverse reaction     []redness - adverse reaction:       min Patient Education:  YES  Reviewed HEP   []  Progressed/Changed HEP based on:   Cont with HEP     Other Objective/Functional Measures:    Progression of reps for all exercises. Included TRX for shoulder FL mobility. Pt demonstrates close to full range passive/AA shoulder FL and abduction. Close to full range ER passively at approx 60* abduction  Remains limited in passive IR although is steadily improving. Post Treatment Pain Level (on 0 to 10) scale:   0  / 10     ASSESSMENT    Assessment/Changes in Function:     Pt will be 8 weeks post op tomorrow (6/3/2022)     Compensatory shoulder shrug with rows. Continue working to encourage normal movement patterns with newly introduced AROM exercises. Consider progressing to active shoulder ABD and scaption next visit. []  See Progress Note/Recertification   Patient will continue to benefit from skilled PT services to analyze, cue, progress, modify,, demonstrate, instruct, and address, movement patterns, therapeutic interventions, postural abnormalities, soft tissue restrictions, ROM, strength, functional mobility, body mechanics/ergonomics, and home and community integration, to attain remaining goals. Progress toward goals / Updated goals:    1. Pt will demonstrate at least 150* active shoulder FL at allow her to reach into kitchen cabinet. 2. Pt will demonstrate 4/5 shoulder ER strength to allow her to complete dressing tasks without compensation. 3. Pt will demonstrate at least 130* active shoulder ABD to allow her to wash hair.   4. Pt will be independent with HEP at D/C for self management. 5. Patient will increase FOTO Functional Status score to 30 to decrease functional limitations.      PLAN    []  Upgrade activities as tolerated YES Continue plan of care   []  Discharge due to :    []  Other:      Therapist: Lion Mireles PT, DPT    Date: 6/2/2022 Time: 9:58 AM     Future Appointments   Date Time Provider Kevin Sarabia   6/2/2022 11:00 AM Jada Stauffer PT [facility]   6/8/2022  2:45 PM Jada Stauffer PT [facility]   6/9/2022 11:45 AM Segundo Hanley, PT [facility]   6/13/2022 11:00 AM Segundo Hanley, PT [facility]   6/15/2022  2:45 PM Segundo Hanley, PT [facility]   6/16/2022 10:45 AM SHO [facility] BS AMB   6/20/2022 11:00 AM Segundo Hanley, PT [facility]   6/22/2022  2:45 PM Segundo Hanley, PT [facility]   6/29/2022  2:45 PM [facility]

## 2022-06-08 ENCOUNTER — HOSPITAL ENCOUNTER (OUTPATIENT)
Dept: PHYSICAL THERAPY | Age: 57
Discharge: HOME | End: 2022-06-08
Payer: COMMERCIAL

## 2022-06-08 PROCEDURE — 97110 THERAPEUTIC EXERCISES: CPT

## 2022-06-08 NOTE — PROGRESS NOTES
PHYSICAL THERAPY - DAILY TREATMENT NOTE    Patient Name: Cristofer Skaggs        Date: 2022  : 1965   YES Patient  Verified  Visit #:   15   of   24  Insurance: Payor: Rimma Jessica / Plan: [employer] HMO/CHOICE PLUS/POS / Product Type: HMO /      In time: 2:50 Out time: 3:28   Total Treatment Time: 38     Medicare/BCBS [place] Time Tracking (below)   Total Timed Codes (min):  38 1:1 Treatment Time:  38     TREATMENT AREA =  Right shoulder pain [M25.511]    SUBJECTIVE    Pain Level (on 0 to 10 scale):  2  / 10   Medication Changes/New allergies or changes in medical history, any new surgeries or procedures? NO    If yes, update Summary List   Subjective Functional Status/Changes:  []  No changes reported     Next follow up scheduled for 2022          OBJECTIVE    38 min Therapeutic Exercise:  [x]  See flow sheet   Rationale:      increase ROM and increase strength to improve the patients ability to perform ADL's with greater ease. min Patient Education:  YES  Reviewed HEP   []  Progressed/Changed HEP based on:   Cont with HEP     Other Objective/Functional Measures:    AROM * (L 170*)  EXT equal to L (bilateral 50*)  * (L: 180)     Post Treatment Pain Level (on 0 to 10) scale:   2  / 10     ASSESSMENT    Assessment/Changes in Function:     Pt 8 weeks post op currently. Tolerated progression of exercises per protocol well.      []  See Progress Note/Recertification   Patient will continue to benefit from skilled PT services to analyze, cue, progress, modify,, demonstrate, instruct, and address, movement patterns, therapeutic interventions, postural abnormalities, soft tissue restrictions, ROM, strength, functional mobility, body mechanics/ergonomics, and home and community integration, to attain remaining goals.    Progress toward goals / Updated goals:    1. Pt will demonstrate at least 150* active shoulder FL at allow her to reach into kitchen brenda. 2. Pt will demonstrate 4/5 shoulder ER strength to allow her to complete dressing tasks without compensation. 3. Pt will demonstrate at least 130* active shoulder ABD to allow her to wash hair.   4. Pt will be independent with HEP at D/C for self management. 5. Patient will increase FOTO Functional Status score to 30 to decrease functional limitations.        PLAN    []  Upgrade activities as tolerated YES Continue plan of care   []  Discharge due to :    []  Other:      Therapist: Emmy Gamble PT, DPT    Date: 6/8/2022 Time: 12:12 PM     Future Appointments   Date Time Provider Kevin Sarabia   6/8/2022  2:45 PM Yaya Sis, PT [facility]   6/9/2022 11:45 AM Yaya Chisholm, PT [facility]   6/13/2022 11:00 AM Yaya Chisholm, PT [facility]   6/15/2022  2:45 PM Yaya Sis, PT [facility]   6/16/2022 10:45 AM SHO [facility] BS AMB   6/20/2022 11:00 AM Yaya Chisholm, PT [facility]   6/22/2022  2:45 PM Yaya Sis, PT [facility]   6/29/2022  2:45 PM Yaya Sis, PT [facility]

## 2022-06-09 ENCOUNTER — HOSPITAL ENCOUNTER (OUTPATIENT)
Dept: PHYSICAL THERAPY | Age: 57
Discharge: HOME | End: 2022-06-09
Payer: COMMERCIAL

## 2022-06-09 PROCEDURE — 97014 ELECTRIC STIMULATION THERAPY: CPT

## 2022-06-09 PROCEDURE — 97110 THERAPEUTIC EXERCISES: CPT

## 2022-06-09 NOTE — PROGRESS NOTES
PHYSICAL THERAPY - DAILY TREATMENT NOTE    Patient Name: Stephania Santos        Date: 2022  : 1965   YES Patient  Verified  Visit #:     Insurance: Payor: [facility] Means / Plan: [employer] HMO/CHOICE PLUS/POS / Product Type: HMO /      In time: 11:50 Out time: 12:40   Total Treatment Time: 50     Medicare/BCBS [place] Time Tracking (below)   Total Timed Codes (min):  50 1:1 Treatment Time:  40     TREATMENT AREA =  Right shoulder pain [M25.511]    SUBJECTIVE    Pain Level (on 0 to 10 scale):  2  / 10   Medication Changes/New allergies or changes in medical history, any new surgeries or procedures? NO    If yes, update Summary List   Subjective Functional Status/Changes:  []  No changes reported     \"It's always sore. \" no increase in pain symptoms since last visit. OBJECTIVE    40 min Therapeutic Exercise:  [x]  See flow sheet  PROM all planes   Rationale:      increase ROM and increase strength to improve the patients ability to perform ADL's with greater ease. Modalities Rationale:    decrease inflammation and decrease pain to improve patient's ability to complete functional tasks with less pain. 10 min [x] Estim, type/location: IFC to right shoulder post tx supine with wedge                                     []  att     [x]  unatt     []  w/US     [x]  w/ice    []  w/heat   [x] Skin assessment post-treatment (if applicable):    [x]  intact    []  redness- no adverse reaction     []redness - adverse reaction:       min Patient Education:  YES  Reviewed HEP   [x]  Progressed/Changed HEP based on:   Updated HEP     Other Objective/Functional Measures:    New exercises included today per flow sheet, different from yesterday's session.    Limited arm swing   Prone horizontal ABD with scapular setting, poor scapular control in this position  Rhythmic stabilization introduced to encourage co-contraction      Post Treatment Pain Level (on 0 to 10) scale:   0 / 10     ASSESSMENT    Assessment/Changes in Function:     Pt progressing as expected, updated HEP and provided red theraband to continue with exercises at home.      []  See Progress Note/Recertification   Patient will continue to benefit from skilled PT services to analyze, cue, progress, modify,, demonstrate, instruct, and address, movement patterns, therapeutic interventions, postural abnormalities, soft tissue restrictions, ROM, strength, functional mobility, body mechanics/ergonomics, and home and community integration, to attain remaining goals. Progress toward goals / Updated goals:    1. Pt will demonstrate at least 150* active shoulder FL at allow her to reach into kitchen cabinet. MET  2. Pt will demonstrate 4/5 shoulder ER strength to allow her to complete dressing tasks without compensation. 3. Pt will demonstrate at least 130* active shoulder ABD to allow her to wash hair. MET   4. Pt will be independent with HEP at D/C for self management. ONGOING  5. Patient will increase FOTO Functional Status score to 30 to decrease functional limitations.        PLAN    []  Upgrade activities as tolerated YES Continue plan of care   []  Discharge due to :    []  Other:      Therapist: Daisy Davila, PT, DPT    Date: 6/9/2022 Time: 7:58 AM     Future Appointments   Date Time Provider Kevin Sarabia   6/9/2022 11:45 AM Kodi [place], PT [facility]   6/13/2022 11:00 AM Kodi Acres, PT [facility]   6/15/2022  2:45 PM Kodi Acres, PT [facility]   6/16/2022 10:45 AM [facility] BS AMB   6/20/2022 11:00 AM Kodi Acres, PT [facility]   6/22/2022  2:45 PM Kodi Acres, PT [facility]   6/29/2022  2:45 PM Kodi Acres, PT [facility]

## 2022-06-15 ENCOUNTER — HOSPITAL ENCOUNTER (OUTPATIENT)
Dept: PHYSICAL THERAPY | Age: 57
Discharge: HOME | End: 2022-06-15
Payer: COMMERCIAL

## 2022-06-15 PROCEDURE — 97014 ELECTRIC STIMULATION THERAPY: CPT

## 2022-06-15 PROCEDURE — 97110 THERAPEUTIC EXERCISES: CPT

## 2022-06-15 PROCEDURE — 97530 THERAPEUTIC ACTIVITIES: CPT

## 2022-06-15 NOTE — PROGRESS NOTES
[facility] PHYSICAL THERAPY  [address] [facility] [address], [address] - Phone: [phone]  Fax: [phone]  Progress Note/CONTINUED PLAN OF CARE for PHYSICAL THERAPY          Patient Name: Jed Varela : 1965   Treatment/Medical Diagnosis: Right shoulder pain [M25.511]   Referral Source: Poonam Gross [facility]): 2022   Prior Hospitalization: See Medical History Provider #: [id number]   Medications: Verified on Patient Summary List   [facility]: 17 Missed Visits: 1     Goal/Measure of Progress Goal Met? 1. Pt will demonstrate at least 150* active shoulder FL at allow her to reach into kitchen cabinet. Status at last Eval: PROM 155* Current Status: 150* yes   2. Pt will demonstrate 4/5 shoulder ER strength to allow her to complete dressing tasks without compensation. Status at last Eval: Not tested Current Status: 4/5 p! yes   3. Pt will demonstrate at least 130* active shoulder ABD to allow her to wash hair. Status at last Eval: PROM 105* Current Status: 145* mild p! yes   4. Pt will be independent with HEP at D/C for self management. Status at last Eval: Established Current Status: Able yes     Key Functional Changes/Progress: Reassessment performed today. Pt reports a 65% improvement in symptoms. Pt has consistently reported soreness each visit, no pain reproduced in clinic. Recently began experiencing pain within the shoulder, she reports pain worse at night and reproduced with OH movements in evening and with computer typing activities. Progressing patient per post-op protocol. She demonstrate gains in AROM and strength all planes of motion, measurements noted below. IR > ER remains most limited, pain at end range. Pain levels at highest in last week: 5/10    AROM/MMT:  FL: 150*; 4-/5 p! With resistance  ABD: 150*; 4/5  ER at neutral = 50*; 4/5 p!  With resistance   IR = right PSIS; 4/5    Problem List: pain affecting function, decrease ROM, decrease strength, decrease ADL/ functional abilitiies, decrease activity tolerance and decrease flexibility/ joint mobility   Treatment Plan may include any combination of the following: Therapeutic exercise, Therapeutic activities, Neuromuscular re-education, Physical agent/modality, Manual therapy, Patient education, Self Care training and Functional mobility training  Patient Goal(s) has been updated and includes:      Goals for this certification period include and are to be achieved in   4  weeks:  1. Pt will be independent with HEP at D/C for self management. 2. Pt will demonstrate 165* active FL to allow her to reach into kitchen cabinets. 3. Pt will demonstrate 4+/5 R shoulder FL strength to allow her to tolerate lifting activities. 4. Patient will increase FOTO Functional Status score to 30 to decrease functional limitations. Frequency / Duration:   Patient to be seen   2   times per week for   4-6    weeks:    Assessments/Recommendations: patient would benefit from continuation of skilled PT services to address deficits noted above. If you have any questions/comments please contact us directly at [phone]. Thank you for allowing us to assist in the care of your patient. Therapist Signature: Gayle Montez PT, DPT Date: 2/29/3192   Certification Period:  Reporting Period: na  na Time: 10:22 AM   NOTE TO PHYSICIAN:  PLEASE COMPLETE THE ORDERS BELOW AND FAX TO   [facility] Physical Therapy: [phone].   If you are unable to process this request in 24 hours please contact our office: [phone].    ___ I have read the above report and request that my patient continue as recommended.   ___ I have read the above report and request that my patient continue therapy with the following changes/special instructions: ________________________________________________   ___ I have read the above report and request that my patient be discharged from therapy.      Physician Signature:        Date:       Time:

## 2022-06-15 NOTE — PROGRESS NOTES
PHYSICAL THERAPY - DAILY TREATMENT NOTE    Patient Name: Evens Kunz        Date: 6/15/2022  : 1965   YES Patient  Verified  Visit #:     Insurance: Payor: Merenahum Fernández / Plan: [employer] HMO/CHOICE PLUS/POS / Product Type: HMO /      In time: 2:40 Out time: 3:36   Total Treatment Time: 56     Medicare/BCBS [place] Time Tracking (below)   Total Timed Codes (min):  56 1:1 Treatment Time:  46     TREATMENT AREA =  Right shoulder pain [M25.511]    SUBJECTIVE    Pain Level (on 0 to 10 scale):  0  / 10   Medication Changes/New allergies or changes in medical history, any new surgeries or procedures? NO    If yes, update Summary List   Subjective Functional Status/Changes:  []  No changes reported     Pt reports she is still experiencing soreness in shoulder but started noticing more pain, worse at night and also occurred when reaching [place] recently. F/u with MD tomorrow        OBJECTIVE    36 min Therapeutic Exercise:  [x]  See flow sheet   Rationale:      increase ROM and increase strength to improve the patients ability to perform ADL's with greater ease. 10 min Therapeutic Activity: See flow sheet   Rationale:   increase mobility, endurance, and strength to improve patient's ability to complete functional tasks with greater ease. Modalities Rationale:    decrease inflammation and decrease pain to improve patient's ability to complete functional tasks with less pain.    10 min [x] Estim, type/location: R shoulder post tx supine with wedge                                    []  att     [x]  unatt     []  w/US     [x]  w/ice    []  w/heat   [x] Skin assessment post-treatment (if applicable):    [x]  intact    []  redness- no adverse reaction     []redness - adverse reaction:       min Patient Education:  YES  Reviewed HEP   []  Progressed/Changed HEP based on:   Cont with HEP     Other Objective/Functional Measures:    Refer to PN     Post Treatment Pain Level (on 0 to 10) scale:   0  / 10     ASSESSMENT    Assessment/Changes in Function:     Refer to PN     []  See Progress Note/Recertification   Patient will continue to benefit from skilled PT services to analyze, cue, progress, modify,, demonstrate, instruct, and address, movement patterns, therapeutic interventions, postural abnormalities, soft tissue restrictions, ROM, strength, functional mobility, body mechanics/ergonomics, and home and community integration, to attain remaining goals. Progress toward goals / Updated goals:    1. Pt will demonstrate at least 150* active shoulder FL at allow her to reach into kitchen cabinet. MET  2. Pt will demonstrate 4/5 shoulder ER strength to allow her to complete dressing tasks without compensation. 3. Pt will demonstrate at least 130* active shoulder ABD to allow her to wash hair. MET   4. Pt will be independent with HEP at D/C for self management. ONGOING  5. Patient will increase FOTO Functional Status score to 30 to decrease functional limitations.        PLAN    []  Upgrade activities as tolerated YES Continue plan of care   []  Discharge due to :    []  Other:      Therapist: Mathieu Terrazas PT, DPT    Date: 6/15/2022 Time: 10:21 AM     Future Appointments   Date Time Provider Kevin Sarabia   6/15/2022  2:45 PM Vera Holland, PT [facility]   6/16/2022 10:45 AM SHO [facility] BS AMB   6/20/2022 11:00 AM Vera Holland, PT [facility]   6/22/2022  2:45 PM Vera Holland, PT [facility]   6/29/2022  2:45 PM Vera [place], PT [facility]

## 2022-06-16 ENCOUNTER — DOCUMENTATION ONLY (OUTPATIENT)
Dept: ORTHOPEDIC SURGERY | Age: 57
End: 2022-06-16

## 2022-06-16 ENCOUNTER — OFFICE VISIT (OUTPATIENT)
Dept: ORTHOPEDIC SURGERY | Age: 57
End: 2022-06-16
Payer: COMMERCIAL

## 2022-06-16 VITALS — BODY MASS INDEX: 45.05 KG/M2 | TEMPERATURE: 97.8 F | WEIGHT: 244.8 LBS | HEIGHT: 62 IN

## 2022-06-16 DIAGNOSIS — M75.121 COMPLETE TEAR OF RIGHT ROTATOR CUFF, UNSPECIFIED WHETHER TRAUMATIC: Primary | ICD-10-CM

## 2022-06-16 PROCEDURE — 99024 POSTOP FOLLOW-UP VISIT: CPT | Performed by: PHYSICIAN ASSISTANT

## 2022-06-16 RX ORDER — METHYLPREDNISOLONE 4 MG/1
TABLET ORAL
Qty: 1 DOSE PACK | Refills: 0 | Status: SHIPPED | OUTPATIENT
Start: 2022-06-16 | End: 2022-08-15 | Stop reason: ALTCHOICE

## 2022-06-16 NOTE — PROGRESS NOTES
Nakul Tobar  1965     HISTORY OF PRESENT ILLNESS  Nakul Tobar is a 55 y.o. female who presents today for evaluation s/p Right shoulder arthroscopic 4cm rotator cuff repair on 4/8/22. Patient has been going to PT. Describes pain as a 2/10. She states overall she is improving but she is having a significant increase in her night discomfort. She also feels like her shoulder is swollen. Patient denies any fever, chills, chest pain, shortness of breath or calf pain. The remainder of the review of systems is negative. There are no new illness or injuries to report since last seen in the office. No changes in medications, allergies, social or family history. PHYSICAL EXAM:   Visit Vitals  Temp 97.8 °F (36.6 °C) (Temporal)   Ht 5' 2\" (1.575 m)   Wt 244 lb 12.8 oz (111 kg)   BMI 44.77 kg/m²      The patient is a well-developed, well-nourished female in no acute distress. The patient is alert and oriented times three. The patient appears to be well groomed. Mood and affect are normal.  ORTHOPEDIC EXAM of right shoulder:  Inspection: swelling present,  Bruising not present  Incisions well healed  Passive glenohumeral abduction 0-90 degrees  Stability: Stable  Strength: 4/5  2+ distal pulses    IMPRESSION:  S/P Right shoulder arthroscopic 4cm rotator cuff repair    PLAN:   1. Patient with slight increase in night pain consistent with synovitis of her right shoulder. We will place her on a Medrol Dosepak today. She will continue with physical therapy. We will continue to limit her activity secondary to discomfort.   She will continue on no duty status for the next 4 weeks until reevaluated goal to return to work shortly after this visit    RTC 4 weeks      LARISSA Scanlon [facility]

## 2022-06-20 ENCOUNTER — APPOINTMENT (OUTPATIENT)
Dept: PHYSICAL THERAPY | Age: 57
End: 2022-06-20
Payer: COMMERCIAL

## 2022-06-21 ENCOUNTER — TELEPHONE (OUTPATIENT)
Dept: ORTHOPEDIC SURGERY | Age: 57
End: 2022-06-21

## 2022-06-21 DIAGNOSIS — M75.101 TEAR OF RIGHT ROTATOR CUFF, UNSPECIFIED TEAR EXTENT, UNSPECIFIED WHETHER TRAUMATIC: Primary | ICD-10-CM

## 2022-06-21 NOTE — TELEPHONE ENCOUNTER
Patient states there has been no changes with her right shoulder, and is asking if an order for a MRI can be put in for her. Patient can be reached at [phone].

## 2022-06-22 ENCOUNTER — TELEPHONE (OUTPATIENT)
Dept: ORTHOPEDIC SURGERY | Age: 57
End: 2022-06-22

## 2022-06-22 ENCOUNTER — HOSPITAL ENCOUNTER (OUTPATIENT)
Dept: PHYSICAL THERAPY | Age: 57
End: 2022-06-22
Payer: COMMERCIAL

## 2022-06-22 DIAGNOSIS — M75.101 TEAR OF RIGHT ROTATOR CUFF, UNSPECIFIED TEAR EXTENT, UNSPECIFIED WHETHER TRAUMATIC: Primary | ICD-10-CM

## 2022-06-22 NOTE — TELEPHONE ENCOUNTER
Damaris Martini from central scheduling called to request we update patients order to be \"with and without contract\" because we're ordering an arthrogram.  She requests we let patient know once updated so she can call them back to schedule, patients # [phone].

## 2022-06-24 ENCOUNTER — TELEPHONE (OUTPATIENT)
Dept: ORTHOPEDIC SURGERY | Age: 57
End: 2022-06-24

## 2022-06-24 NOTE — TELEPHONE ENCOUNTER
Patient had some questions about her [facility] paperwork. Patient expressed understanding and is aware that paperwork takes up to 10-14 business days.

## 2022-06-24 NOTE — TELEPHONE ENCOUNTER
Patient called and is asking to speak to Dr. Vianney Mccoy nurse. Patient is asking that the most current notes and forms be sent to [facility]. The [id number] and Leave statement was received at the [facility] location on 06/16/2022. Patient would like a call back at  [facility]. [phone].

## 2022-06-27 ENCOUNTER — DOCUMENTATION ONLY (OUTPATIENT)
Dept: ORTHOPEDIC SURGERY | Age: 57
End: 2022-06-27

## 2022-06-28 ENCOUNTER — DOCUMENTATION ONLY (OUTPATIENT)
Dept: ORTHOPEDIC SURGERY | Age: 57
End: 2022-06-28

## 2022-06-28 NOTE — PROGRESS NOTES
Disability form completed and faxed.  Copy sent to scanning and left for patient  at [facility]

## 2022-06-29 ENCOUNTER — APPOINTMENT (OUTPATIENT)
Dept: PHYSICAL THERAPY | Age: 57
End: 2022-06-29
Payer: COMMERCIAL

## 2022-07-01 ENCOUNTER — HOSPITAL ENCOUNTER (OUTPATIENT)
Dept: MRI IMAGING | Age: 57
Discharge: HOME | End: 2022-07-01
Attending: PHYSICIAN ASSISTANT
Payer: COMMERCIAL

## 2022-07-01 ENCOUNTER — HOSPITAL ENCOUNTER (OUTPATIENT)
Dept: GENERAL RADIOLOGY | Age: 57
Discharge: HOME | End: 2022-07-01
Attending: PHYSICIAN ASSISTANT
Payer: COMMERCIAL

## 2022-07-01 DIAGNOSIS — M75.101 TEAR OF RIGHT ROTATOR CUFF, UNSPECIFIED TEAR EXTENT, UNSPECIFIED WHETHER TRAUMATIC: ICD-10-CM

## 2022-07-01 PROCEDURE — 73222 MRI JOINT UPR EXTREM W/DYE: CPT

## 2022-07-01 PROCEDURE — A9576 INJ PROHANCE MULTIPACK: HCPCS | Performed by: PHYSICIAN ASSISTANT

## 2022-07-01 PROCEDURE — 74011250636 HC RX REV CODE- 250/636: Performed by: PHYSICIAN ASSISTANT

## 2022-07-01 PROCEDURE — 74011000250 HC RX REV CODE- 250: Performed by: PHYSICIAN ASSISTANT

## 2022-07-01 PROCEDURE — 74011000636 HC RX REV CODE- 636: Performed by: PHYSICIAN ASSISTANT

## 2022-07-01 PROCEDURE — 77002 NEEDLE LOCALIZATION BY XRAY: CPT

## 2022-07-01 RX ORDER — SODIUM CHLORIDE 9 MG/ML
10 INJECTION INTRAMUSCULAR; INTRAVENOUS; SUBCUTANEOUS
Status: COMPLETED | OUTPATIENT
Start: 2022-07-01 | End: 2022-07-01

## 2022-07-01 RX ORDER — LIDOCAINE HYDROCHLORIDE 10 MG/ML
6 INJECTION, SOLUTION EPIDURAL; INFILTRATION; INTRACAUDAL; PERINEURAL
Status: COMPLETED | OUTPATIENT
Start: 2022-07-01 | End: 2022-07-01

## 2022-07-01 RX ADMIN — IOPAMIDOL 5 ML: 408 INJECTION, SOLUTION INTRATHECAL at 09:30

## 2022-07-01 RX ADMIN — LIDOCAINE HYDROCHLORIDE 6 ML: 10 INJECTION, SOLUTION EPIDURAL; INFILTRATION; INTRACAUDAL; PERINEURAL at 09:30

## 2022-07-01 RX ADMIN — SODIUM CHLORIDE 5 ML: 9 INJECTION INTRAMUSCULAR; INTRAVENOUS; SUBCUTANEOUS at 09:30

## 2022-07-01 RX ADMIN — GADOTERIDOL 0.15 ML: 279.3 INJECTION, SOLUTION INTRAVENOUS at 09:30

## 2022-07-06 ENCOUNTER — OFFICE VISIT (OUTPATIENT)
Dept: ORTHOPEDIC SURGERY | Age: 57
End: 2022-07-06
Payer: COMMERCIAL

## 2022-07-06 VITALS — BODY MASS INDEX: 44.09 KG/M2 | HEIGHT: 62 IN | WEIGHT: 239.6 LBS | TEMPERATURE: 97.3 F

## 2022-07-06 DIAGNOSIS — M75.101 TEAR OF RIGHT ROTATOR CUFF, UNSPECIFIED TEAR EXTENT, UNSPECIFIED WHETHER TRAUMATIC: Primary | ICD-10-CM

## 2022-07-06 PROCEDURE — 99024 POSTOP FOLLOW-UP VISIT: CPT | Performed by: PHYSICIAN ASSISTANT

## 2022-07-06 RX ORDER — DICLOFENAC SODIUM 10 MG/G
4 GEL TOPICAL 4 TIMES DAILY
Qty: 500 G | Refills: 2 | Status: SHIPPED | OUTPATIENT
Start: 2022-07-06

## 2022-07-06 NOTE — PROGRESS NOTES
Devin Corona  1965     HISTORY OF PRESENT ILLNESS  Devin Corona is a 55 y.o. female who presents today for evaluation s/p Right shoulder arthroscopic 4cm rotator cuff repair on 4/8/22. Patient has been going to PT. Describes pain as a 2/10. She actually feels a little better today. Patient denies any fever, chills, chest pain, shortness of breath or calf pain. The remainder of the review of systems is negative. There are no new illness or injuries to report since last seen in the office. No changes in medications, allergies, social or family history. PHYSICAL EXAM:   Visit Vitals  Temp 97.3 °F (36.3 °C) (Temporal)   Ht 5' 2\" (1.575 m)   Wt 239 lb 9.6 oz (108.7 kg)   BMI 43.82 kg/m²      The patient is a well-developed, well-nourished female in no acute distress. The patient is alert and oriented times three. The patient appears to be well groomed. Mood and affect are normal.  ORTHOPEDIC EXAM of right shoulder:  Inspection: swelling present,  Bruising not present  Incisions well healed  Passive glenohumeral abduction 0-90 degrees  Stability: Stable  Strength: 4/5  2+ distal pulses    IMAGING: MRI arthrogram of right shoulder read and reviewed by myself reveals: 1. Contrast extravasation from glenohumeral joint injection into the subacromial  space with no focal gap, rupture or retraction of the repaired rotator cuff  tendons. Likely from perforation. 2. Contrast mixture extends into the post operative [facility] joint as well. 3. Infraspinatus and supraspinatus muscular atrophy. IMPRESSION:  S/P Right shoulder arthroscopic 4cm rotator cuff repair    PLAN:   1. Patient with no evidence of retear of rotator cuff tendon  2.  Will cont with PT  Remain on no duty    RTC 4 weeks      [facility]ist

## 2022-07-06 NOTE — LETTER
NOTIFICATION RETURN TO WORK / SCHOOL    7/6/2022 11:01 AM    Ms. Karl Laird  [address] [address]      To Whom It May Concern:    Karl Laird is currently under the care of [address] [place] [address] Cristian Shi. She will remain on no duty status x 4 weeks    If there are questions or concerns please have the patient contact our office.         Sincerely,      SHO Guzman

## 2022-07-07 ENCOUNTER — HOSPITAL ENCOUNTER (OUTPATIENT)
Dept: PHYSICAL THERAPY | Age: 57
Discharge: HOME | End: 2022-07-07
Payer: COMMERCIAL

## 2022-07-07 PROCEDURE — 97014 ELECTRIC STIMULATION THERAPY: CPT

## 2022-07-07 PROCEDURE — 97110 THERAPEUTIC EXERCISES: CPT

## 2022-07-07 PROCEDURE — 97140 MANUAL THERAPY 1/> REGIONS: CPT

## 2022-07-07 NOTE — PROGRESS NOTES
PHYSICAL THERAPY - DAILY TREATMENT NOTE    Patient Name: David Silvestre        Date: 2022  : 1965   YES Patient  Verified  Visit #:      of     Insurance: Payor: Alpa Vinsonlibby / Plan: [employer] HMO/CHOICE PLUS/POS / Product Type: HMO /      In time: 2:02 Out time: 3:07   Total Treatment Time: 65     Medicare/BCBS [place] Time Tracking (below)   Total Timed Codes (min):  55 1:1 Treatment Time:  55     TREATMENT AREA =  Right shoulder pain [M25.511]    SUBJECTIVE    Pain Level (on 0 to 10 scale):  3  / 10   Medication Changes/New allergies or changes in medical history, any new surgeries or procedures? NO    If yes, update Summary List   Subjective Functional Status/Changes:  []  No changes reported     Pt reports shoulder continues to hurt, \"it hurts to the touch. \"       OBJECTIVE    35 min Therapeutic Exercise:  [x]  See flow sheet   Rationale:      increase ROM and increase strength to improve the patients ability to perform ADL's with greater ease. 10 min Manual Therapy: See flow sheet  Astym R shoulder girdle    Rationale:      decrease pain, increase ROM and increase tissue extensibility to improve patient's ability to perform functional mobility with less compensation and pain. The manual therapy interventions were performed at a separate and distinct time from the therapeutic activities interventions    Modalities Rationale:    decrease inflammation and decrease pain to improve patient's ability to complete functional tasks with less pain.    10 min [x] Estim, type/location: IFC R shoulder post tx seated                                      []  att     [x]  unatt     []  w/US     [x]  w/ice    []  w/heat   [x] Skin assessment post-treatment (if applicable):    [x]  intact    []  redness- no adverse reaction     []redness - adverse reaction:       min Patient Education:  YES  Reviewed HEP   []  Progressed/Changed HEP based on:   Cont with HEP     Other Objective/Functional Measures:    Cont with outlined TE program, progressed to 1 lb for sidelying ER and also supine punches     Introduced wall push ups, good tolerance to weight bearing    Included Astym to entire R shoulder girdle to address hypersensitivity and effusion      Post Treatment Pain Level (on 0 to 10) scale:   1  / 10     ASSESSMENT    Assessment/Changes in Function:     No pain reproduced, ms fatigue observed with strengthening. []  See Progress Note/Recertification   Patient will continue to benefit from skilled PT services to analyze, cue, progress, modify,, demonstrate, instruct, and address, movement patterns, therapeutic interventions, postural abnormalities, soft tissue restrictions, ROM, strength, functional mobility, body mechanics/ergonomics, and home and community integration, to attain remaining goals. Progress toward goals / Updated goals:    · Goals for this certification period include and are to be achieved in   4  weeks:  1.  Pt will be independent with HEP at D/C for self management. 2. Pt will demonstrate 165* active FL to allow her to reach into kitchen cabinets. 3. Pt will demonstrate 4+/5 R shoulder FL strength to allow her to tolerate lifting activities.    4. Patient will increase FOTO Functional Status score to 30 to decrease functional limitations.                        PLAN    []  Upgrade activities as tolerated YES Continue plan of care   []  Discharge due to :    []  Other:      Therapist: Salomon Powers PT, DPT    Date: 7/7/2022 Time: 8:32 AM     Future Appointments   Date Time Provider Kevin Sarabia   7/7/2022  2:00 PM Josesito Patel PT [facility]   7/12/2022  8:45 AM Josesito Patel PT [facility]   7/14/2022  8:45 AM AMOR [facility]val[facility]HELLE [facility]   8/3/2022  2:00 PM Kathy Helton PA [facility]

## 2022-07-12 ENCOUNTER — HOSPITAL ENCOUNTER (OUTPATIENT)
Dept: PHYSICAL THERAPY | Age: 57
Discharge: HOME | End: 2022-07-12
Payer: COMMERCIAL

## 2022-07-12 PROCEDURE — 97110 THERAPEUTIC EXERCISES: CPT

## 2022-07-12 PROCEDURE — 97140 MANUAL THERAPY 1/> REGIONS: CPT

## 2022-07-12 NOTE — PROGRESS NOTES
PHYSICAL THERAPY - DAILY TREATMENT NOTE    Patient Name: Oseas Ahmadi        Date: 2022  : 1965   YES Patient  Verified  Visit #:     Insurance: Payor: Whit Zoran / Plan: [employer] HMO/CHOICE PLUS/POS / Product Type: HMO /      In time: 8:45 Out time: 9:30   Total Treatment Time: 45     Medicare/BCBS [place] Time Tracking (below)   Total Timed Codes (min):  45 1:1 Treatment Time:  45     TREATMENT AREA =  Right shoulder pain [M25.511]    SUBJECTIVE    Pain Level (on 0 to 10 scale):  0  / 10   Medication Changes/New allergies or changes in medical history, any new surgeries or procedures? NO    If yes, update Summary List   Subjective Functional Status/Changes:  []  No changes reported     Pt reports soreness always occurs in the morning, \"I sleep on my right side. \" She reports feeling good after last visit, feels like she can tolerate bra strap on shoulder better. OBJECTIVE    30 min Therapeutic Exercise:  [x]  See flow sheet   Rationale:      increase ROM and increase strength to improve the patients ability to perform ADL's with greater ease. 15 min Manual Therapy: See flow sheet  astym to entire  Shoulder girdle    Rationale:      decrease pain, increase ROM, increase tissue extensibility and increase postural awareness to improve patient's ability to perform functional mobility with less compensation and pain. The manual therapy interventions were performed at a separate and distinct time from the therapeutic activities interventions       min Patient Education:  YES  Reviewed HEP   []  Progressed/Changed HEP based on:   Cont with HEP     Other Objective/Functional Measures:    Progression of reps to increase shoulder strength     Post Treatment Pain Level (on 0 to 10) scale:   1  / 10     ASSESSMENT    Assessment/Changes in Function:     Session tolerated well, RC strength deficits remain.  Minimal pain reported      []  See Progress Note/Recertification   Patient will continue to benefit from skilled PT services to analyze, cue, progress, modify,, demonstrate, instruct, and address, movement patterns, therapeutic interventions, postural abnormalities, soft tissue restrictions, ROM, strength, functional mobility, body mechanics/ergonomics, and home and community integration, to attain remaining goals. Progress toward goals / Updated goals:    · Goals for this certification period include and are to be achieved in   4  weeks:  1.  Pt will be independent with HEP at D/C for self management. 2. Pt will demonstrate 165* active FL to allow her to reach into kitchen cabinets.   3. Pt will demonstrate 4+/5 R shoulder FL strength to allow her to tolerate lifting activities.   4. Patient will increase FOTO Functional Status score to 30 to decrease functional limitations.           PLAN    []  Upgrade activities as tolerated YES Continue plan of care   []  Discharge due to :    []  Other:      Therapist: Andrés Melton PT, DPT    Date: 7/12/2022 Time: 7:51 AM     Future Appointments   Date Time Provider Kevin Sarabia   7/12/2022  8:45 AM Carlita Tang PT [facility] [address]   7/14/2022  8:45 AM Carlita Tang PT [facility] [address]   8/3/2022  2:00 PM Meño Helton PA [facility] AM[facility]

## 2022-07-14 ENCOUNTER — HOSPITAL ENCOUNTER (OUTPATIENT)
Dept: PHYSICAL THERAPY | Age: 57
Discharge: HOME | End: 2022-07-14
Payer: COMMERCIAL

## 2022-07-14 PROCEDURE — 97140 MANUAL THERAPY 1/> REGIONS: CPT

## 2022-07-14 PROCEDURE — 97110 THERAPEUTIC EXERCISES: CPT

## 2022-07-14 NOTE — PROGRESS NOTES
PHYSICAL THERAPY - DAILY TREATMENT NOTE    Patient Name: Parminder Kwok        Date: 2022  : 1965   YES Patient  Verified  Visit #:     Insurance: Payor: Raegan Aleger / Plan: [employer] HMO/CHOICE PLUS/POS / Product Type: HMO /      In time: 8:45 Out time: 9:30   Total Treatment Time: 45     Medicare/BCBS [place] Time Tracking (below)   Total Timed Codes (min):  45 1:1 Treatment Time:  45     TREATMENT AREA =  Right shoulder pain [M25.511]    SUBJECTIVE    Pain Level (on 0 to 10 scale):  2  / 10   Medication Changes/New allergies or changes in medical history, any new surgeries or procedures? NO    If yes, update Summary List   Subjective Functional Status/Changes:  []  No changes reported     Pt reports increased pain and soreness in shoulder following her last treatment session. She states using topical cream to alleviate pain symptoms \"it only lasts for 30 minutes though. \"    She reports her RTW date is set for August 3. OBJECTIVE    35 min Therapeutic Exercise:  [x]  See flow sheet   Rationale:      increase ROM and increase strength to improve the patients ability to perform ADL's with greater ease. 10 min Manual Therapy: See flow sheet  astym to entire R shoulder girdle    Rationale:      decrease pain, increase ROM and increase tissue extensibility to improve patient's ability to perform functional mobility with less compensation and pain.    The manual therapy interventions were performed at a separate and distinct time from the therapeutic activities interventions     min Patient Education:  YES  Reviewed HEP   []  Progressed/Changed HEP based on:   Cont with HEP     Other Objective/Functional Measures:    Did not progress exercises today     Post Treatment Pain Level (on 0 to 10) scale:   0  / 10     ASSESSMENT    Assessment/Changes in Function:     Session tolerated well with reduction of pain/stiffness in shoulder post tx.      []  See Progress Note/Recertification   Patient will continue to benefit from skilled PT services to analyze, cue, progress, modify,, demonstrate, instruct, and address, movement patterns, therapeutic interventions, postural abnormalities, soft tissue restrictions, ROM, strength, functional mobility, body mechanics/ergonomics, and home and community integration, to attain remaining goals. Progress toward goals / Updated goals:    · Goals for this certification period include and are to be achieved in   4  weeks:  1.  Pt will be independent with HEP at D/C for self management.   2. Pt will demonstrate 165* active FL to allow her to reach into kitchen cabinets.   3. Pt will demonstrate 4+/5 R shoulder FL strength to allow her to tolerate lifting activities.   4. Patient will increase FOTO Functional Status score to 30 to decrease functional limitations       PLAN    []  Upgrade activities as tolerated YES Continue plan of care   []  Discharge due to :    []  Other:      Therapist: Earl West PT, DPT    Date: 7/14/2022 Time: 8:30 AM     Future Appointments   Date Time Provider Kevin Sarabia   7/14/2022  8:45 AM Mercy Kinney PT [facility]   8/3/2022  2:00 PM Mich Helton PA [facility]

## 2022-07-18 ENCOUNTER — HOSPITAL ENCOUNTER (OUTPATIENT)
Dept: PHYSICAL THERAPY | Age: 57
Discharge: HOME | End: 2022-07-18
Payer: COMMERCIAL

## 2022-07-18 PROCEDURE — 97140 MANUAL THERAPY 1/> REGIONS: CPT

## 2022-07-18 PROCEDURE — 97110 THERAPEUTIC EXERCISES: CPT

## 2022-07-18 NOTE — PROGRESS NOTES
PHYSICAL THERAPY - DAILY TREATMENT NOTE    Patient Name: Karin Bar        Date: 2022  : 1965   YES Patient  Verified  Visit #:     Insurance: Payor: Nabeel  / Plan: [employer] HMO/CHOICE PLUS/POS / Product Type: HMO /      In time: 8:45 Out time: 9:43   Total Treatment Time: 48     Medicare/BCBS [place] Time Tracking (below)   Total Timed Codes (min):  48 1:1 Treatment Time:  48     TREATMENT AREA =  Right shoulder pain [M25.511]    SUBJECTIVE    Pain Level (on 0 to 10 scale):  3  / 10   Medication Changes/New allergies or changes in medical history, any new surgeries or procedures? NO    If yes, update Summary List   Subjective Functional Status/Changes:  []  No changes reported     Pt reports feeling fine after last visit, Saturday and  she reports having \"sharp pain\" throughout R shoulder. She reports feeling pain primarily along the posterior shoulder girdle. Pt reports she made an appointment with her MD tomorrow due to ongoing shoulder pain, she wants to look into getting a cortisone injection to shoulder. .          OBJECTIVE    38 min Therapeutic Exercise:  [x]  See flow sheet   Rationale:      increase ROM and increase strength to improve the patients ability to perform ADL's with greater ease.        10 min Manual Therapy: See flow sheet  Astym to entire R shoulder girdle    Rationale:      decrease pain, increase ROM, increase tissue extensibility and increase postural awareness to improve patient's ability to perform functional mobility with less compensation and pain.    The manual therapy interventions were performed at a separate and distinct time from the therapeutic activities interventions       min Patient Education:  YES  Reviewed HEP   []  Progressed/Changed HEP based on:   Cont with HEP     Other Objective/Functional Measures:    Close to full range AROM, most limited in behind the back IR     Post Treatment Pain Level (on 0 to 10) scale:   3  / 10     ASSESSMENT    Assessment/Changes in Function:     Pt tolerated progression of exercises today, steadily working to improve OH tolerance and strengthening of RC     []  See Progress Note/Recertification   Patient will continue to benefit from skilled PT services to analyze, cue, progress, modify,, demonstrate, instruct, and address, movement patterns, therapeutic interventions, postural abnormalities, soft tissue restrictions, ROM, strength, functional mobility, body mechanics/ergonomics, and home and community integration, to attain remaining goals. Progress toward goals / Updated goals:    · Goals for this certification period include and are to be achieved in   4  weeks:  1.  Pt will be independent with HEP at D/C for self management.   2. Pt will demonstrate 165* active FL to allow her to reach into kitchen cabinets.   3. Pt will demonstrate 4+/5 R shoulder FL strength to allow her to tolerate lifting activities.   4. Patient will increase FOTO Functional Status score to 30 to decrease functional limitations       PLAN    []  Upgrade activities as tolerated YES Continue plan of care   []  Discharge due to :    []  Other:      Therapist: Nicol Antonio PT, DPT    Date: 7/18/2022 Time: 8:03 AM     Future Appointments   Date Time Provider Kevin Sarabia   7/18/2022  8:45 AM Cheo Cassidy, PT [facility]   7/19/2022 11:15 AM SHO Rosales [facility] AM[facility]   7/21/2022  8:45 AM Cheo Cassidy PT [facility]   8/1/2022 11:00 AM Cheo Cassidy, PT [facility]   8/5/2022 11:45 AM Cheo Cassidy PT [facility]   8/8/2022 11:00 AM hCeo Cassidy, PT [facility]   8/10/2022 11:00 AM Cheo Cassidy PT [facility]   8/15/2022 11:00 AM Cheo Cassidy, PT [facility]   8/18/2022  8:45 AM Cheo Cassidy, PT [facility]   8/22/2022 11:00 AM Cheo Cassidy, PT [facility]   8/25/2022  8:45 AM Cheo Cassidy, PT [facility]   8/29/2022 11:00 AM Cheo Cassidy, PT [facility]   9/1/2022 8:45 AM Josesito Patel, PT [facility]

## 2022-07-21 ENCOUNTER — HOSPITAL ENCOUNTER (OUTPATIENT)
Dept: PHYSICAL THERAPY | Age: 57
Discharge: HOME | End: 2022-07-21
Payer: COMMERCIAL

## 2022-07-21 PROCEDURE — 97110 THERAPEUTIC EXERCISES: CPT

## 2022-07-21 PROCEDURE — 97032 APPL MODALITY 1+ESTIM EA 15: CPT

## 2022-07-21 NOTE — PROGRESS NOTES
PHYSICAL THERAPY - DAILY TREATMENT NOTE    Patient Name: Kathy Alonzo        Date: 2022  : 1965   YES Patient  Verified  Visit #:     Insurance: Payor: Jo Punt / Plan: [employer] HMO/CHOICE PLUS/POS / Product Type: HMO /      In time: 8:50 Out time: 9:35   Total Treatment Time: 45     Medicare/BCBS [place] Time Tracking (below)   Total Timed Codes (min):  35 1:1 Treatment Time:  35     TREATMENT AREA =  Right shoulder pain [M25.511]    SUBJECTIVE    Pain Level (on 0 to 10 scale):  4  / 10   Medication Changes/New allergies or changes in medical history, any new surgeries or procedures? NO    If yes, update Summary List   Subjective Functional Status/Changes:  []  No changes reported     Pt reports increased discomfort in right shoulder following visit. She states her father  this week. OBJECTIVE    35 min Therapeutic Exercise:  [x]  See flow sheet   Rationale:      increase ROM and increase strength to improve the patients ability to perform ADL's with greater ease. Modalities Rationale:    decrease inflammation and decrease pain to improve patient's ability to complete functional tasks with less pain. 10 min [x] Estim, type/location: IFC right shoulder post tx supine with wedge                                     []  att     [x]  unatt     []  w/US     [x]  w/ice    []  w/heat   [x] Skin assessment post-treatment (if applicable):    [x]  intact    []  redness- no adverse reaction     []redness - adverse reaction:       min Patient Education:  YES  Reviewed HEP   []  Progressed/Changed HEP based on:   Cont with HEP     Other Objective/Functional Measures:    Cont with outlined TE program.      Post Treatment Pain Level (on 0 to 10) scale:   3  / 10     ASSESSMENT    Assessment/Changes in Function:     Pt challenged with progression of reps, ms fatigue noted. Continue working to progress with strength and stability.       []  See Progress Note/Recertification   Patient will continue to benefit from skilled PT services to analyze, cue, progress, modify,, demonstrate, instruct, and address, movement patterns, therapeutic interventions, postural abnormalities, soft tissue restrictions, ROM, strength, functional mobility, body mechanics/ergonomics, and home and community integration, to attain remaining goals. Progress toward goals / Updated goals:    Goals for this certification period include and are to be achieved in   4  weeks:  1. Pt will be independent with HEP at D/C for self management. 2. Pt will demonstrate 165* active FL to allow her to reach into kitchen cabinets. 3. Pt will demonstrate 4+/5 R shoulder FL strength to allow her to tolerate lifting activities.    4. Patient will increase FOTO Functional Status score to 30 to decrease functional limitations        PLAN    []  Upgrade activities as tolerated YES Continue plan of care   []  Discharge due to :    []  Other:      Therapist: Naomi Cohn PT, DPT    Date: 7/21/2022 Time: 8:52 AM     Future Appointments   Date Time Provider Kevin Sarabia   8/1/2022 11:00 AM Jerilyn Spurling, PT [facility]   8/5/2022 11:45 AM Jerilyn Spurling, PT [facility]   8/8/2022 11:00 AM Jerilyn Spurling, PT [facility]   8/10/2022 11:00 AM Jerilyn Spurling, PT [facility]   8/15/2022 11:00 AM Jerilyn Spurling, PT [facility]   8/18/2022  8:45 AM Jerilyn Spurling, PT [facility]   8/22/2022 11:00 AM Jerilyn Spurling, PT [facility]   8/25/2022  8:45 AM Jerilyn Spurling, PT [facility]   8/29/2022 11:00 AM Jerilyn Spurling, PT [facility]   9/1/2022  8:45 AM Jerilyn Spurling, PT [facility]

## 2022-07-25 ENCOUNTER — HOSPITAL ENCOUNTER (OUTPATIENT)
Dept: PHYSICAL THERAPY | Age: 57
Discharge: HOME | End: 2022-07-25
Payer: COMMERCIAL

## 2022-07-25 PROCEDURE — 97110 THERAPEUTIC EXERCISES: CPT

## 2022-07-25 PROCEDURE — 97014 ELECTRIC STIMULATION THERAPY: CPT

## 2022-07-25 PROCEDURE — 97530 THERAPEUTIC ACTIVITIES: CPT

## 2022-07-25 NOTE — PROGRESS NOTES
[facility] PHYSICAL THERAPY  [address] [facility] [address], [address] - Phone: [phone]  Fax: [phone]  Progress Note/CONTINUED R Graeme Zayas 20 for PHYSICAL THERAPY          Patient Name: Deya Richard : 1965   Treatment/Medical Diagnosis: Right shoulder pain [M25.511]   Referral Source: Aimee Henning Start of Care [facility]): 22   Prior Hospitalization: See Medical History Provider #: [id number]   Medications: Verified on Patient Summary List   Visits from [facility]: 23 Missed Visits: 1     Goal/Measure of Progress Goal Met? 1. Pt will be independent with HEP at D/C for self management. Status at last Eval: Unable Current Status: Ongoing Progressing   2. Pt will demonstrate 165* active FL to allow her to reach into kitchen cabinets. Status at last Eval: 150* Current Status: 155* no   3. Pt will demonstrate 4+/5 R shoulder FL strength to allow her to tolerate lifting activities. Status at last Eval: 4-/5 p! Current Status: 4/5 p! no     AROM/MMT   FL: 155*, 4/5 p! ABD: 150*, 4/5  ER at 90*: 50*, 5-/5  IR behind the back: R PSIS    Key Functional Changes/Progress: Reassessment performed today. Pt reports a 70% improvement in symptoms since surgery. Pt reports activities involving reaching [address] and reaching behind back remain most limited. Pt demonstrates strength limitations in OH planes with mild pain noted with manual resistance, specifically to shoulder flexors. Pt is eager to return to work in August. 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 Pt able to demonstrate good mechanics for CPR using BOSU to replicate activity; she was able to demonstrate CPR x1 minute today with only c/o ms fatigue. Updated HEP provided today. Pt is able to demonstrate independence with learned exercises. Thank you for allowing me to assist with this case.      Problem List: pain affecting function, decrease ROM, decrease strength, decrease ADL/ functional abilitiies, decrease activity tolerance, and decrease flexibility/ joint mobility   Treatment Plan may include any combination of the following: Therapeutic exercise, Therapeutic activities, Neuromuscular re-education, Physical agent/modality, Manual therapy, Patient education, Self Care training, and Functional mobility training  Patient Goal(s) has been updated and includes:     Goals for this certification period include and are to be achieved in   4  weeks:    1. Pt will be independent with HEP at D/C for self management. 2. Pt will demonstrate 160* active FL to allow her to reach into kitchen cabinets. 3. Pt will demonstrate 4+/5 R shoulder FL strength to allow her to tolerate lifting activities. 4. Pt will demonstrate improved IR behind the back to at least L3 to improve ability to wash her back. Frequency / Duration:   Patient to be seen   1-2   times per week for   4    weeks:    Assessments/Recommendations: Patient would benefit from continuation of PT with emphasis on HEP for preparation for DC. If you have any questions/comments please contact us directly at [phone]. Thank you for allowing us to assist in the care of your patient. Therapist Signature: William Chávez, PT, DPT Date: 8/96/4901   Certification Period:  Reporting Period: na  na Time: 7:57 AM   NOTE TO PHYSICIAN:  PLEASE COMPLETE THE ORDERS BELOW AND FAX TO   [facility] Physical Therapy: [phone]. If you are unable to process this request in 24 hours please contact our office: [phone].    ___ I have read the above report and request that my patient continue as recommended.   ___ I have read the above report and request that my patient continue therapy with the following changes/special instructions: ________________________________________________   ___ I have read the above report and request that my patient be discharged from therapy.      Physician Signature:        Date:       Time:

## 2022-07-25 NOTE — PROGRESS NOTES
PHYSICAL THERAPY - DAILY TREATMENT NOTE    Patient Name: Meghann Vickers        Date: 2022  : 1965   YES Patient  Verified  Visit #:     Insurance: Payor: Michelle Aver / Plan: [employer] HMO/CHOICE PLUS/POS / Product Type: HMO /      In time: 8:05 Out time: 9:00   Total Treatment Time: 55     Medicare/BCBS [place] Time Tracking (below)   Total Timed Codes (min):  55 1:1 Treatment Time:  45     TREATMENT AREA =   Right shoulder pain [M25.511]      SUBJECTIVE    Pain Level (on 0 to 10 scale):  0  / 10   Medication Changes/New allergies or changes in medical history, any new surgeries or procedures? NO    If yes, update Summary List   Subjective Functional Status/Changes:  []  No changes reported     Pt reports her shoulder feels good this morning, she states she took Naproxen and tyelnol this weekend which helped tremendously. She reports return to work date is set for August 3. She is trying to get in with her Ortho doc this week for follow up. OBJECTIVE    30 min Therapeutic Exercise:  [x]  See flow sheet   Rationale:      increase ROM and increase strength to improve the patients ability to perform ADL's with greater ease. 15 min Therapeutic Activity: See flow sheet  Reassessment    Rationale:   increase mobility, endurance, and strength to improve patient's ability to complete functional tasks with greater ease. Modalities Rationale:    decrease edema, decrease inflammation, and decrease pain to improve patient's ability to complete functional tasks with less pain.    10 min [x] Estim, type/location: IFC to right shoulder post tx seated                                      []  att     [x]  unatt     []  w/US     [x]  w/ice    []  w/heat   [x] Skin assessment post-treatment (if applicable):    [x]  intact    []  redness- no adverse reaction     []redness - adverse reaction:       min Patient Education:  YES  Reviewed HEP   [x]  Progressed/Changed HEP based on:   Updated HEP     Other Objective/Functional Measures:    AROM limitations most notable in IR behind the back. Ms fatigue reported with progression of activities. Refer to PN     Post Treatment Pain Level (on 0 to 10) scale:   0  / 10     ASSESSMENT    Assessment/Changes in Function:     Refer to PN     []  See Progress Note/Recertification   Patient will continue to benefit from skilled PT services to analyze, cue, progress, modify,, demonstrate, instruct, and address, movement patterns, therapeutic interventions, postural abnormalities, soft tissue restrictions, ROM, strength, functional mobility, body mechanics/ergonomics, and home and community integration, to attain remaining goals.    Progress toward goals / Updated goals:    Refer to PN     PLAN    []  Upgrade activities as tolerated YES Continue plan of care   []  Discharge due to :    []  Other:      Therapist: Chon Caro PT, DPT    Date: 7/25/2022 Time: 7:54 AM     Future Appointments   Date Time Provider Kevin Sarabia   7/25/2022  8:00 AM George Knight, PT [facility]   7/28/2022 10:15 AM Billie Luis [facility]   8/1/2022 11:00 AM Penne Antonia, PT [facility]   8/5/2022 11:45 AM Penne Antonia, PT [facility]   8/8/2022 11:00 AM Penne Antonia, PT [facility]   8/10/2022 11:00 AM Penne Antonia, PT [facility]   8/15/2022 11:00 AM Penne Antonia, PT [facility]   8/18/2022  8:45 AM Penne Antonia, PT [facility]   8/22/2022 11:00 AM Penne Antonia, PT [facility]   8/25/2022  8:45 AM Penne Antonia, PT [facility]   8/29/2022 11:00 AM AMOR Caicedo [facility]   9/1/2022  8:45 AM AMOR Caicedo [facility]

## 2022-07-26 ENCOUNTER — OFFICE VISIT (OUTPATIENT)
Dept: ORTHOPEDIC SURGERY | Age: 57
End: 2022-07-26
Payer: COMMERCIAL

## 2022-07-26 VITALS — WEIGHT: 235 LBS | HEIGHT: 62 IN | TEMPERATURE: 96.6 F | BODY MASS INDEX: 43.24 KG/M2

## 2022-07-26 DIAGNOSIS — M75.101 TEAR OF RIGHT ROTATOR CUFF, UNSPECIFIED TEAR EXTENT, UNSPECIFIED WHETHER TRAUMATIC: Primary | ICD-10-CM

## 2022-07-26 PROCEDURE — 99213 OFFICE O/P EST LOW 20 MIN: CPT | Performed by: PHYSICIAN ASSISTANT

## 2022-07-26 NOTE — LETTER
NOTIFICATION RETURN TO WORK / SCHOOL    7/26/2022 1:36 PM    Ms. Kathy Alonzo  [address] [address]      To Whom It May Concern:    Kathy Alonzo is currently under the care of Ragini Shi. She will remain on no duty status at work for 1 month. If there are questions or concerns please have the patient contact our office.         Sincerely,      Sheron Dandy, PA

## 2022-07-26 NOTE — PROGRESS NOTES
Parminder Kwok  1965     HISTORY OF PRESENT ILLNESS  Parminder Kwok is a 65 y.o. female who presents today for reevaluation s/p Right shoulder arthroscopic 4cm rotator cuff repair on 4/8/22. Patient has been going to PT. Describes pain as a 2/10. She actually feels a little better today. She had called into the office to schedule a possible steroid injection because she had an increase in discomfort which has since improved with taking OTC Naprosyn. Has also been taking Tylenol for discomfort. Describes a dull aching pain in the top of the shoulder and posteriorly. Overall she notes improved ROM however still struggling with certain movements such as reaching behind and straight up. Patient denies any fever, chills, chest pain, shortness of breath or calf pain. The remainder of the review of systems is negative. There are no new illness or injuries to report since last seen in the office. No changes in medications, allergies, social or family history. PHYSICAL EXAM:   Visit Vitals  Temp (!) 96.6 °F (35.9 °C) (Temporal)   Ht 5' 2\" (1.575 m)   Wt 235 lb (106.6 kg)   BMI 42.98 kg/m²        The patient is a well-developed, well-nourished female in no acute distress. The patient is alert and oriented times three. The patient appears to be well groomed. Mood and affect are normal.  LYMPHATIC: lymph nodes are not enlarged and are within normal limits  SKIN: normal in color and non tender to palpation. There are no bruises or abrasions noted. NEUROLOGICAL: Motor sensory exam is within normal limits. Reflexes are equal bilaterally. There is normal sensation to pinprick and light touch   ORTHOPEDIC EXAM of right shoulder:  Inspection: swelling present,  Bruising not present  Incisions well healed  Passive glenohumeral abduction 0-90 degrees  Stability: Stable  Strength: 4/5  2+ distal pulses    IMAGING: MRI arthrogram of right shoulder read and reviewed by myself reveals: 1.  Contrast extravasation from glenohumeral joint injection into the subacromial  space with no focal gap, rupture or retraction of the repaired rotator cuff  tendons. Likely from perforation. 2. Contrast mixture extends into the post operative [facility] joint as well. 3. Infraspinatus and supraspinatus muscular atrophy. IMPRESSION:  S/P Right shoulder arthroscopic 4cm rotator cuff repair  Encounter Diagnosis   Name Primary? Tear of right rotator cuff, unspecified tear extent, unspecified whether traumatic Yes         PLAN:   1. Patient overall improving post-operatively. Will cont with PT, okay to transition to physician-directed home exercise program when ready. Remain on no duty X 1 month    Risk factors include: n/a  2. No cortisone injection indicated today   3. Yes Physical/Occupational Therapy indicated today  4. No diagnostic test indicated today:   5. No durable medical equipment indicated today  6. No referral indicated today   7. No medications indicated today: 8.  No Narcotic indicated today       RTC 3 weeks    Scribed by Marii Coyle) as dictated by LARISSA Juárez PA-C [facility]

## 2022-08-01 ENCOUNTER — HOSPITAL ENCOUNTER (OUTPATIENT)
Dept: PHYSICAL THERAPY | Age: 57
Discharge: HOME | End: 2022-08-01
Payer: COMMERCIAL

## 2022-08-01 PROCEDURE — 97014 ELECTRIC STIMULATION THERAPY: CPT

## 2022-08-01 PROCEDURE — 97110 THERAPEUTIC EXERCISES: CPT

## 2022-08-01 PROCEDURE — 97530 THERAPEUTIC ACTIVITIES: CPT

## 2022-08-01 NOTE — PROGRESS NOTES
PHYSICAL THERAPY - DAILY TREATMENT NOTE    Patient Name: Avelina French        Date: 2022  : 1965   YES Patient  Verified  Visit #:     Insurance: Payor: Chloé Jose / Plan: [employer] HMO/CHOICE PLUS/POS / Product Type: HMO /      In time: 11:00 Out time: 11:55   Total Treatment Time: 55     Medicare/BCBS [place] Time Tracking (below)   Total Timed Codes (min):  45 1:1 Treatment Time:  45     TREATMENT AREA =  Right RCR    SUBJECTIVE    Pain Level (on 0 to 10 scale):  1  / 10   Medication Changes/New allergies or changes in medical history, any new surgeries or procedures? NO    If yes, update Summary List   Subjective Functional Status/Changes:  []  No changes reported     \"It feels a little tight today.  Still reaching behind  my back is hard but I hear that takes a while\"\"          OBJECTIVE    Modalities Rationale:    decrease inflammation and decrease pain to improve patient's ability to decrease post exercise soreness  10 min [x] Estim, type/location: Seated Right shd IFC                                     []  att     []  unatt     []  w/US     [x]  w/ice    []  w/heat    min []  Mechanical Traction: type/lbs                   []  pro   []  sup   []  int   []  cont    []  before manual    []  after manual    min []  Ultrasound, settings/location:      min []  Iontophoresis w/ dexamethasone, location:                                               []  take home patch-6hour remove at        []  in clinic    min []  Ice     []  Heat    location/position:     min []  Vasopneumatic Device, press/temp:     min []  Other:    [x] Skin assessment post-treatment (if applicable):    [x]  intact    []  redness- no adverse reaction     []redness - adverse reaction:      30 min Therapeutic Exercise:  [x]  See flow sheet   Rationale:      increase ROM and increase strength to improve the patients ability to Maximize functional mobility       15 min Therapeutic Activity: Per flow sheet   Rationale:  Maximize functional mobility      min Patient Education:  YES  Reviewed HEP   []  Progressed/Changed HEP based on:   Cont HEP     Other Objective/Functional Measures: Added IR str with pulleys  Changed prone Y, I T to supine scap stabs with TB. PT noted fatigue with scap stab 2 however able to chieve full ROM  Frequent cueing for decreased UT tension     Post Treatment Pain Level (on 0 to 10) scale:   1  / 10     ASSESSMENT    Assessment/Changes in Function:     Progressing well, good tolerance to progressing CPR compression length to 30 seconds vs 30 compression      []  See Progress Note/Recertification   Patient will continue to benefit from skilled PT services to analyze, cue, progress, modify,, demonstrate, instruct, and address, movement patterns, therapeutic interventions, postural abnormalities, soft tissue restrictions, ROM, strength, functional mobility, body mechanics/ergonomics, and home and community integration, to attain remaining goals. Progress toward goals / Updated goals: Added IR with pulleys for updated goal 4.       PLAN    []  Upgrade activities as tolerated YES Continue plan of care   []  Discharge due to :    []  Other:      Therapist: Sumaya Sood DPT    Date: 8/1/2022 Time: 11:22 AM     Future Appointments   Date Time Provider Kevin Sarabia   8/5/2022 11:45 AM Josesito Patel PT [facility]   8/8/2022 11:00 AM Josesito Patel PT [facility]   8/10/2022 11:00 AM Josesito Patel PT [facility]   8/15/2022 11:00 AM Josesito Patle PT [facility]   8/15/2022  1:00 PM Zelpha Habermann, PA [facility]   8/18/2022  8:45 AM Josesito Patel PT [facility]   8/22/2022 11:00 AM Josesito Patel PT [facility]   8/25/2022  8:45 AM Josesito Patel PT [facility]   8/29/2022 11:00 AM AMOR Duval [facility]   9/1/2022  8:45 AM AMOR Duval [facility]

## 2022-08-05 ENCOUNTER — HOSPITAL ENCOUNTER (OUTPATIENT)
Dept: PHYSICAL THERAPY | Age: 57
Discharge: HOME | End: 2022-08-05
Payer: COMMERCIAL

## 2022-08-05 PROCEDURE — 97530 THERAPEUTIC ACTIVITIES: CPT

## 2022-08-05 PROCEDURE — 97014 ELECTRIC STIMULATION THERAPY: CPT

## 2022-08-05 PROCEDURE — 97110 THERAPEUTIC EXERCISES: CPT

## 2022-08-05 NOTE — PROGRESS NOTES
PHYSICAL THERAPY - DAILY TREATMENT NOTE    Patient Name: Meghann Vickers        Date: 2022  : 1965   YES Patient  Verified  Visit #:      of   32  Insurance: Payor: Michelle Aver / Plan: [employer] HMO/CHOICE PLUS/POS / Product Type: HMO /      In time: 11:43 Out time: 12:30   Total Treatment Time: 47     Medicare/BCBS [place] Time Tracking (below)   Total Timed Codes (min):  47 1:1 Treatment Time:  47     TREATMENT AREA =  Right shoulder pain [M25.511]  SUBJECTIVE    Pain Level (on 0 to 10 scale):  1  / 10   Medication Changes/New allergies or changes in medical history, any new surgeries or procedures? NO    If yes, update Summary List   Subjective Functional Status/Changes:  []  No changes reported     Pt reports that she continues to have pain with reaching forward, to the side and behind her back. Pt requests pictures of exercises that she does with theraband so that she can do them at home.        OBJECTIVE    Modalities Rationale:  decrease pain to improve patient's ability to perform ADLs/IADLs, functional mobility safely and independently without increased pain/symptoms      15 min [x] Estim, type/location: IFC to right shoulder, supine with wedge under B LE                                     []  att     [x]  unatt     []  w/US     [x]  w/ice    []  w/heat    min []  Mechanical Traction: type/lbs                   []  pro   []  sup   []  int   []  cont    []  before manual    []  after manual    min []  Ultrasound, settings/location:      min []  Iontophoresis w/ dexamethasone, location:                                               []  take home patch       []  in clinic    min []  Ice     []  Heat    location/position:     min []  Vasopneumatic Device, press/temp:     min []  Other:    [x] Skin assessment post-treatment (if applicable):    [x]  intact    []  redness- no adverse reaction     []redness - adverse reaction:      24 min Therapeutic Exercise:  [x]  See flow sheet   Rationale:      increase ROM and increase strength to improve the patients ability to perform ADLs/IADLs, functional mobility safely and independently without increased pain/symptoms     8 min Therapeutic Activity: Wall pushup, pulleys for shoulder IR, shoulder flex to 90, wall slide with tap   Rationale: increased ROM and increase strength to improve ability to reach and perform ADLs/IADLs and work duties (including CPR) without increased pain/symptoms      During TE min Patient Education:  YES  Reviewed HEP   [x]  Progressed/Changed HEP based on:   Issued t-band exercises (see below) and green t-band     Other Objective/Functional Measures:    Exercises per flow sheet    Access Code: [email or web address]  URL: [email or web address]  Date: 08/05/2022  Prepared by: Anca Alvarado    Exercises  Standing Shoulder Row with Anchored Resistance - 1 x daily - 7 x weekly - 1 sets - 10 reps - 3 second hold  Shoulder extension with resistance - Neutral - 1 x daily - 7 x weekly - 1 sets - 10 reps - 3 sec hold  Shoulder External Rotation with Anchored Resistance - 1 x daily - 7 x weekly - 1 sets - 10 reps - 3 second hold  Shoulder Internal Rotation with Resistance - 1 x daily - 7 x weekly - 1 sets - 10 reps - 3 second hold  Supine Shoulder Horizontal Abduction with Resistance - 1 x daily - 7 x weekly - 1 sets - 10 reps - 3 second hold       Post Treatment Pain Level (on 0 to 10) scale:   0  / 10     ASSESSMENT    Assessment/Changes in Function:     Tolerated exercises without complaints     []  See Progress Note/Recertification   Patient will continue to benefit from skilled PT services to modify and progress therapeutic interventions, address functional mobility deficits, address ROM deficits, address strength deficits, analyze and address soft tissue restrictions, analyze and cue movement patterns, analyze and modify body mechanics/ergonomics, assess and modify postural abnormalities, and instruct in home and community integration to attain remaining goals. Progress toward goals / Updated goals:    Progressing toward goals:  1. Pt will be independent with HEP at D/C for self management. 2. Pt will demonstrate 160* active FL to allow her to reach into kitchen cabinets. - Performed wall slide with tap   3. Pt will demonstrate 4+/5 R shoulder FL strength to allow her to tolerate lifting activities. - Performed standing shoulder flex to 90 for strengthening  4. Pt will demonstrate improved IR behind the back to at least L3 to improve ability to wash her back.  - Cont pulley IR behind back       PLAN    [x]  Upgrade activities as tolerated YES Continue plan of care   []  Discharge due to :    []  Other:      Therapist: Wade Dao PT    Date: 8/5/2022 Time: 11:45 AM     Future Appointments   Date Time Provider Kevin Sarabia   8/8/2022  2:00 PM Savi Gr, AMOR [facility]   8/10/2022  2:00 PM Savi Gr PT [facility]   8/15/2022  1:00 PM SHO Love [facility]   8/15/2022  3:30 PM Kait Lopez [facility]   8/18/2022  8:45 AM Savi Gr PT [facility]   8/22/2022 11:00 AM Savi Gr PT [facility]   8/25/2022  8:45 AM Savi Gr PT [facility]   8/29/2022 11:00 AM Savi Gr PT [facility]   9/1/2022  8:45 AM [facility]

## 2022-08-08 ENCOUNTER — TELEPHONE (OUTPATIENT)
Dept: ORTHOPEDIC SURGERY | Age: 57
End: 2022-08-08

## 2022-08-08 ENCOUNTER — HOSPITAL ENCOUNTER (OUTPATIENT)
Dept: PHYSICAL THERAPY | Age: 57
End: 2022-08-08
Payer: COMMERCIAL

## 2022-08-08 NOTE — TELEPHONE ENCOUNTER
Anushka from [employer], patient's disability, called stating the patient's HR department wanted to know if the patient could possibly return to work with restrictions instead of no duty. Anushka can be reached at [phone] or email at [email or web address]

## 2022-08-08 NOTE — LETTER
NOTIFICATION RETURN TO WORK / SCHOOL    8/9/2022 8:41 AM    Ms. Michelle Hager  [address] [address]      To Whom It May Concern:    Michelle Hager is currently under the care of [address] [place], [place] [address]casandra Shi. Patient will remain on NO DUTY until next evaluation on Aug 15th, 2022. If there are questions or concerns please have the patient contact our office.         Sincerely,      SHO Zapata

## 2022-08-10 ENCOUNTER — HOSPITAL ENCOUNTER (OUTPATIENT)
Dept: PHYSICAL THERAPY | Age: 57
Discharge: HOME | End: 2022-08-10
Payer: COMMERCIAL

## 2022-08-10 PROCEDURE — 97014 ELECTRIC STIMULATION THERAPY: CPT

## 2022-08-10 PROCEDURE — 97110 THERAPEUTIC EXERCISES: CPT

## 2022-08-10 NOTE — PROGRESS NOTES
PHYSICAL THERAPY - DAILY TREATMENT NOTE    Patient Name: Jonna Diaz        Date: 8/10/2022  : 1965   YES Patient  Verified  Visit #:   32   of   32  Insurance: Payor: Anette Levin / Plan: [employer] HMO/CHOICE PLUS/POS / Product Type: HMO /      In time: 2:00 Out time: 2:53   Total Treatment Time: 53     Medicare/BCBS [place] Time Tracking (below)   Total Timed Codes (min):  53 1:1 Treatment Time:  53     TREATMENT AREA =  Right shoulder pain [M25.511    SUBJECTIVE    Pain Level (on 0 to 10 scale):  1  / 10   Medication Changes/New allergies or changes in medical history, any new surgeries or procedures? NO    If yes, update Summary List   Subjective Functional Status/Changes:  []  No changes reported     Pt reports she is hoping to return to work at the end of the month. She states continuing to experience intermittent ache/sharp pain in shoulder with certain movements. OBJECTIVE    43 min Therapeutic Exercise:  [x]  See flow sheet   Rationale:      increase ROM and increase strength to improve the patients ability to perform ADL's with greater ease. Modalities Rationale:    decrease inflammation, decrease pain, and increase tissue extensibility to improve patient's ability to complete functional tasks with less pain.    10 min [x] Estim, type/location: IFC to R shoulder post tx supine with wedge                                     []  att     [x]  unatt     []  w/US     [x]  w/ice    []  w/heat   [x] Skin assessment post-treatment (if applicable):    [x]  intact    []  redness- no adverse reaction     []redness - adverse reaction:       min Patient Education:  YES  Reviewed HEP   []  Progressed/Changed HEP based on:   Cont with HEP     Other Objective/Functional Measures:    Progression:   - resistance for rows, triceps, bicep curls   - progression from neutral to 90* abduction for ER (supported) body weight  - body blade neutral position  - towel pass behind back for IR mobility   - supine D2 using yellow TB     Post Treatment Pain Level (on 0 to 10) scale:   0-1  / 10     ASSESSMENT    Assessment/Changes in Function:     Pt with good tolerance to progression of exercises/resistance. []  See Progress Note/Recertification   Patient will continue to benefit from skilled PT services to analyze, cue, progress, modify,, demonstrate, instruct, and address, movement patterns, therapeutic interventions, postural abnormalities, soft tissue restrictions, ROM, strength, functional mobility, body mechanics/ergonomics, and home and community integration, to attain remaining goals. Progress toward goals / Updated goals:    Progressing toward goals:  1. Pt will be independent with HEP at D/C for self management. 2. Pt will demonstrate 160* active FL to allow her to reach into kitchen cabinets. - Performed wall slide with tap   3. Pt will demonstrate 4+/5 R shoulder FL strength to allow her to tolerate lifting activities. - Performed standing shoulder flex to 90 for strengthening  4. Pt will demonstrate improved IR behind the back to at least L3 to improve ability to wash her back.  - Cont pulley IR behind back       PLAN    []  Upgrade activities as tolerated YES Continue plan of care   []  Discharge due to :    []  Other:      Therapist: Nicol Antonio PT, DPT    Date: 8/10/2022 Time: 8:16 AM     Future Appointments   Date Time Provider Kevin Sarabia   8/10/2022  2:00 PM Cheo Cassidy PT [facility] [address]   8/12/2022 11:00 AM Blaze Banegas PTA [facility] [address]   8/15/2022  1:00 PM SHO Rosales [facility] BS AMB   8/15/2022  3:30 PM Derek Croft [facility] [address]   8/18/2022  8:45 AM Cheo Cassidy PT [facility] [address]   8/22/2022 11:00 AM Cheo Cassidy PT [facility] [address]   8/25/2022  8:45 AM Cheo Cassidy PT [facility] [address]   8/29/2022 11:00 AM Cheo Cassidy PT [facility] [address]   9/1/2022  8:45 AM Cheo Cassidy PT [facility] [address]

## 2022-08-12 ENCOUNTER — HOSPITAL ENCOUNTER (OUTPATIENT)
Dept: PHYSICAL THERAPY | Age: 57
Discharge: HOME | End: 2022-08-12
Payer: COMMERCIAL

## 2022-08-12 PROCEDURE — 97014 ELECTRIC STIMULATION THERAPY: CPT

## 2022-08-12 PROCEDURE — 97110 THERAPEUTIC EXERCISES: CPT

## 2022-08-12 NOTE — PROGRESS NOTES
PHYSICAL THERAPY - DAILY TREATMENT NOTE    Patient Name: Justin Riggins        Date: 2022  : 1965   yes Patient  Verified  Visit #:   32   of   32  Insurance: Payor: Brennon Hung / Plan: [employer] HMO/CHOICE PLUS/POS / Product Type: HMO /      In time: 1100 Out time: 9937   Total Treatment Time: 74     Medicare/BCBS Time Tracking (below)   Total Timed Codes (min):  64 1:1 Treatment Time:  54     TREATMENT AREA =  Right shoulder pain [M25.511]    SUBJECTIVE  Pain Level (on 0 to 10 scale):  0  / 10   Medication Changes/New allergies or changes in medical history, any new surgeries or procedures?    no  If yes, update Summary List   Subjective Functional Status/Changes:  []  No changes reported     \"I am not able to lift my arm without it shaking. It weak. I feel it in my neck a lot too.  I know I need to work on my neck exercises bc they help, I stopped and now it bothers me in my sleep\"         OBJECTIVE  Modalities Rationale:     decrease inflammation and decrease pain to improve patient's ability to carry/lift/reach/perform ADLs without pain or difficulty     10 min [x] Estim, type/location: IFC to Right SH                                     []  att     [x]  unatt     []  w/US     [x]  w/ice    []  w/heat   [x]Skin assessment post-treatment (if applicable):   [x]  intact    []  redness- no adverse reaction                  []redness - adverse reaction:      64 min Therapeutic Exercise:  [x]  See flow sheet   Rationale:      increase ROM and increase strength to improve the patients ability to carry/lift/reach/perform ADLs without pain or difficulty        Billed With/As:   [x] TE   [] TA   [] Neuro   [] Self Care Patient Education: [x] Review HEP    [] Progressed/Changed HEP based on:   [x] positioning   [x] body mechanics   [x] transfers   [] heat/ice application    [x] other:Pt ed on importance and benefits of compliance with HEP, core strength/stability and proper posture; pt verbalized understanding        Other Objective/Functional Measures:    VCs + demo to perform proper technique and for safety with TE  performed 3 way bicep curls, and increased reps without c/o p!    initiated rows with B UE on [employer] at 12.5#, Supine SH flex with body blade flex and SH IR AAROM with dowel behind back;  no c/o pain noted at this time     Post Treatment Pain Level (on 0 to 10) scale:   0  / 10     ASSESSMENT  Assessment/Changes in Function:   Progressed TE without c/o increase p! []  See Progress Note/Recertification   Patient will continue to benefit from skilled PT services to modify and progress therapeutic interventions, address functional mobility deficits, address ROM deficits, address strength deficits, analyze and address soft tissue restrictions, analyze and cue movement patterns, analyze and modify body mechanics/ergonomics, assess and modify postural abnormalities, and instruct in home and community integration to attain remaining goals. Progress toward goals / Updated goals:  Pt will be independent with HEP at D/C for self management. 2. Pt will demonstrate 160* active FL to allow her to reach into kitchen cabinets. ~100 degs with no UT compensation  3. Pt will demonstrate 4+/5 R shoulder FL strength to allow her to tolerate lifting activities. addressed with CRM and 1#   4. Pt will demonstrate improved IR behind the back to at least L3 to improve ability to wash her back.         PLAN  [x]  Upgrade activities as tolerated yes Continue plan of care   []  Discharge due to :    []  Other:      Therapist: Francesca Choi PTA    Date: 8/12/2022 Time: 1:16 PM     Future Appointments   Date Time Provider Kevin Sarabia   8/15/2022  1:00 PM [facility] BS AMB   8/15/2022  3:30 PM Jonathan Sims [facility]   8/18/2022  8:45 AM George Knight PT [facility]   8/22/2022 11:00 AM George Knight PT [facility]   8/25/2022  8:45 AM [facility] 8/29/2022 11:00 AM AMOR [facility]   9/1/2022  8:45 AM AMOR [facility]

## 2022-08-15 ENCOUNTER — HOSPITAL ENCOUNTER (OUTPATIENT)
Dept: PHYSICAL THERAPY | Age: 57
Discharge: HOME | End: 2022-08-15
Payer: COMMERCIAL

## 2022-08-15 ENCOUNTER — OFFICE VISIT (OUTPATIENT)
Dept: ORTHOPEDIC SURGERY | Age: 57
End: 2022-08-15
Payer: COMMERCIAL

## 2022-08-15 VITALS
OXYGEN SATURATION: 99 % | RESPIRATION RATE: 16 BRPM | BODY MASS INDEX: 44.53 KG/M2 | HEART RATE: 76 BPM | TEMPERATURE: 97.1 F | WEIGHT: 242 LBS | HEIGHT: 62 IN

## 2022-08-15 DIAGNOSIS — M75.101 TEAR OF RIGHT ROTATOR CUFF, UNSPECIFIED TEAR EXTENT, UNSPECIFIED WHETHER TRAUMATIC: Primary | ICD-10-CM

## 2022-08-15 PROCEDURE — 97014 ELECTRIC STIMULATION THERAPY: CPT

## 2022-08-15 PROCEDURE — 99213 OFFICE O/P EST LOW 20 MIN: CPT | Performed by: PHYSICIAN ASSISTANT

## 2022-08-15 PROCEDURE — 97110 THERAPEUTIC EXERCISES: CPT

## 2022-08-15 NOTE — PROGRESS NOTES
Devin Corona  1965     HISTORY OF PRESENT ILLNESS  Devin Corona is a 65 y.o. female who presents today for reevaluation s/p Right shoulder arthroscopic 4cm rotator cuff repair on 4/8/22. Patient has been going to PT. Describes pain as a 2/10. She overall feels like she is improving. She has slight discomfort and still notes some weakness but does notice that this improves each week. Patient denies any fever, chills, chest pain, shortness of breath or calf pain. The remainder of the review of systems is negative. There are no new illness or injuries to report since last seen in the office. No changes in medications, allergies, social or family history. PHYSICAL EXAM:   Visit Vitals  Pulse 76   Temp 97.1 °F (36.2 °C) (Temporal)   Resp 16   Ht 5' 2\" (1.575 m)   Wt 242 lb (109.8 kg)   SpO2 99%   BMI 44.26 kg/m²          The patient is a well-developed, well-nourished female in no acute distress. The patient is alert and oriented times three. The patient appears to be well groomed. Mood and affect are normal.  LYMPHATIC: lymph nodes are not enlarged and are within normal limits  SKIN: normal in color and non tender to palpation. There are no bruises or abrasions noted. NEUROLOGICAL: Motor sensory exam is within normal limits. Reflexes are equal bilaterally. There is normal sensation to pinprick and light touch   ORTHOPEDIC EXAM of right shoulder:  Inspection: swelling present,  Bruising not present  Incisions well healed  Passive glenohumeral abduction 0-90 degrees  Stability: Stable  Strength: 4+/5  2+ distal pulses    IMAGING: MRI arthrogram of right shoulder read and reviewed by myself reveals: 1. Contrast extravasation from glenohumeral joint injection into the subacromial  space with no focal gap, rupture or retraction of the repaired rotator cuff  tendons. Likely from perforation. 2. Contrast mixture extends into the post operative [facility] joint as well.   3. Infraspinatus and supraspinatus muscular atrophy. IMPRESSION:  S/P Right shoulder arthroscopic 4cm rotator cuff repair  Encounter Diagnosis   Name Primary? Tear of right rotator cuff, unspecified tear extent, unspecified whether traumatic Yes         PLAN:   1. Patient overall improving post-operatively. Will cont with PT, okay to transition to physician-directed home exercise program when ready. She will return to work on August 25, 2022 full duty no restrictions. Risk factors include: n/a  2. No cortisone injection indicated today   3. Yes Physical/Occupational Therapy indicated today  4. No diagnostic test indicated today:   5. No durable medical equipment indicated today  6. No referral indicated today   7. No medications indicated today:   8.  No Narcotic indicated today       RTC as needed       LARIS[facility] Specialist detailed exam

## 2022-08-15 NOTE — LETTER
NOTIFICATION RETURN TO WORK / SCHOOL    8/15/2022 1:23 PM    Ms. Devin Corona  [address] [address]      To Whom It May Concern:    Devin Corona is currently under the care of [address] [place] [address] Cristian Shi. Patient was treated and evaluated here in our office. She can return to work 08/26/2022 full duty without restrictions. If there are questions or concerns please have the patient contact our office.         Sincerely,      SHO Santoro

## 2022-08-15 NOTE — PROGRESS NOTES
PHYSICAL THERAPY - DAILY TREATMENT NOTE    Patient Name: Bryan Oconnell        Date: 8/15/2022  : 1965   yes Patient  Verified  Visit #:      32  Insurance: Payor: Pinky Calero / Plan: [employer] HMO/CHOICE PLUS/POS / Product Type: HMO /      In time: 333 Out time: 440   Total Treatment Time: 67     Medicare/BCBS Time Tracking (below)   Total Timed Codes (min): 57 1:1 Treatment Time:  47     TREATMENT AREA =  Right shoulder pain [M25.511]    SUBJECTIVE  Pain Level (on 0 to 10 scale):  1 / 10   Medication Changes/New allergies or changes in medical history, any new surgeries or procedures?    no  If yes, update Summary List   Subjective Functional Status/Changes:  []  No changes reported     \"I am sore today but its not bad.  I been keeping up with my HEP for sure\"         OBJECTIVE  Modalities Rationale:     decrease inflammation and decrease pain to improve patient's ability to carry/lift/reach/perform ADLs without pain or difficulty     10 min [x] Estim, type/location: IFC to Right SH                                     []  att     [x]  unatt     []  w/US     [x]  w/ice    []  w/heat   [x]Skin assessment post-treatment (if applicable):   [x]  intact    []  redness- no adverse reaction                  []redness - adverse reaction:      57 min Therapeutic Exercise:  [x]  See flow sheet   Rationale:      increase ROM and increase strength to improve the patients ability to carry/lift/reach/perform ADLs without pain or difficulty        Billed With/As:   [x] TE   [] TA   [] Neuro   [] Self Care Patient Education: [x] Review HEP    [] Progressed/Changed HEP based on:   [x] positioning   [x] body mechanics   [x] transfers   [] heat/ice application    [x] other:Pt ed on importance and benefits of compliance with HEP, core strength/stability and proper posture; pt verbalized understanding        Other Objective/Functional Measures:    VCs + demo to perform proper technique and for safety with TE    initiated  IR/ER body blade in supine with no p!    demos IR AAROM with dowel to L4, with 4/10 p!; 6/10 with pulleys, subsided with rest   lowered to YTB with 90/90 ER due to decrease AROM with OTB   Post Treatment Pain Level (on 0 to 10) scale:   1  / 10     ASSESSMENT  Assessment/Changes in Function:   Progressed TE without c/o increase p! AAROM IR to L4, progressing towards LTG #3   []  See Progress Note/Recertification   Patient will continue to benefit from skilled PT services to modify and progress therapeutic interventions, address functional mobility deficits, address ROM deficits, address strength deficits, analyze and address soft tissue restrictions, analyze and cue movement patterns, analyze and modify body mechanics/ergonomics, assess and modify postural abnormalities, and instruct in home and community integration to attain remaining goals. Progress toward goals / Updated goals:  Pt will be independent with HEP at D/C for self management. 2. Pt will demonstrate 160* active FL to allow her to reach into kitchen cabinets. ~100 degs with no UT compensation  3. Pt will demonstrate 4+/5 R shoulder FL strength to allow her to tolerate lifting activities. addressed with CRM and 1#   4. Pt will demonstrate improved IR behind the back to at least L3 to improve ability to wash her back.  addressed with pulleys and dowel         PLAN  [x]  Upgrade activities as tolerated yes Continue plan of care   []  Discharge due to :    []  Other:      Therapist: Farideh Blankenship PTA    Date: 8/15/2022 Time: 1:16 PM     Future Appointments   Date Time Provider Kevin Sarabia   8/18/2022  8:45 AM Kenia Memory, PT [facility]   8/22/2022 11:00 AM Kenia Memory, PT [facility]   8/25/2022  8:45 AM Kenia Memory, PT [facility]   8/29/2022 11:00 AM Kenia Memory, PT [facility]   9/1/2022  8:45 AM Kenia Memory, PT [facility]

## 2022-08-18 ENCOUNTER — HOSPITAL ENCOUNTER (OUTPATIENT)
Dept: PHYSICAL THERAPY | Age: 57
Discharge: HOME | End: 2022-08-18
Payer: COMMERCIAL

## 2022-08-18 PROCEDURE — 97110 THERAPEUTIC EXERCISES: CPT

## 2022-08-18 NOTE — PROGRESS NOTES
PHYSICAL THERAPY - DAILY TREATMENT NOTE    Patient Name: Tremaine Moreno        Date: 2022  : 1965   YES Patient  Verified  Visit #:     Insurance: Payor: Colleen Teran / Plan: [employer] HMO/CHOICE PLUS/POS / Product Type: HMO /      In time: 8:50 Out time:  9:32   Total Treatment Time: 42     Medicare/BCBS [place] Time Tracking (below)   Total Timed Codes (min):  42 1:1 Treatment Time:  42     TREATMENT AREA =  Right shoulder pain [M25.511]    SUBJECTIVE    Pain Level (on 0 to 10 scale):  0  / 10   Medication Changes/New allergies or changes in medical history, any new surgeries or procedures? NO    If yes, update Summary List   Subjective Functional Status/Changes:  []  No changes reported     Pt reports having a follow up with her MD, she states it went very well. Pt reports she will be ready for DC next week. Plans to return to work full duty . OBJECTIVE    42 min Therapeutic Exercise:  [x]  See flow sheet   Rationale:      increase ROM and increase strength to improve the patients ability to perform ADL's with greater ease. min Patient Education:  YES  Reviewed HEP   []  Progressed/Changed HEP based on:   Cont with HEP     Other Objective/Functional Measures:    Emphasis on strengthening, progressed load today to further challenge patient. Post Treatment Pain Level (on 0 to 10) scale:   0  / 10     ASSESSMENT    Assessment/Changes in Function:     Good tolerance to progression of exercises today. DC in 2 visits. []  See Progress Note/Recertification   Patient will continue to benefit from skilled PT services to analyze, cue, progress, modify,, demonstrate, instruct, and address, movement patterns, therapeutic interventions, postural abnormalities, soft tissue restrictions, ROM, strength, functional mobility, body mechanics/ergonomics, and home and community integration, to attain remaining goals.    Progress toward goals / Updated goals:    Progress toward goals / Updated goals:  Pt will be independent with HEP at D/C for self management. 2. Pt will demonstrate 160* active FL to allow her to reach into kitchen cabinets. ~100 degs with no UT compensation  3. Pt will demonstrate 4+/5 R shoulder FL strength to allow her to tolerate lifting activities. addressed with CRM and 1#   4. Pt will demonstrate improved IR behind the back to at least L3 to improve ability to wash her back.  addressed with pulleys and dowel        PLAN    []  Upgrade activities as tolerated YES Continue plan of care   []  Discharge due to :    []  Other:      Therapist: Bárbara Fuchs PT, DPT    Date: 8/18/2022 Time: 8:12 AM     Future Appointments   Date Time Provider Kevin Sarabia   8/18/2022  8:45 AM Nubia Davies, PT [facility]   8/22/2022 11:00 AM Nubia Davies PT [facility]   8/25/2022  8:45 AM Nubia Davies PT [facility]   8/29/2022 11:00 AM Nubia Davies PT [facility]   9/1/2022  8:45 AM [facility]

## 2022-08-22 ENCOUNTER — HOSPITAL ENCOUNTER (OUTPATIENT)
Dept: PHYSICAL THERAPY | Age: 57
End: 2022-08-22
Payer: COMMERCIAL

## 2022-08-23 ENCOUNTER — APPOINTMENT (OUTPATIENT)
Dept: PHYSICAL THERAPY | Age: 57
End: 2022-08-23
Payer: COMMERCIAL

## 2022-08-23 NOTE — PROGRESS NOTES
[facility] PHYSICAL THERAPY  [address] [place] [address] Martha Brar, [address] - Phone: [phone]  Fax: [phone]  DISCHARGE SUMMARY FOR PHYSICAL THERAPY          Patient Name: Loc Lewis : 1965   Treatment/Medical Diagnosis: Right shoulder pain [M25.511]   Referral Source: Clark Huggins Start of [facility]): 22   Prior Hospitalization: See medical history Provider #: [id number]   Visits from [facility]: 29 Missed Visits: 2       Key Functional Changes/Progress: Pt called and requested to be discharged from PT today. Since last PN, patient has demonstrated steady gains in strength as seen with progression of load/reps for exercises to challenge RC strength and endurance, good tolerance to progression. No significant changes made in AROM since last PN but able to demonstrate functional ranges in all planes except IR. Pt remained limited in IR behind the back but was steadily improving (L4 with mild-moderate pain at end range, last PN R PSIS). Pt is able to demonstrate independence with learned exercises. Pt is being discharged today secondary to above factors. Pt was educated to continue with daily exercise program and to contact the clinic should symptoms return or worsen. Thank you for allowing me the opportunity to assist in this case. Assessments/Recommendations: Discontinue therapy. Progressing towards or have reached established goals. If you have any questions/comments please contact us directly at [phone]. Thank you for allowing us to assist in the care of your patient. Therapist Signature: Alex Olmos, PT, DPT Date: 2022   Reporting Period: na Time: 50:67 AM      Certification Period: na       NOTE TO PHYSICIAN:  PLEASE COMPLETE THE ORDERS BELOW AND FAX TO   [facility] Physical Therapy: [phone].   If you are unable to process this request in 24 hours please contact our office: [phone].    ___ I have read the above report and request that my patient be discharged from therapy.      Physician Signature:        Date:       Time:

## 2022-08-25 ENCOUNTER — APPOINTMENT (OUTPATIENT)
Dept: PHYSICAL THERAPY | Age: 57
End: 2022-08-25
Payer: COMMERCIAL

## 2022-08-29 ENCOUNTER — APPOINTMENT (OUTPATIENT)
Dept: PHYSICAL THERAPY | Age: 57
End: 2022-08-29
Payer: COMMERCIAL

## 2022-09-01 ENCOUNTER — APPOINTMENT (OUTPATIENT)
Dept: PHYSICAL THERAPY | Age: 57
End: 2022-09-01

## 2022-10-11 NOTE — PROGRESS NOTES
Rex Rashid  1965   Chief Complaint   Patient presents with    Knee Pain     Follow up - Bilateral knee pain         HISTORY OF PRESENT ILLNESS  Rex Rashid is a 65 y.o. female who presents today for reevaluation of bilateral knee pain. She states she has had this for some time but lately the pain has been worsening. She states her knees are a dull throbbing aching sensation. Her left is worse than the right. She feels very stiff first thing in the morning and improves with activity. At the end of the day she feels more swollen. She does have a buckling sensation. Denies any falls. The right leg pain has been steadily progressing over the last couple weeks. She feels a burning and painful sensation that goes down her buttock and into her right leg. She describes numbness and tingling. She denies any loss of bowel or bladder control pain is a 4/10. Patient denies any fever, chills, chest pain, shortness of breath or calf pain. The remainder of the review of systems is negative. There are no new illness or injuries to report since last seen in the office. No changes in medications, allergies, social or family history. PHYSICAL EXAM:   Visit Vitals  Pulse 88   Temp 97.3 °F (36.3 °C) (Temporal)   Ht 5' 2\" (1.575 m)   Wt 247 lb (112 kg)   SpO2 98%   BMI 45.18 kg/m²     The patient is a well-developed, well-nourished female   in no acute distress. The patient is alert and oriented times three. The patient is alert and oriented times three. Mood and affect are normal.  LYMPHATIC: lymph nodes are not enlarged and are within normal limits  SKIN: normal in color and non tender to palpation. There are no bruises or abrasions noted. NEUROLOGICAL: Motor sensory exam is within normal limits. Reflexes are equal bilaterally.  There is normal sensation to pinprick and light touch  MUSCULOSKELETAL:  Examination Lumbar   Skin Intact   Tenderness, Paraspinal muscles +   Paraspinal muscle spasms + Flexion Fingertips to ankle   Extension 10   Knee reflexes Normal   Ankle reflexes Normal   Straight leg raise +   Calf tenderness -     Examination Left knee Right knee   Skin Intact Intact   Range of motion 5-120 5-120   Effusion + +   Medial joint line tenderness + +   Lateral joint line tenderness - -   Tenderness Pes Bursa - -   Tenderness insertion MCL - -   Tenderness insertion LCL - -   Ladis - -   Patella crepitus + +   Patella grind + +   Lachman - -   Pivot shift - -   Anterior drawer - -   Posterior drawer - -   Varus stress - -   Valgus stress - -   Neurovascular Intact Intact   Calf Swelling and Tenderness to Palpation - -   Faith's Test - -   Hamstring Cord Tightness + +          PROCEDURE: Bilateral knee Injection with Ultrasound Guidance    Indication:Bilateral Knee pain/swelling    After sterile prep, 4 cc of Xylocaine and 1 cc of Kenalog were injected into the bilateral  Knee. Intra-articular Ultrasound images captured using [address] Ultrasound machine using a frequency of 10 MHz with a linear transducer and scanned into patient's chart.      [facility] - [facility]  OFFICE PROCEDURE PROGRESS NOTE        Chart reviewed for the following:  Maciej VALENTIN PA, have reviewed the History, Physical and updated the Allergic reactions for Steffany performed immediately prior to start of procedure:  Maciej VALENTIN PA-C, have performed the following reviews on Emerald Bishop prior to the start of the procedure:            * Patient was identified by name and date of birth   * Agreement on procedure being performed was verified  * Risks and Benefits explained to the patient  * Procedure site verified and marked as necessary  * Patient was positioned for comfort  * Consent was signed and verified     Time: 1:56 PM    Date of procedure: 10/12/2022    Procedure performed by:  SHO Moyer    Provider assisted by: (see medication administration)    How tolerated by patient: tolerated the procedure well with no complications    Comments: none      IMAGIN view x-rays of bilateral knees read and reviewed by myself on 10/12/2022 reveal degenerative changes with joint space narrowing in the medial and patellofemoral compartment. No acute abnormalities    2 view x-rays of the lumbar spine taken in the office on 10/12/2022 read and reviewed by myself reveal spondylitic changes with Retrolisthesis L4-L5. IMPRESSION:      ICD-10-CM ICD-9-CM    1. Primary osteoarthritis of left knee  M17.12 715.16 AMB POC XRAY, KNEE; 1/2 VIEWS      2. Primary osteoarthritis of right knee  M17.11 715.16 AMB POC XRAY, KNEE; 1/2 VIEWS      3. Bilateral sciatica  M54.31 724.3 AMB POC XRAY, SPINE, LUMBOSACRAL; 2 O    M54.32  REFERRAL TO PHYSICAL THERAPY      4. Retrolisthesis  M43.10 738.4 REFERRAL TO PHYSICAL THERAPY      5. Radicular pain of right lower extremity  M54.10 724.4 REFERRAL TO PHYSICAL THERAPY           PLAN:   1. Patient with bilateral knee degenerative arthritis acute exacerbation. Discussed treatment options with the patient today. We will inject under ultrasound guidance today with cortisone. A physician directed exercise program was given to the patient today. Patient with acute lumbar pain with radicular symptoms consistent with sciatica. Treatment options with patient. A physician directed exercise program was given to patient today we will also refer to formal physical therapy if she does not notice improvement within the first week. Prescription for Medrol Dosepak and baclofen was given    Risk factors include: bmi>45  2. Yes cortisone injection indicated today   3. Yes Physical/Occupational Therapy indicated today  4. No diagnostic test indicated today:   5. No durable medical equipment indicated today  6. No referral indicated today   7. Yes medications indicated today:   8.  No Narcotic indicated today     RTC 3 weeks if pain persists       [facility]

## 2022-10-12 ENCOUNTER — OFFICE VISIT (OUTPATIENT)
Dept: ORTHOPEDIC SURGERY | Age: 57
End: 2022-10-12
Payer: COMMERCIAL

## 2022-10-12 VITALS
TEMPERATURE: 97.3 F | BODY MASS INDEX: 45.45 KG/M2 | OXYGEN SATURATION: 98 % | HEIGHT: 62 IN | WEIGHT: 247 LBS | HEART RATE: 88 BPM

## 2022-10-12 DIAGNOSIS — M17.12 PRIMARY OSTEOARTHRITIS OF LEFT KNEE: Primary | ICD-10-CM

## 2022-10-12 DIAGNOSIS — M54.31 BILATERAL SCIATICA: ICD-10-CM

## 2022-10-12 DIAGNOSIS — M54.32 BILATERAL SCIATICA: ICD-10-CM

## 2022-10-12 DIAGNOSIS — M17.11 PRIMARY OSTEOARTHRITIS OF RIGHT KNEE: ICD-10-CM

## 2022-10-12 DIAGNOSIS — M54.10 RADICULAR PAIN OF RIGHT LOWER EXTREMITY: ICD-10-CM

## 2022-10-12 DIAGNOSIS — M43.10 RETROLISTHESIS: ICD-10-CM

## 2022-10-12 PROCEDURE — 73560 X-RAY EXAM OF KNEE 1 OR 2: CPT | Performed by: PHYSICIAN ASSISTANT

## 2022-10-12 PROCEDURE — 99214 OFFICE O/P EST MOD 30 MIN: CPT | Performed by: PHYSICIAN ASSISTANT

## 2022-10-12 PROCEDURE — 90000 AMB POC XRAY, KNEE; 1/2 VIEWS: CPT | Performed by: PHYSICIAN ASSISTANT

## 2022-10-12 PROCEDURE — 20611 DRAIN/INJ JOINT/BURSA W/US: CPT | Performed by: PHYSICIAN ASSISTANT

## 2022-10-12 PROCEDURE — 72100 X-RAY EXAM L-S SPINE 2/3 VWS: CPT | Performed by: PHYSICIAN ASSISTANT

## 2022-10-12 RX ORDER — METHYLPREDNISOLONE 4 MG/1
TABLET ORAL
Qty: 1 DOSE PACK | Refills: 0 | Status: SHIPPED | OUTPATIENT
Start: 2022-10-12

## 2022-10-12 RX ORDER — BACLOFEN 10 MG/1
10 TABLET ORAL 3 TIMES DAILY
Qty: 90 TABLET | Refills: 1 | Status: SHIPPED | OUTPATIENT
Start: 2022-10-12 | End: 2022-11-04

## 2022-10-12 RX ORDER — TRIAMCINOLONE ACETONIDE 40 MG/ML
40 INJECTION, SUSPENSION INTRA-ARTICULAR; INTRAMUSCULAR ONCE
Status: COMPLETED | OUTPATIENT
Start: 2022-10-12 | End: 2022-10-12

## 2022-10-12 RX ORDER — ZOLPIDEM TARTRATE 12.5 MG/1
12.5 TABLET, FILM COATED, EXTENDED RELEASE ORAL
COMMUNITY
Start: 2022-09-15

## 2022-10-12 RX ADMIN — TRIAMCINOLONE ACETONIDE 40 MG: 40 INJECTION, SUSPENSION INTRA-ARTICULAR; INTRAMUSCULAR at 14:11

## 2022-10-25 ENCOUNTER — TELEPHONE (OUTPATIENT)
Dept: ORTHOPEDIC SURGERY | Age: 57
End: 2022-10-25

## 2022-10-25 DIAGNOSIS — M54.10 RADICULAR PAIN OF RIGHT LOWER EXTREMITY: Primary | ICD-10-CM

## 2022-10-25 RX ORDER — HYDROCODONE BITARTRATE AND ACETAMINOPHEN 5; 325 MG/1; MG/1
1 TABLET ORAL
Qty: 28 TABLET | Refills: 0 | Status: SHIPPED | OUTPATIENT
Start: 2022-10-25 | End: 2022-11-01

## 2022-10-25 NOTE — TELEPHONE ENCOUNTER
Patient is scheduled to see Dr Ania Richards on 11/16/22. She wants to know if Dr Edinson Avendaño can provide her with relief until she sees spine. She states she is taking Baclofen 4x per day. She is in constant pain, worse in the AM and it wakes her up at night. She completed the medrol dosepak. Lidocaine patches and voltaren gel do not help. Please call patient to advise, [phone]. Thank you.

## 2022-10-25 NOTE — TELEPHONE ENCOUNTER
Patient states she is still having some back pain,and  has finished taking the dose pack, but is still taking the muscle relaxer's which are not helping with her pain. Patient is asking what she can or should do next. Patient states she can barley stand, and in a bent over position. Patient was offered the next available appt, and declined. Please advise.       Patient can be reached at [phone]

## 2022-10-27 ENCOUNTER — HOSPITAL ENCOUNTER (OUTPATIENT)
Dept: MRI IMAGING | Age: 57
Discharge: HOME | End: 2022-10-27
Attending: ORTHOPAEDIC SURGERY
Payer: COMMERCIAL

## 2022-10-27 DIAGNOSIS — M54.10 RADICULAR PAIN OF RIGHT LOWER EXTREMITY: ICD-10-CM

## 2022-10-27 PROCEDURE — 72148 MRI LUMBAR SPINE W/O DYE: CPT

## 2022-10-28 ENCOUNTER — TELEPHONE (OUTPATIENT)
Dept: ORTHOPEDIC SURGERY | Age: 57
End: 2022-10-28

## 2022-11-02 ENCOUNTER — TELEPHONE (OUTPATIENT)
Dept: ORTHOPEDIC SURGERY | Age: 57
End: 2022-11-02

## 2022-11-04 RX ORDER — BACLOFEN 10 MG/1
TABLET ORAL
Qty: 90 TABLET | Refills: 1 | Status: SHIPPED | OUTPATIENT
Start: 2022-11-04

## 2022-11-16 ENCOUNTER — OFFICE VISIT (OUTPATIENT)
Dept: ORTHOPEDIC SURGERY | Age: 57
End: 2022-11-16
Payer: COMMERCIAL

## 2022-11-16 VITALS
RESPIRATION RATE: 18 BRPM | BODY MASS INDEX: 44.87 KG/M2 | TEMPERATURE: 97.7 F | WEIGHT: 243.8 LBS | HEART RATE: 77 BPM | OXYGEN SATURATION: 100 % | HEIGHT: 62 IN

## 2022-11-16 DIAGNOSIS — M48.061 SPINAL STENOSIS AT L4-L5 LEVEL: ICD-10-CM

## 2022-11-16 DIAGNOSIS — M54.10 RADICULAR PAIN OF RIGHT LOWER EXTREMITY: Primary | ICD-10-CM

## 2022-11-16 DIAGNOSIS — M47.812 CERVICAL SPONDYLOSIS: ICD-10-CM

## 2022-11-16 PROCEDURE — 99204 OFFICE O/P NEW MOD 45 MIN: CPT | Performed by: PHYSICAL MEDICINE & REHABILITATION

## 2022-11-16 RX ORDER — PREGABALIN 50 MG/1
50 CAPSULE ORAL 2 TIMES DAILY
Qty: 60 CAPSULE | Refills: 0 | Status: SHIPPED | OUTPATIENT
Start: 2022-11-16 | End: 2022-12-16

## 2022-11-16 NOTE — PROGRESS NOTES
Lettie Nageotte presents today for   Chief Complaint   Patient presents with    Back Pain    Leg Pain       Is someone accompanying this pt? no    Is the patient using any DME equipment during OV? no    Depression Screening:  3 most recent PHQ Screens 12/27/2021   Little interest or pleasure in doing things Not at all   Feeling down, depressed, irritable, or hopeless Several days   Total Score PHQ 2 1       Learning Assessment:  Learning Assessment 11/19/2020   PRIMARY LEARNER Patient   HIGHEST LEVEL OF EDUCATION - PRIMARY LEARNER  -   BARRIERS PRIMARY LEARNER -   CO-LEARNER CAREGIVER -   PRIMARY LANGUAGE ENGLISH   LEARNER PREFERENCE PRIMARY READING     DEMONSTRATION     LISTENING     VIDEOS   ANSWERED BY patient   RELATIONSHIP SELF       Abuse Screening:  Abuse Screening Questionnaire 11/19/2020   Do you ever feel afraid of your partner? N   Are you in a relationship with someone who physically or mentally threatens you? N   Is it safe for you to go home? Y       Fall Risk  No flowsheet data found. OPIOID RISK TOOL  No flowsheet data found. Coordination of Care:  1. Have you been to the ER, urgent care clinic since your last visit? no  Hospitalized since your last visit? no    2. Have you seen or consulted any other health care providers outside of the [address] [place] [address] since your last visit? no Include any pap smears or colon screening.  no Size Of Lesion: 0.4 x 0.4 cm Detail Level: Detailed

## 2022-11-16 NOTE — H&P (VIEW-ONLY)
[address]  [address], [address]  [address]  Phone: [phone]  Fax: [phone]      Patient: Azra Espinoza                                                                              MRN: [id number]        YOB: 1965          AGE: 65 y.o. PCP: Christ Avilez MD  Date:  11/16/22    Reason for Consultation: Back Pain and Leg Pain      HPI:  Azra Espinoza is a 65 y.o. female with relevant PMH of HTN, GERD, gastric bypass 2017 who presents with low back pain radiating down R>L leg. She also reports neck pain which radiates into bilateral arms. Neurologic symptoms: +numbness, tingling, bilateral feet. No weakness, bowel or bladder changes. No recent falls      Location: The pain is located in the low back pain , neck   Radiation: The pain does radiate down bilateral legs R>L. Pain Score: Currently: 6/10  At Best: 5/10  At Worst: 10/10   Quality: Pain is of a Burning, Cramping, Stiff, Tingling, Numbness, and Pulling quality. Aggravating: Pain is exacerbated by walking, sitting, standing, and getting up in the morning   Alleviating: The pain is alleviated by  sitting in recliner    Prior Treatments:  Physical therapy: YES completed 8/2022 for right RTC repair  Injections:NO    Previous Medications: medrol pack , gabapentin   Current Medications: voltaren gel, baclofen 10mg in the am, 20mg at night , norco at night   Previous work-up has included:     MRI lumbar spine 10/27/22  L1-2: No significant disc displacement. Preserved disc height and hydration. Mild hypertrophic facet arthropathy, thickening of ligamentum flavum narrows the posterior thecal space. No significant neural foraminal or central canal  stenosis. L2-3: Mild loss of normal disc hydration. Minimal broad-based disc ridging flattens the ventral thecal space.  Moderate hypertrophic facet arthropathy, thickening of ligamentum flavum effaces the posterior thecal space resulting in moderate spinal canal stenosis. Small bilateral facet joint effusions. Mild bilateral neural foraminal stenosis. L3-4: Broad-based disc bulge flattens the ventral thecal space. Moderate bilateral hypertrophic facet arthropathy, thickening of ligament flavum effaces the posterior thecal space resulting in moderate spinal canal stenosis. Mild  bilateral neural foraminal stenosis. L4-5: Broad-based disc bulge flattens the ventral thecal space. Advanced bilateral hypertrophic facet arthropathy, thickening of ligamentum flavum effaces the posterior thecal space resulting in severe spinal canal stenosis. Right 4 mm synovial cyst extends into the right lateral recess, likely resulting in compression of the descending L5 nerve root. Bilateral facet joint effusions. Moderate right and mild left neural foraminal stenosis. L5-S1: Minimal broad-based disc bulge flattens the ventral thecal space. Patent canal and foramina. Past Medical History:   Past Medical History:   Diagnosis Date    Arthritis     osteoarthritis    Fibroid, uterine     GERD (gastroesophageal reflux disease)     Gout     Headache     Hypertension     Post-menopausal bleeding     Sleep apnea     Uses CPAP      Past Surgical History:   Past Surgical History:   Procedure Laterality Date    COLONOSCOPY N/A 4/5/2021    COLONOSCOPY performed by Salvador Hager MD at [address] [place] [address]  2010    HX GASTRIC BYPASS  10/2017    HX LAP GASTRIC BYPASS  2017    HX TOTAL LAPAROSCOPIC HYSTERECTOMY  11/23/2021    HX TUBAL LIGATION      I&D ABCESS SIMP Left 11/24/2020    I and D of left back abscess    AR ANESTH,SURGERY OF SHOULDER Right 04/08/2022    Rt Shoulder Arthroscopic rotator cuff repair      SocHx:   Social History     Tobacco Use    Smoking status: Never    Smokeless tobacco: Never   Substance Use Topics    Alcohol use: Yes     Comment: occasionally/socially      FamHx:?    Family History   Problem Relation Age of Onset    Diabetes Mother     Hypertension Mother     Elevated Lipids Mother     Diabetes Father     Hypertension Father     Elevated Lipids Father        Current Medications:    Current Outpatient Medications   Medication Sig Dispense Refill    baclofen (LIORESAL) 10 mg tablet TAKE 1 TABLET BY MOUTH THREE TIMES A DAY 90 Tablet 1    zolpidem CR (AMBIEN CR) 12.5 mg tablet Take 12.5 mg by mouth nightly. methylPREDNISolone (MEDROL DOSEPACK) 4 mg tablet Per dose pack instructions 1 Dose Pack 0    diclofenac (VOLTAREN) 1 % gel Apply 4 g to affected area four (4) times daily. 500 g 2    estradioL (CLIMARA) 0.1 mg/24 hr APPLY 1 PATCH TRANSDERMAL EVERY 7 DAYS. azelastine (ASTELIN) 137 mcg (0.1 %) nasal spray 1 Spray two (2) times a day. Use in each nostril as directed      FLUoxetine (PROzac) 10 mg capsule Take  by mouth daily. doxepin (SINEquan) 10 mg capsule Take 10 mg by mouth nightly. buPROPion SR (WELLBUTRIN SR) 150 mg SR tablet Take  by mouth two (2) times a day. famotidine (PEPCID) 20 mg tablet Take 40 mg by mouth daily. fluticasone propionate (FLONASE) 50 mcg/actuation nasal spray 2 Sprays daily. cetirizine (ZYRTEC) 10 mg tablet Take 10 mg by mouth daily. polyethylene glycol (MIRALAX) 17 gram/dose powder Take 17 g by mouth daily. ergocalciferol (ERGOCALCIFEROL) 1,250 mcg (50,000 unit) capsule TAKE ONE CAPSULE BY MOUTH WEEKLY FOR 7 DAYS      lisinopril-hydroCHLOROthiazide (PRINZIDE, ZESTORETIC) 20-25 mg per tablet TAKE 1 TABLET BY MOUTH EVERY DAY      therapeutic multivitamin (THERAGRAN) tablet Take 2 Tabs by mouth daily. acetaminophen (TYLENOL) 500 mg tablet Take  by mouth every six (6) hours as needed for Pain.      montelukast (SINGULAIR) 10 mg tablet Take 10 mg by mouth daily. cpap machine kit by Does Not Apply route. Allergies:     Allergies   Allergen Reactions    Augmentin [Amoxicillin-Pot Clavulanate] Diarrhea    Bactrim [Sulfamethoprim Ss] Itching        Review of Systems:   Gen:    Denied fevers, chills, malaise, fatigue, weight changes   Resp: Denied shortness of breath, cough, wheezing   CVS: Denied chest pain, palpitations   : Denied urinary urgency, frequency, incontinence   GI: Denied nausea, vomiting, constipation, diarrhea   Skin: Denied rashes, wounds   Psych: Denied anxiety, depression   Vasc: Denied claudication, ulcers   Hem: Denied easy bruising/bleeding   MSK: See HPI   Neuro: See HPI         Physical Exam     Vital Signs: Visit Vitals  Pulse 77   Temp 97.7 °F (36.5 °C) (Temporal)   Resp 18   Ht 5' 2\" (1.575 m)   Wt 243 lb 12.8 oz (110.6 kg)   SpO2 100% Comment: RA   BMI 44.59 kg/m²      General: ??????? Well nourished and well developed female without any acute distress   Psychiatric: ?  Alert and oriented x 3 with normal mood    HEENT: ???????? Atraumatic   Respiratory:   Breathing non-labored and non dyspneic   CV: ???????????????? Peripheral pulses intact, no peripheral edema   Skin: ????????????? No rashes       Neurologic: ?? Sensation: normal and grossly intact thebilateral, upper extremity(s), lower extremity(s)   Strength: 5/5 in the bilateral, upper extremity(s), lower extremity(s)  Reflexes: reveals 2+ symmetric DTRs throughout except 1+ right achilles   Gait: normal   Upper tract signs: Babinski down going, Cerna's negative ? Cervical full ROM    Musculoskeletal: Lumbar Exam     Inspection:   Alignment: Normal  Atrophy: None       Tenderness to Palpation:   Lumbar paraspinals Positive  Lumbar spinous processes Negative  SI Joint:  Negative  Gluteal:Positive   Greater trochanter: Negative      ROM:   Lumbar ROM: Abnormal pain with extension  Lumbar facet loading: Positive  Hip ROM: No reproduction of pain with movement -seated    Special Tests      Slump test: Positive right          ,  Medical Decision Making:    Images:  The imaging results as well as the actual images of the studies below were reviewed, visualized and interpreted by me. Labs: The results below were reviewed. MRI lumbar spine 10/27/22  Lumbar facet arthritis  Severe spinal stenosis L4/5  Severe right lateral recess narrowing due to facet arthritis and synovial cyst at L4/5      Assessment:   - lumbar spinal stenosis L4-5  -right L5 radiculopathy  - cervical spondylosis    Plan:      -Physical therapy -  discussed PT, she would like to hold off as she just had extensive PT for her rotator cuff repair   -Medications - will try lyrica 50mg bid and increase as tolerated  -continue baclofen 10mg in am 20mg qhs  Counseled regarding side effects and appropriate administration of medications.    -Diagnostics/Imaging - reviewed MRI lumbar spine   -Injections - referral to try right L5 TF ALEXANDER    -Lifestyle - Encouraged weight loss   -Education - The patient's diagnosis, prognosis and treatment options were discussed today. All questions were answered. F/U - after ALEXANDER.   Consider further imaging cervical spine          [facility]

## 2022-11-16 NOTE — PROGRESS NOTES
[address]  [address]  [address]  Phone: [phone]  Fax: [phone]      Patient: Rufus Feliz                                                                              MRN: [id number]        YOB: 1965          AGE: 65 y.o. PCP: Anthony Nails MD  Date:  11/16/22    Reason for Consultation: Back Pain and Leg Pain      HPI:  Rufus Feliz is a 65 y.o. female with relevant PMH of HTN, GERD, gastric bypass 2017 who presents with low back pain radiating down R>L leg. She also reports neck pain which radiates into bilateral arms. Neurologic symptoms: +numbness, tingling, bilateral feet. No weakness, bowel or bladder changes. No recent falls      Location: The pain is located in the low back pain , neck   Radiation: The pain does radiate down bilateral legs R>L. Pain Score: Currently: 6/10  At Best: 5/10  At Worst: 10/10   Quality: Pain is of a Burning, Cramping, Stiff, Tingling, Numbness, and Pulling quality. Aggravating: Pain is exacerbated by walking, sitting, standing, and getting up in the morning   Alleviating: The pain is alleviated by  sitting in recliner    Prior Treatments:  Physical therapy: YES completed 8/2022 for right RTC repair  Injections:NO    Previous Medications: medrol pack , gabapentin   Current Medications: voltaren gel, baclofen 10mg in the am, 20mg at night , norco at night   Previous work-up has included:     MRI lumbar spine 10/27/22  L1-2: No significant disc displacement. Preserved disc height and hydration. Mild hypertrophic facet arthropathy, thickening of ligamentum flavum narrows the posterior thecal space. No significant neural foraminal or central canal  stenosis. L2-3: Mild loss of normal disc hydration. Minimal broad-based disc ridging flattens the ventral thecal space.  Moderate hypertrophic facet arthropathy, thickening of ligamentum flavum effaces the posterior thecal space resulting in moderate spinal canal stenosis. Small bilateral facet joint effusions. Mild bilateral neural foraminal stenosis. L3-4: Broad-based disc bulge flattens the ventral thecal space. Moderate bilateral hypertrophic facet arthropathy, thickening of ligament flavum effaces the posterior thecal space resulting in moderate spinal canal stenosis. Mild  bilateral neural foraminal stenosis. L4-5: Broad-based disc bulge flattens the ventral thecal space. Advanced bilateral hypertrophic facet arthropathy, thickening of ligamentum flavum effaces the posterior thecal space resulting in severe spinal canal stenosis. Right 4 mm synovial cyst extends into the right lateral recess, likely resulting in compression of the descending L5 nerve root. Bilateral facet joint effusions. Moderate right and mild left neural foraminal stenosis. L5-S1: Minimal broad-based disc bulge flattens the ventral thecal space. Patent canal and foramina. Past Medical History:   Past Medical History:   Diagnosis Date    Arthritis     osteoarthritis    Fibroid, uterine     GERD (gastroesophageal reflux disease)     Gout     Headache     Hypertension     Post-menopausal bleeding     Sleep apnea     Uses CPAP      Past Surgical History:   Past Surgical History:   Procedure Laterality Date    COLONOSCOPY N/A 4/5/2021    COLONOSCOPY performed by Aime Morgan MD at [address] [place] [address]  2010    HX GASTRIC BYPASS  10/2017    HX LAP GASTRIC BYPASS  2017    HX TOTAL LAPAROSCOPIC HYSTERECTOMY  11/23/2021    HX TUBAL LIGATION      I&D ABCESS SIMP Left 11/24/2020    I and D of left back abscess    MI ANESTH,SURGERY OF SHOULDER Right 04/08/2022    Rt Shoulder Arthroscopic rotator cuff repair      SocHx:   Social History     Tobacco Use    Smoking status: Never    Smokeless tobacco: Never   Substance Use Topics    Alcohol use: Yes     Comment: occasionally/socially      FamHx:?    Family History   Problem Relation Age of Onset    Diabetes Mother     Hypertension Mother     Elevated Lipids Mother     Diabetes Father     Hypertension Father     Elevated Lipids Father        Current Medications:    Current Outpatient Medications   Medication Sig Dispense Refill    baclofen (LIORESAL) 10 mg tablet TAKE 1 TABLET BY MOUTH THREE TIMES A DAY 90 Tablet 1    zolpidem CR (AMBIEN CR) 12.5 mg tablet Take 12.5 mg by mouth nightly. methylPREDNISolone (MEDROL DOSEPACK) 4 mg tablet Per dose pack instructions 1 Dose Pack 0    diclofenac (VOLTAREN) 1 % gel Apply 4 g to affected area four (4) times daily. 500 g 2    estradioL (CLIMARA) 0.1 mg/24 hr APPLY 1 PATCH TRANSDERMAL EVERY 7 DAYS. azelastine (ASTELIN) 137 mcg (0.1 %) nasal spray 1 Spray two (2) times a day. Use in each nostril as directed      FLUoxetine (PROzac) 10 mg capsule Take  by mouth daily. doxepin (SINEquan) 10 mg capsule Take 10 mg by mouth nightly. buPROPion SR (WELLBUTRIN SR) 150 mg SR tablet Take  by mouth two (2) times a day. famotidine (PEPCID) 20 mg tablet Take 40 mg by mouth daily. fluticasone propionate (FLONASE) 50 mcg/actuation nasal spray 2 Sprays daily. cetirizine (ZYRTEC) 10 mg tablet Take 10 mg by mouth daily. polyethylene glycol (MIRALAX) 17 gram/dose powder Take 17 g by mouth daily. ergocalciferol (ERGOCALCIFEROL) 1,250 mcg (50,000 unit) capsule TAKE ONE CAPSULE BY MOUTH WEEKLY FOR 7 DAYS      lisinopril-hydroCHLOROthiazide (PRINZIDE, ZESTORETIC) 20-25 mg per tablet TAKE 1 TABLET BY MOUTH EVERY DAY      therapeutic multivitamin (THERAGRAN) tablet Take 2 Tabs by mouth daily. acetaminophen (TYLENOL) 500 mg tablet Take  by mouth every six (6) hours as needed for Pain.      montelukast (SINGULAIR) 10 mg tablet Take 10 mg by mouth daily. cpap machine kit by Does Not Apply route. Allergies:     Allergies   Allergen Reactions    Augmentin [Amoxicillin-Pot Clavulanate] Diarrhea    Bactrim [Sulfamethoprim Ss] Itching        Review of Systems:   Gen:    Denied fevers, chills, malaise, fatigue, weight changes   Resp: Denied shortness of breath, cough, wheezing   CVS: Denied chest pain, palpitations   : Denied urinary urgency, frequency, incontinence   GI: Denied nausea, vomiting, constipation, diarrhea   Skin: Denied rashes, wounds   Psych: Denied anxiety, depression   Vasc: Denied claudication, ulcers   Hem: Denied easy bruising/bleeding   MSK: See HPI   Neuro: See HPI         Physical Exam     Vital Signs: Visit Vitals  Pulse 77   Temp 97.7 °F (36.5 °C) (Temporal)   Resp 18   Ht 5' 2\" (1.575 m)   Wt 243 lb 12.8 oz (110.6 kg)   SpO2 100% Comment: RA   BMI 44.59 kg/m²      General: ??????? Well nourished and well developed female without any acute distress   Psychiatric: ?  Alert and oriented x 3 with normal mood    HEENT: ???????? Atraumatic   Respiratory:   Breathing non-labored and non dyspneic   CV: ???????????????? Peripheral pulses intact, no peripheral edema   Skin: ????????????? No rashes       Neurologic: ?? Sensation: normal and grossly intact thebilateral, upper extremity(s), lower extremity(s)   Strength: 5/5 in the bilateral, upper extremity(s), lower extremity(s)  Reflexes: reveals 2+ symmetric DTRs throughout except 1+ right achilles   Gait: normal   Upper tract signs: Babinski down going, Cerna's negative ? Cervical full ROM    Musculoskeletal: Lumbar Exam     Inspection:   Alignment: Normal  Atrophy: None       Tenderness to Palpation:   Lumbar paraspinals Positive  Lumbar spinous processes Negative  SI Joint:  Negative  Gluteal:Positive   Greater trochanter: Negative      ROM:   Lumbar ROM: Abnormal pain with extension  Lumbar facet loading: Positive  Hip ROM: No reproduction of pain with movement -seated    Special Tests      Slump test: Positive right          ,  Medical Decision Making:    Images:  The imaging results as well as the actual images of the studies below were reviewed, visualized and interpreted by me. Labs: The results below were reviewed. MRI lumbar spine 10/27/22  Lumbar facet arthritis  Severe spinal stenosis L4/5  Severe right lateral recess narrowing due to facet arthritis and synovial cyst at L4/5      Assessment:   - lumbar spinal stenosis L4-5  -right L5 radiculopathy  - cervical spondylosis    Plan:      -Physical therapy -  discussed PT, she would like to hold off as she just had extensive PT for her rotator cuff repair   -Medications - will try lyrica 50mg bid and increase as tolerated  -continue baclofen 10mg in am 20mg qhs  Counseled regarding side effects and appropriate administration of medications.    -Diagnostics/Imaging - reviewed MRI lumbar spine   -Injections - referral to try right L5 TF ALEXANDER    -Lifestyle - Encouraged weight loss   -Education - The patient's diagnosis, prognosis and treatment options were discussed today. All questions were answered. F/U - after ALEXANDER.   Consider further imaging cervical spine          [facility]

## 2022-11-29 ENCOUNTER — TELEPHONE (OUTPATIENT)
Dept: ORTHOPEDIC SURGERY | Age: 57
End: 2022-11-29

## 2022-11-29 DIAGNOSIS — M17.12 PRIMARY OSTEOARTHRITIS OF LEFT KNEE: Primary | ICD-10-CM

## 2022-11-29 NOTE — TELEPHONE ENCOUNTER
Patient thought we were ordering an injection series for her knee pain and was concerned about missing our call because her mailbox was full. Is this something we're working on for her?

## 2022-12-06 ENCOUNTER — HOSPITAL ENCOUNTER (OUTPATIENT)
Age: 57
Setting detail: OUTPATIENT SURGERY
Discharge: HOME | End: 2022-12-06
Attending: PHYSICAL MEDICINE & REHABILITATION | Admitting: PHYSICAL MEDICINE & REHABILITATION
Payer: COMMERCIAL

## 2022-12-06 ENCOUNTER — APPOINTMENT (OUTPATIENT)
Dept: GENERAL RADIOLOGY | Age: 57
End: 2022-12-06
Attending: PHYSICAL MEDICINE & REHABILITATION
Payer: COMMERCIAL

## 2022-12-06 VITALS
OXYGEN SATURATION: 97 % | TEMPERATURE: 98 F | DIASTOLIC BLOOD PRESSURE: 74 MMHG | HEART RATE: 75 BPM | SYSTOLIC BLOOD PRESSURE: 118 MMHG | RESPIRATION RATE: 14 BRPM

## 2022-12-06 PROCEDURE — 74011000636 HC RX REV CODE- 636: Performed by: PHYSICAL MEDICINE & REHABILITATION

## 2022-12-06 PROCEDURE — 77030003672 HC NDL SPN HALY -A: Performed by: PHYSICAL MEDICINE & REHABILITATION

## 2022-12-06 PROCEDURE — 2709999900 HC NON-CHARGEABLE SUPPLY: Performed by: PHYSICAL MEDICINE & REHABILITATION

## 2022-12-06 PROCEDURE — 76010000009 HC PAIN MGT 0 TO 30 MIN PROC: Performed by: PHYSICAL MEDICINE & REHABILITATION

## 2022-12-06 PROCEDURE — 74011250636 HC RX REV CODE- 250/636: Performed by: PHYSICAL MEDICINE & REHABILITATION

## 2022-12-06 PROCEDURE — 74011000250 HC RX REV CODE- 250: Performed by: PHYSICAL MEDICINE & REHABILITATION

## 2022-12-06 PROCEDURE — 64483 NJX AA&/STRD TFRM EPI L/S 1: CPT | Performed by: PHYSICAL MEDICINE & REHABILITATION

## 2022-12-06 PROCEDURE — 77030003669 HC NDL SPN COOK -B: Performed by: PHYSICAL MEDICINE & REHABILITATION

## 2022-12-06 PROCEDURE — 77030039433 HC TY MYLEOGRAM BD -B: Performed by: PHYSICAL MEDICINE & REHABILITATION

## 2022-12-06 PROCEDURE — 74011250637 HC RX REV CODE- 250/637: Performed by: PHYSICAL MEDICINE & REHABILITATION

## 2022-12-06 RX ORDER — LIDOCAINE HYDROCHLORIDE 10 MG/ML
INJECTION, SOLUTION EPIDURAL; INFILTRATION; INTRACAUDAL; PERINEURAL AS NEEDED
Status: DISCONTINUED | OUTPATIENT
Start: 2022-12-06 | End: 2022-12-06 | Stop reason: HOSPADM

## 2022-12-06 RX ORDER — BACLOFEN 10 MG/1
TABLET ORAL
Qty: 90 TABLET | Refills: 1 | Status: SHIPPED | OUTPATIENT
Start: 2022-12-06

## 2022-12-06 RX ORDER — DIAZEPAM 5 MG/1
5-20 TABLET ORAL ONCE
Status: COMPLETED | OUTPATIENT
Start: 2022-12-06 | End: 2022-12-06

## 2022-12-06 RX ORDER — DEXAMETHASONE SODIUM PHOSPHATE 100 MG/10ML
INJECTION INTRAMUSCULAR; INTRAVENOUS AS NEEDED
Status: DISCONTINUED | OUTPATIENT
Start: 2022-12-06 | End: 2022-12-06 | Stop reason: HOSPADM

## 2022-12-06 RX ADMIN — DIAZEPAM 10 MG: 5 TABLET ORAL at 10:36

## 2022-12-06 NOTE — PERIOP NOTES
Patient verbalized understanding of discharge instructions and verbally consented to [facility] Patient Consent. Signature pad not working.

## 2022-12-06 NOTE — PROCEDURES
SELECTIVE NERVE ROOT BLOCK PROCEDURE NOTE      Patient Name: Enrique Berger  Date of Procedure: December 6, 2022  Preoperative Diagnosis:  Lumbar Radicular Pain  Post Operative Diagnosis:  Lumbar Radicular Pain  Location:  [address] [place] [address]    Procedure :    right L5 Selective Nerve Root Block    Consent:  Informed consent was obtained prior to the procedure. The patient was given the opportunity to ask questions regarding the procedure and its associated risks. In addition to the potential risks associated with the procedure itself, the patient was informed both verbally and in writing of the potential side effects of the use of glucocorticoid. The patient appeared to comprehend the informed consent and desired to have the procedure performed. Procedure: The patient was placed in the prone position on the fluoroscopy table and the back was prepped and draped in the usual sterile manner. The exact spinal level was  identified using fluoroscopy, and Lidocaine 1 % was injected locally, a 22 gauge spinal needle was passed to the transverse process. The depth was noted and the needle redirected to pass inferior and approximately one cm anterior to the transverse process. YES  1 cc of Isovue M-200 was used to verify positioning in the epidural and paravertebral space and outlined the course of the spinal nerve into the epidural space. The same procedure was repeated at each spinal level indicated above. No vascular uptake was identified. A total of 10 mg of preservative free Dexamethasone and 1 cc of Lidocaine/site was slowly injected. The patient tolerated the procedure well. The injection area was cleaned and bandaids applied. Not excessive bleeding was noted. Patient dressed and discharged to home with instructions. Discussion: The patient tolerated the procedure well. Patient reported daphnie-procedural pain on Visual Analog Scale:  pre-7; post-3. Juanjose Abdalla MD  December 6, 2022

## 2022-12-06 NOTE — INTERVAL H&P NOTE
Update History & Physical    The Patient's History and Physical of November 16, 2022 was reviewed. There was no change. The surgical site was confirmed by the patient and me. Plan:  The risk, benefits, expected outcome, and alternative to the recommended procedure have been discussed with the patient. Patient understands and wants to proceed with the procedure.     Electronically signed by Poonam Perry MD on 12/6/2022 at 11:03 AM

## 2022-12-15 ENCOUNTER — OFFICE VISIT (OUTPATIENT)
Dept: ORTHOPEDIC SURGERY | Age: 57
End: 2022-12-15
Payer: COMMERCIAL

## 2022-12-15 VITALS — HEIGHT: 62 IN | WEIGHT: 248 LBS | BODY MASS INDEX: 45.64 KG/M2 | TEMPERATURE: 97.7 F

## 2022-12-15 DIAGNOSIS — M17.12 PRIMARY OSTEOARTHRITIS OF LEFT KNEE: Primary | ICD-10-CM

## 2022-12-15 DIAGNOSIS — M54.10 RADICULAR PAIN OF RIGHT LOWER EXTREMITY: ICD-10-CM

## 2022-12-15 DIAGNOSIS — M17.11 PRIMARY OSTEOARTHRITIS OF RIGHT KNEE: ICD-10-CM

## 2022-12-15 DIAGNOSIS — M43.10 RETROLISTHESIS: ICD-10-CM

## 2022-12-15 DIAGNOSIS — M54.32 BILATERAL SCIATICA: ICD-10-CM

## 2022-12-15 DIAGNOSIS — M54.31 BILATERAL SCIATICA: ICD-10-CM

## 2022-12-15 NOTE — PROGRESS NOTES
Thersia Essex  1965   Chief Complaint   Patient presents with    Knee Pain     Loc         HISTORY OF PRESENT ILLNESS  Thersia Essex is a 65 y.o. female who presents today for reevaluation of bilateral knee pain. L>R . Patient rates pain as 4/10 today. At last OV on 10/12/2022, patient had a b/l knee cortisone injection which provided some right knee relief. Her left knee feels like it is locking and buckling on her. She was also prescribed Baclofen and Medrol Dosepak at last OV for lumbar pain. She states she has had this for some time but lately the pain has been worsening. She states her knees are a dull throbbing aching sensation. Her left is worse than the right. She feels very stiff first thing in the morning and improves with activity. At the end of the day she feels more swollen. She does have a buckling sensation. Denies any falls. The right leg pain has been steadily progressing over the last couple weeks. She feels a burning and painful sensation that goes down her buttock and into her right leg. She describes numbness and tingling. She denies any loss of bowel or bladder control. Patient denies any fever, chills, chest pain, shortness of breath or calf pain. The remainder of the review of systems is negative. There are no new illness or injuries to report since last seen in the office. No changes in medications, allergies, social or family history. PHYSICAL EXAM:   Visit Vitals  Temp 97.7 °F (36.5 °C) (Temporal)   Ht 5' 2\" (1.575 m)   Wt 248 lb (112.5 kg)   BMI 45.36 kg/m²     The patient is a well-developed, well-nourished female   in no acute distress. The patient is alert and oriented times three. The patient is alert and oriented times three. Mood and affect are normal.  LYMPHATIC: lymph nodes are not enlarged and are within normal limits  SKIN: normal in color and non tender to palpation. There are no bruises or abrasions noted.    NEUROLOGICAL: Motor sensory exam is within normal limits. Reflexes are equal bilaterally. There is normal sensation to pinprick and light touch  MUSCULOSKELETAL:  Examination Lumbar   Skin Intact   Tenderness, Paraspinal muscles +   Paraspinal muscle spasms +   Flexion Fingertips to ankle   Extension 10   Knee reflexes Normal   Ankle reflexes Normal   Straight leg raise +   Calf tenderness -     Examination Left knee Right knee   Skin Intact Intact   Range of motion 5-120 5-120   Effusion + +   Medial joint line tenderness + +   Lateral joint line tenderness - -   Tenderness Pes Bursa - -   Tenderness insertion MCL - -   Tenderness insertion LCL - -   Ladis - -   Patella crepitus + +   Patella grind + +   Lachman - -   Pivot shift - -   Anterior drawer - -   Posterior drawer - -   Varus stress - -   Valgus stress - -   Neurovascular Intact Intact   Calf Swelling and Tenderness to Palpation - -   Faith's Test - -   Hamstring Cord Tightness + +        PROCEDURE: none      IMAGIN view x-rays of bilateral knees read and reviewed by myself on 10/12/2022 reveal degenerative changes with joint space narrowing in the medial and patellofemoral compartment. No acute abnormalities    2 view x-rays of the lumbar spine taken in the office on 10/12/2022 read and reviewed by myself reveal spondylitic changes with Retrolisthesis L4-L5. IMPRESSION:      ICD-10-CM ICD-9-CM    1. Primary osteoarthritis of left knee  M17.12 715.16 MRI KNEE LT WO CONT      2. Primary osteoarthritis of right knee  M17.11 715.16       3. Retrolisthesis  M43.10 738.4       4. Bilateral sciatica  M54.31 724.3     M54.32        5. Radicular pain of right lower extremity  M54.10 724.4              PLAN:   1. Patient with bilateral knee degenerative arthritis - still waiting on [facility] for visco  MRI of left knee r/o MMT vs stress fracture  . Patient with acute lumbar pain with radicular symptoms improving.  Will cont with her physician directed exercise program  Risk factors include: bmi>45  2. No  cortisone injection indicated today   3. Yes Physical/Occupational Therapy indicated today  4. yes diagnostic test indicated today:   5. No durable medical equipment indicated today  6. No referral indicated today   7. No  medications indicated today:   8.  No Narcotic indicated today     RTC after MRI    Scribed by Marii Case) as dictated by LARISSA Pollock PA-C [phone]

## 2023-01-04 ENCOUNTER — HOSPITAL ENCOUNTER (OUTPATIENT)
Dept: MRI IMAGING | Age: 58
Discharge: HOME | End: 2023-01-04
Attending: PHYSICIAN ASSISTANT
Payer: COMMERCIAL

## 2023-01-04 DIAGNOSIS — M17.12 PRIMARY OSTEOARTHRITIS OF LEFT KNEE: ICD-10-CM

## 2023-01-04 PROCEDURE — 73721 MRI JNT OF LWR EXTRE W/O DYE: CPT

## 2023-01-04 RX ORDER — BACLOFEN 10 MG/1
TABLET ORAL
Qty: 90 TABLET | Refills: 1 | Status: SHIPPED | OUTPATIENT
Start: 2023-01-04

## 2023-01-10 ENCOUNTER — OFFICE VISIT (OUTPATIENT)
Dept: ORTHOPEDIC SURGERY | Age: 58
End: 2023-01-10
Payer: COMMERCIAL

## 2023-01-10 DIAGNOSIS — M54.10 RADICULAR PAIN OF RIGHT LOWER EXTREMITY: ICD-10-CM

## 2023-01-10 DIAGNOSIS — M48.061 SPINAL STENOSIS AT L4-L5 LEVEL: Primary | ICD-10-CM

## 2023-01-10 PROCEDURE — 99214 OFFICE O/P EST MOD 30 MIN: CPT | Performed by: PHYSICAL MEDICINE & REHABILITATION

## 2023-01-10 RX ORDER — PREGABALIN 50 MG/1
50 CAPSULE ORAL 2 TIMES DAILY
COMMUNITY
End: 2023-01-10 | Stop reason: ALTCHOICE

## 2023-01-10 RX ORDER — PREGABALIN 100 MG/1
100 CAPSULE ORAL
Qty: 30 CAPSULE | Refills: 0 | Status: SHIPPED | OUTPATIENT
Start: 2023-01-10 | End: 2023-02-09

## 2023-01-10 NOTE — PROGRESS NOTES
[address]  [address]  [address]  Phone: [phone]  Fax: [phone]      Patient: Emerald Bishop                                                                              MRN: [id number]        YOB: 1965          AGE: 65 y.o. PCP: Anne Nj MD  Date:  01/10/23    Reason for Consultation: Back Pain      HPI:  Emerald Bishop is a 65 y.o. female with relevant PMH of HTN, GERD, gastric bypass 2017 who presents with low back pain radiating down R>L leg. She also reports neck pain which radiates into bilateral arms. 12/6/22 she had a right L5 SNRB which gave her about 60% relief for about 2 weeks. After the injection she started to notice pain increased pain down the left leg and was wondering if she can have an injection on her left side. She started lyrica 100mg at night      Neurologic symptoms: +numbness, tingling, bilateral feet. No weakness, bowel or bladder changes. No recent falls      Location: The pain is located in the low back pain , neck   Radiation: The pain does radiate down bilateral legs R>L. Pain Score: Currently: 6/10  At Best: 5/10  At Worst: 10/10   Quality: Pain is of a Burning, Cramping, Stiff, Tingling, Numbness, and Pulling quality. Aggravating: Pain is exacerbated by walking, sitting, standing, and getting up in the morning   Alleviating: The pain is alleviated by  sitting in recliner    Prior Treatments:  Physical therapy: YES completed 8/2022 for right RTC repair  Injections:  12/6/22- right L 5 SNRB 2 weeks 60% relief     Previous Medications: medrol pack , gabapentin   Current Medications: voltaren gel, baclofen 10mg in the am, 20mg at night , norco at night , lyrica 100mg at night   Previous work-up has included:     MRI lumbar spine 10/27/22  L1-2: No significant disc displacement. Preserved disc height and hydration.  Mild hypertrophic facet arthropathy, thickening of ligamentum flavum narrows the posterior thecal space. No significant neural foraminal or central canal  stenosis. L2-3: Mild loss of normal disc hydration. Minimal broad-based disc ridging flattens the ventral thecal space. Moderate hypertrophic facet arthropathy, thickening of ligamentum flavum effaces the posterior thecal space resulting in moderate spinal canal stenosis. Small bilateral facet joint effusions. Mild bilateral neural foraminal stenosis. L3-4: Broad-based disc bulge flattens the ventral thecal space. Moderate bilateral hypertrophic facet arthropathy, thickening of ligament flavum effaces the posterior thecal space resulting in moderate spinal canal stenosis. Mild  bilateral neural foraminal stenosis. L4-5: Broad-based disc bulge flattens the ventral thecal space. Advanced bilateral hypertrophic facet arthropathy, thickening of ligamentum flavum effaces the posterior thecal space resulting in severe spinal canal stenosis. Right 4 mm synovial cyst extends into the right lateral recess, likely resulting in compression of the descending L5 nerve root. Bilateral facet joint effusions. Moderate right and mild left neural foraminal stenosis. L5-S1: Minimal broad-based disc bulge flattens the ventral thecal space. Patent canal and foramina.      Past Medical History:   Past Medical History:   Diagnosis Date    Arthritis     osteoarthritis    Fibroid, uterine     GERD (gastroesophageal reflux disease)     Gout     Headache     Hypertension     Post-menopausal bleeding     Sleep apnea     Uses CPAP      Past Surgical History:   Past Surgical History:   Procedure Laterality Date    COLONOSCOPY N/A 4/5/2021    COLONOSCOPY performed by Cristino Hudson MD at [address] [place] [address]  2010    HX GASTRIC BYPASS  10/2017    HX LAP GASTRIC BYPASS  2017    HX TOTAL LAPAROSCOPIC HYSTERECTOMY  11/23/2021    HX TUBAL LIGATION      I&D ABCESS SIMP Left 11/24/2020    I and D of left back abscess IA ANES [facility] TNDN FSCIA BURSA SHOULDER & AXILLA Right 04/08/2022    Rt Shoulder Arthroscopic rotator cuff repair      SocHx:   Social History     Tobacco Use    Smoking status: Never    Smokeless tobacco: Never   Substance Use Topics    Alcohol use: Yes     Comment: occasionally/socially      FamHx:? Family History   Problem Relation Age of Onset    Diabetes Mother     Hypertension Mother     Elevated Lipids Mother     Diabetes Father     Hypertension Father     Elevated Lipids Father        Current Medications:    Current Outpatient Medications   Medication Sig Dispense Refill    pregabalin (Lyrica) 50 mg capsule Take 50 mg by mouth two (2) times a day. baclofen (LIORESAL) 10 mg tablet TAKE 1 TABLET BY MOUTH THREE TIMES A DAY 90 Tablet 1    zolpidem CR (AMBIEN CR) 12.5 mg tablet Take 12.5 mg by mouth nightly. diclofenac (VOLTAREN) 1 % gel Apply 4 g to affected area four (4) times daily. 500 g 2    estradioL (CLIMARA) 0.1 mg/24 hr APPLY 1 PATCH TRANSDERMAL EVERY 7 DAYS. azelastine (ASTELIN) 137 mcg (0.1 %) nasal spray 1 Spray two (2) times a day. Use in each nostril as directed      FLUoxetine (PROzac) 10 mg capsule Take  by mouth daily. doxepin (SINEquan) 10 mg capsule Take 10 mg by mouth nightly. buPROPion SR (WELLBUTRIN SR) 150 mg SR tablet Take  by mouth two (2) times a day. famotidine (PEPCID) 20 mg tablet Take 40 mg by mouth daily. fluticasone propionate (FLONASE) 50 mcg/actuation nasal spray 2 Sprays daily. cetirizine (ZYRTEC) 10 mg tablet Take 10 mg by mouth daily. polyethylene glycol (MIRALAX) 17 gram/dose powder Take 17 g by mouth daily. ergocalciferol (ERGOCALCIFEROL) 1,250 mcg (50,000 unit) capsule TAKE ONE CAPSULE BY MOUTH WEEKLY FOR 7 DAYS      lisinopril-hydroCHLOROthiazide (PRINZIDE, ZESTORETIC) 20-25 mg per tablet TAKE 1 TABLET BY MOUTH EVERY DAY      therapeutic multivitamin (THERAGRAN) tablet Take 2 Tabs by mouth daily.       acetaminophen (TYLENOL) 500 mg tablet Take  by mouth every six (6) hours as needed for Pain.      montelukast (SINGULAIR) 10 mg tablet Take 10 mg by mouth daily. cpap machine kit by Does Not Apply route. Allergies: Allergies   Allergen Reactions    Augmentin [Amoxicillin-Pot Clavulanate] Diarrhea    Bactrim [Sulfamethoprim Ss] Itching        Review of Systems:   Gen:    Denied fevers, chills, malaise, fatigue, weight changes   Resp: Denied shortness of breath, cough, wheezing   CVS: Denied chest pain, palpitations   : Denied urinary urgency, frequency, incontinence   GI: Denied nausea, vomiting, constipation, diarrhea   Skin: Denied rashes, wounds   Psych: Denied anxiety, depression   Vasc: Denied claudication, ulcers   Hem: Denied easy bruising/bleeding   MSK: See HPI   Neuro: See HPI         Physical Exam     Vital Signs: There were no vitals taken for this visit. General: ??????? Well nourished and well developed female without any acute distress   Psychiatric: ?  Alert and oriented x 3 with normal mood    HEENT: ???????? Atraumatic   Respiratory:   Breathing non-labored and non dyspneic   CV: ???????????????? Peripheral pulses intact, no peripheral edema   Skin: ????????????? No rashes       Neurologic: ?? Sensation: normal and grossly intact thebilateral, upper extremity(s), lower extremity(s)   Strength: 5/5 in the bilateral, upper extremity(s), lower extremity(s)  Reflexes: reveals 2+ symmetric DTRs throughout except 1+ right achilles   Gait: normal   Upper tract signs: Babinski down going, Cerna's negative ?      Cervical full ROM    Musculoskeletal: Lumbar Exam     Inspection:   Alignment: Normal  Atrophy: None       Tenderness to Palpation:   Lumbar paraspinals Positive  Lumbar spinous processes Negative  SI Joint:  Negative  Gluteal:Positive   Greater trochanter: Negative      ROM:   Lumbar ROM: Abnormal pain with extension  Lumbar facet loading: Positive  Hip ROM: No reproduction of pain with movement -seated    Special Tests      Slump test: Positive right          ,  Medical Decision Making:    Images: The imaging results as well as the actual images of the studies below were reviewed, visualized and interpreted by me. Labs: The results below were reviewed. MRI lumbar spine 10/27/22  Lumbar facet arthritis  Severe spinal stenosis L4/5  Severe right lateral recess narrowing due to facet arthritis and synovial cyst at L4/5      Assessment:   - lumbar spinal stenosis L4-5  -right L5 radiculopathy due to facet arthritis cyst   - cervical spondylosis    Plan:      -Physical therapy -  discussed PT, she would like to hold off as she just had extensive PT for her rotator cuff repair   -Medications - will renew lyrica 100mg qhs and increase as tolerated  -continue baclofen 10mg in am 20mg qhs  Counseled regarding side effects and appropriate administration of medications.    -Diagnostics/Imaging - reviewed MRI lumbar spine   -Injections - referral to try right bilateral L5 TF ALEXANDER    -Lifestyle - Encouraged weight loss   -Education - The patient's diagnosis, prognosis and treatment options were discussed today. All questions were answered. F/U - after ALEXANDER.   Consider surgical referral           [address] [facility]

## 2023-01-10 NOTE — H&P (VIEW-ONLY)
[address]  [address], [address]  [place], [address] [place] [address] Street  Phone: [phone]  Fax: [phone]      Patient: Telly Khan                                                                              MRN: [id number]        YOB: 1965          AGE: 65 y.o. PCP: Adeel Spain MD  Date:  01/10/23    Reason for Consultation: Back Pain      HPI:  Telly Khan is a 65 y.o. female with relevant PMH of HTN, GERD, gastric bypass 2017 who presents with low back pain radiating down R>L leg. She also reports neck pain which radiates into bilateral arms. 12/6/22 she had a right L5 SNRB which gave her about 60% relief for about 2 weeks. After the injection she started to notice pain increased pain down the left leg and was wondering if she can have an injection on her left side. She started lyrica 100mg at night      Neurologic symptoms: +numbness, tingling, bilateral feet. No weakness, bowel or bladder changes. No recent falls      Location: The pain is located in the low back pain , neck   Radiation: The pain does radiate down bilateral legs R>L. Pain Score: Currently: 6/10  At Best: 5/10  At Worst: 10/10   Quality: Pain is of a Burning, Cramping, Stiff, Tingling, Numbness, and Pulling quality. Aggravating: Pain is exacerbated by walking, sitting, standing, and getting up in the morning   Alleviating: The pain is alleviated by  sitting in recliner    Prior Treatments:  Physical therapy: YES completed 8/2022 for right RTC repair  Injections:  12/6/22- right L 5 SNRB 2 weeks 60% relief     Previous Medications: medrol pack , gabapentin   Current Medications: voltaren gel, baclofen 10mg in the am, 20mg at night , norco at night , lyrica 100mg at night   Previous work-up has included:     MRI lumbar spine 10/27/22  L1-2: No significant disc displacement. Preserved disc height and hydration.  Mild hypertrophic facet arthropathy, thickening of ligamentum flavum narrows the posterior thecal space. No significant neural foraminal or central canal  stenosis. L2-3: Mild loss of normal disc hydration. Minimal broad-based disc ridging flattens the ventral thecal space. Moderate hypertrophic facet arthropathy, thickening of ligamentum flavum effaces the posterior thecal space resulting in moderate spinal canal stenosis. Small bilateral facet joint effusions. Mild bilateral neural foraminal stenosis. L3-4: Broad-based disc bulge flattens the ventral thecal space. Moderate bilateral hypertrophic facet arthropathy, thickening of ligament flavum effaces the posterior thecal space resulting in moderate spinal canal stenosis. Mild  bilateral neural foraminal stenosis. L4-5: Broad-based disc bulge flattens the ventral thecal space. Advanced bilateral hypertrophic facet arthropathy, thickening of ligamentum flavum effaces the posterior thecal space resulting in severe spinal canal stenosis. Right 4 mm synovial cyst extends into the right lateral recess, likely resulting in compression of the descending L5 nerve root. Bilateral facet joint effusions. Moderate right and mild left neural foraminal stenosis. L5-S1: Minimal broad-based disc bulge flattens the ventral thecal space. Patent canal and foramina.      Past Medical History:   Past Medical History:   Diagnosis Date    Arthritis     osteoarthritis    Fibroid, uterine     GERD (gastroesophageal reflux disease)     Gout     Headache     Hypertension     Post-menopausal bleeding     Sleep apnea     Uses CPAP      Past Surgical History:   Past Surgical History:   Procedure Laterality Date    COLONOSCOPY N/A 4/5/2021    COLONOSCOPY performed by Magui Grajeda MD at [address] [place] [address]  2010    HX GASTRIC BYPASS  10/2017    HX LAP GASTRIC BYPASS  2017    HX TOTAL LAPAROSCOPIC HYSTERECTOMY  11/23/2021    HX TUBAL LIGATION      I&D ABCESS SIMP Left 11/24/2020    I and D of left back abscess IN ANES [facility] TNDN FSCIA BURSA SHOULDER & AXILLA Right 04/08/2022    Rt Shoulder Arthroscopic rotator cuff repair      SocHx:   Social History     Tobacco Use    Smoking status: Never    Smokeless tobacco: Never   Substance Use Topics    Alcohol use: Yes     Comment: occasionally/socially      FamHx:? Family History   Problem Relation Age of Onset    Diabetes Mother     Hypertension Mother     Elevated Lipids Mother     Diabetes Father     Hypertension Father     Elevated Lipids Father        Current Medications:    Current Outpatient Medications   Medication Sig Dispense Refill    pregabalin (Lyrica) 50 mg capsule Take 50 mg by mouth two (2) times a day. baclofen (LIORESAL) 10 mg tablet TAKE 1 TABLET BY MOUTH THREE TIMES A DAY 90 Tablet 1    zolpidem CR (AMBIEN CR) 12.5 mg tablet Take 12.5 mg by mouth nightly. diclofenac (VOLTAREN) 1 % gel Apply 4 g to affected area four (4) times daily. 500 g 2    estradioL (CLIMARA) 0.1 mg/24 hr APPLY 1 PATCH TRANSDERMAL EVERY 7 DAYS. azelastine (ASTELIN) 137 mcg (0.1 %) nasal spray 1 Spray two (2) times a day. Use in each nostril as directed      FLUoxetine (PROzac) 10 mg capsule Take  by mouth daily. doxepin (SINEquan) 10 mg capsule Take 10 mg by mouth nightly. buPROPion SR (WELLBUTRIN SR) 150 mg SR tablet Take  by mouth two (2) times a day. famotidine (PEPCID) 20 mg tablet Take 40 mg by mouth daily. fluticasone propionate (FLONASE) 50 mcg/actuation nasal spray 2 Sprays daily. cetirizine (ZYRTEC) 10 mg tablet Take 10 mg by mouth daily. polyethylene glycol (MIRALAX) 17 gram/dose powder Take 17 g by mouth daily. ergocalciferol (ERGOCALCIFEROL) 1,250 mcg (50,000 unit) capsule TAKE ONE CAPSULE BY MOUTH WEEKLY FOR 7 DAYS      lisinopril-hydroCHLOROthiazide (PRINZIDE, ZESTORETIC) 20-25 mg per tablet TAKE 1 TABLET BY MOUTH EVERY DAY      therapeutic multivitamin (THERAGRAN) tablet Take 2 Tabs by mouth daily.       acetaminophen (TYLENOL) 500 mg tablet Take  by mouth every six (6) hours as needed for Pain.      montelukast (SINGULAIR) 10 mg tablet Take 10 mg by mouth daily. cpap machine kit by Does Not Apply route. Allergies: Allergies   Allergen Reactions    Augmentin [Amoxicillin-Pot Clavulanate] Diarrhea    Bactrim [Sulfamethoprim Ss] Itching        Review of Systems:   Gen:    Denied fevers, chills, malaise, fatigue, weight changes   Resp: Denied shortness of breath, cough, wheezing   CVS: Denied chest pain, palpitations   : Denied urinary urgency, frequency, incontinence   GI: Denied nausea, vomiting, constipation, diarrhea   Skin: Denied rashes, wounds   Psych: Denied anxiety, depression   Vasc: Denied claudication, ulcers   Hem: Denied easy bruising/bleeding   MSK: See HPI   Neuro: See HPI         Physical Exam     Vital Signs: There were no vitals taken for this visit. General: ??????? Well nourished and well developed female without any acute distress   Psychiatric: ?  Alert and oriented x 3 with normal mood    HEENT: ???????? Atraumatic   Respiratory:   Breathing non-labored and non dyspneic   CV: ???????????????? Peripheral pulses intact, no peripheral edema   Skin: ????????????? No rashes       Neurologic: ?? Sensation: normal and grossly intact thebilateral, upper extremity(s), lower extremity(s)   Strength: 5/5 in the bilateral, upper extremity(s), lower extremity(s)  Reflexes: reveals 2+ symmetric DTRs throughout except 1+ right achilles   Gait: normal   Upper tract signs: Babinski down going, Cerna's negative ?      Cervical full ROM    Musculoskeletal: Lumbar Exam     Inspection:   Alignment: Normal  Atrophy: None       Tenderness to Palpation:   Lumbar paraspinals Positive  Lumbar spinous processes Negative  SI Joint:  Negative  Gluteal:Positive   Greater trochanter: Negative      ROM:   Lumbar ROM: Abnormal pain with extension  Lumbar facet loading: Positive  Hip ROM: No reproduction of pain with movement -seated    Special Tests      Slump test: Positive right          ,  Medical Decision Making:    Images: The imaging results as well as the actual images of the studies below were reviewed, visualized and interpreted by me. Labs: The results below were reviewed. MRI lumbar spine 10/27/22  Lumbar facet arthritis  Severe spinal stenosis L4/5  Severe right lateral recess narrowing due to facet arthritis and synovial cyst at L4/5      Assessment:   - lumbar spinal stenosis L4-5  -right L5 radiculopathy due to facet arthritis cyst   - cervical spondylosis    Plan:      -Physical therapy -  discussed PT, she would like to hold off as she just had extensive PT for her rotator cuff repair   -Medications - will renew lyrica 100mg qhs and increase as tolerated  -continue baclofen 10mg in am 20mg qhs  Counseled regarding side effects and appropriate administration of medications.    -Diagnostics/Imaging - reviewed MRI lumbar spine   -Injections - referral to try right bilateral L5 TF ALEXANDER    -Lifestyle - Encouraged weight loss   -Education - The patient's diagnosis, prognosis and treatment options were discussed today. All questions were answered. F/U - after ALEXANDER.   Consider surgical referral           [address] [facility]

## 2023-01-17 ENCOUNTER — TELEPHONE (OUTPATIENT)
Dept: ORTHOPEDIC SURGERY | Age: 58
End: 2023-01-17

## 2023-01-17 NOTE — TELEPHONE ENCOUNTER
I called Ms. Heidy Singh and left a voice message for her to call me back at my direct office number to set up her Nerve Block injection.   1st call 1/13/2023 at 2:35pm LVM  2nd call 1/17/2023 at 1:26pm LVM

## 2023-01-18 ENCOUNTER — DOCUMENTATION ONLY (OUTPATIENT)
Dept: ORTHOPEDIC SURGERY | Age: 58
End: 2023-01-18

## 2023-01-26 ENCOUNTER — OFFICE VISIT (OUTPATIENT)
Dept: ORTHOPEDIC SURGERY | Age: 58
End: 2023-01-26
Payer: COMMERCIAL

## 2023-01-26 VITALS — TEMPERATURE: 97.6 F | HEIGHT: 62 IN | BODY MASS INDEX: 46.01 KG/M2 | WEIGHT: 250 LBS | RESPIRATION RATE: 16 BRPM

## 2023-01-26 DIAGNOSIS — R60.0 BILATERAL LEG EDEMA: ICD-10-CM

## 2023-01-26 DIAGNOSIS — M17.12 PRIMARY OSTEOARTHRITIS OF LEFT KNEE: ICD-10-CM

## 2023-01-26 DIAGNOSIS — S83.242A ACUTE MEDIAL MENISCUS TEAR OF LEFT KNEE, INITIAL ENCOUNTER: Primary | ICD-10-CM

## 2023-01-26 DIAGNOSIS — M17.11 PRIMARY OSTEOARTHRITIS OF RIGHT KNEE: ICD-10-CM

## 2023-01-26 RX ORDER — TRIAMCINOLONE ACETONIDE 40 MG/ML
40 INJECTION, SUSPENSION INTRA-ARTICULAR; INTRAMUSCULAR ONCE
Status: COMPLETED | OUTPATIENT
Start: 2023-01-26 | End: 2023-01-26

## 2023-01-26 RX ADMIN — TRIAMCINOLONE ACETONIDE 40 MG: 40 INJECTION, SUSPENSION INTRA-ARTICULAR; INTRAMUSCULAR at 11:14

## 2023-01-26 NOTE — PROGRESS NOTES
Karl Laird  1965   Chief Complaint   Patient presents with    Knee Pain     Loc           HISTORY OF PRESENT ILLNESS  Karl Laird is a 65 y.o. female who presents today for reevaluation of bilateral knee pain. Patient rates pain as 4/10 today. She notes   Her left knee has a buckling and locking sensation. Feels like it wants to give way. Today she states right knee is actually worse. Feels the pain is worse at night. Patient denies any fever, chills, chest pain, shortness of breath or calf pain. The remainder of the review of systems is negative. There are no new illness or injuries to report since last seen in the office. No changes in medications, allergies, social or family history. PHYSICAL EXAM:   Visit Vitals  Temp 97.6 °F (36.4 °C) (Temporal)   Resp 16   Ht 5' 2\" (1.575 m)   Wt 250 lb (113.4 kg)   BMI 45.73 kg/m²       The patient is a well-developed, well-nourished female   in no acute distress. The patient is alert and oriented times three. The patient is alert and oriented times three. Mood and affect are normal.  LYMPHATIC: lymph nodes are not enlarged and are within normal limits  SKIN: normal in color and non tender to palpation. There are no bruises or abrasions noted. NEUROLOGICAL: Motor sensory exam is within normal limits. Reflexes are equal bilaterally.  There is normal sensation to pinprick and light touch  MUSCULOSKELETAL:    Examination Left knee Right knee   Skin Intact Intact   Range of motion 5-120 5-120   Effusion + +   Medial joint line tenderness + +   Lateral joint line tenderness - -   Tenderness Pes Bursa - -   Tenderness insertion MCL - -   Tenderness insertion LCL - -   Ladis - -   Patella crepitus + +   Patella grind + +   Lachman - -   Pivot shift - -   Anterior drawer - -   Posterior drawer - -   Varus stress - -   Valgus stress - -   Neurovascular Intact Intact   Calf Swelling and Tenderness to Palpation - -   Faith's Test - -   Hamstring Cord Tightness + +      2+ pitting edema bilateral legs  PROCEDURE: Bilateral knee Injection with Ultrasound Guidance    Indication:Bilateral Knee pain/swelling    After sterile prep, 2 cc of Euflexxa were injected into the left Knee. Intra-articular Ultrasound images captured using [address] Ultrasound machine using a frequency of 10 MHz with a linear transducer and scanned into patient's chart. After sterile prep, 4 cc of Xylocaine and 1 cc of Kenalog were injected into the right Knee. Intra-articular Ultrasound images captured using [address] Ultrasound machine using a frequency of 10 MHz with a linear transducer and scanned into patient's chart. [facility] - [facility]  OFFICE PROCEDURE PROGRESS NOTE        Chart reviewed for the following:  Ly VALENTIN PA, have reviewed the History, Physical and updated the Allergic reactions for [place] performed immediately prior to start of procedure:  Ly VALENTIN PA-C, have performed the following reviews on Mara Kaiser prior to the start of the procedure:            * Patient was identified by name and date of birth   * Agreement on procedure being performed was verified  * Risks and Benefits explained to the patient  * Procedure site verified and marked as necessary  * Patient was positioned for comfort  * Consent was signed and verified     Time: 11:12 AM    Date of procedure: 2023    Procedure performed by:  SHO Angeles    Provider assisted by: (see medication administration)    How tolerated by patient: tolerated the procedure well with no complications    Comments: none        IMAGIN view x-rays of bilateral knees read and reviewed by myself on 10/12/2022 reveal degenerative changes with joint space narrowing in the medial and patellofemoral compartment.   No acute abnormalities    2 view x-rays of the lumbar spine taken in the office on 10/12/2022 read and reviewed by myself reveal spondylitic changes with Retrolisthesis L4-L5. MRI of left knee on 1/4/23 read and reviewed by myself reveals: 1. Combination of horizontal and inner margin radial tearing of the medial  meniscus body and posterior horn. 2. Low-grade sprain of the proximal superficial fibers MCL. 3. Tricompartmental left knee joint chondrosis and osteoarthritis, severe across  the medial tibiofemoral compartment. 4. Moderate size joint effusion with moderate-sized multilocular Baker's cyst.    IMPRESSION:      ICD-10-CM ICD-9-CM    1. Acute medial meniscus tear of left knee, initial encounter  S83.242A 836.0       2. Primary osteoarthritis of left knee  M17.12 715.16       3. Primary osteoarthritis of right knee  M17.11 715.16                PLAN:   1. Patient left knee with degenerative arthritis and medial meniscus tear. Discussed treatment options with the patient including surgery, viscosupplementation, or physical therapy. Patient states she currently cannot do surgery at this time. We will proceed with viscosupplementation for the left knee at this time  Patient with right knee degenerative arthritis acute exacerbation. Discussed treatment options with the patient including a physician directed exercise program, cortisone injections, viscosupplementation. Patient wishes to proceed with cortisone today. We will proceed with viscosupplementation authorization as well for the right knee given degenerative arthritis with joint space narrowing  Risk factors include: bmi>45  2. yes  cortisone injection indicated today   3. Yes Physical/Occupational Therapy indicated today  4. no diagnostic test indicated today:   5. No durable medical equipment indicated today  6. yes referral indicated today [facility] FOR PITTING EDEMA  7. No  medications indicated today:   8.  No Narcotic indicated today     RTC next week for second visco injection     LARISSA [facility]

## 2023-02-01 RX ORDER — BACLOFEN 10 MG/1
TABLET ORAL
Qty: 90 TABLET | Refills: 1 | Status: SHIPPED | OUTPATIENT
Start: 2023-02-01

## 2023-02-02 ENCOUNTER — OFFICE VISIT (OUTPATIENT)
Dept: ORTHOPEDIC SURGERY | Age: 58
End: 2023-02-02
Payer: COMMERCIAL

## 2023-02-02 VITALS — RESPIRATION RATE: 16 BRPM | TEMPERATURE: 97.8 F | HEIGHT: 62 IN | BODY MASS INDEX: 46.01 KG/M2 | WEIGHT: 250 LBS

## 2023-02-02 DIAGNOSIS — S83.241A ACUTE MEDIAL MENISCUS TEAR OF RIGHT KNEE, INITIAL ENCOUNTER: ICD-10-CM

## 2023-02-02 DIAGNOSIS — M17.11 PRIMARY OSTEOARTHRITIS OF RIGHT KNEE: ICD-10-CM

## 2023-02-02 DIAGNOSIS — M17.12 PRIMARY OSTEOARTHRITIS OF LEFT KNEE: Primary | ICD-10-CM

## 2023-02-03 ENCOUNTER — TRANSCRIBE ORDERS (OUTPATIENT)
Facility: HOSPITAL | Age: 58
End: 2023-02-03

## 2023-02-03 DIAGNOSIS — M17.11 PRIMARY OSTEOARTHRITIS OF RIGHT KNEE: Primary | ICD-10-CM

## 2023-02-03 NOTE — PROGRESS NOTES
Rufus Feliz  1965   Chief Complaint   Patient presents with    Injection     Lt knee euflexxa #2         HISTORY OF PRESENT ILLNESS  Rufus Feliz is a 65 y.o. female who presents today for reevaluation of bilateral knee pain. She is here for her second injection of euflexxa in her left knee. Her right knee cont to be a source of discomfort. Describes a catching, locking sensation. She is not improving with cortisone injection or her physician directed exercise program. He states she is noting more swelling now. Pain is a 8/10. Jailene Rangel Patient denies any fever, chills, chest pain, shortness of breath or calf pain. The remainder of the review of systems is negative. There are no new illness or injuries to report since last seen in the office. No changes in medications, allergies, social or family history. PHYSICAL EXAM:   Visit Vitals  Temp 97.8 °F (36.6 °C) (Temporal)   Resp 16   Ht 5' 2\" (1.575 m)   Wt 250 lb (113.4 kg)   BMI 45.73 kg/m²     The patient is a well-developed, well-nourished female   in no acute distress. The patient is alert and oriented times three. The patient is alert and oriented times three. Mood and affect are normal.  LYMPHATIC: lymph nodes are not enlarged and are within normal limits  SKIN: normal in color and non tender to palpation. There are no bruises or abrasions noted. NEUROLOGICAL: Motor sensory exam is within normal limits. Reflexes are equal bilaterally.  There is normal sensation to pinprick and light touch  MUSCULOSKELETAL:    Examination Left knee Right knee   Skin Intact Intact   Range of motion 0-120    Effusion - +   Medial joint line tenderness + +   Lateral joint line tenderness - -   Tenderness Pes Bursa - -   Tenderness insertion MCL - -   Tenderness insertion LCL - -   Ladis - +   Patella crepitus - -   Patella grind - -   Lachman - -   Pivot shift - -   Anterior drawer - -   Posterior drawer - -   Varus stress - -   Valgus stress - - Neurovascular Intact Intact   Calf Swelling and Tenderness to Palpation - -   Faith's Test - -   Hamstring Cord Tightness - -            PROCEDURE: Left knee Injection with Ultrasound Guidance    Indication:Left Knee pain/swelling    After sterile prep, 2 cc of Euflexxa were injected into the left Knee. Intra-articular Ultrasound images captured using [address] Ultrasound machine using a frequency of 10 MHz with a linear transducer and scanned into patient's chart. [facility]  OFFICE PROCEDURE PROGRESS NOTE        Chart reviewed for the following:  Ally VALENTIN PA, have reviewed the History, Physical and updated the Allergic reactions for Steffany performed immediately prior to start of procedure:  Ally VALENTIN PA-C, have performed the following reviews on Primitivo Galloway prior to the start of the procedure:            * Patient was identified by name and date of birth   * Agreement on procedure being performed was verified  * Risks and Benefits explained to the patient  * Procedure site verified and marked as necessary  * Patient was positioned for comfort  * Consent was signed and verified     Time: 7:26 AM    Date of procedure: 2/3/2023    Procedure performed by:  SHO Dior    Provider assisted by: (see medication administration)    How tolerated by patient: tolerated the procedure well with no complications    Comments: none      IMAGING: previous xrays of right knee read and reviewed by myself reveal degenerative arthritis with joint space narrowing. No acute abnormalities    IMPRESSION:      ICD-10-CM ICD-9-CM    1. Primary osteoarthritis of left knee  M17.12 715.16 ARTHROCENTESIS ASPIR&/INJ MAJOR JT/BURSA W/US      sodium hyaluronate (SUPARTZ FX/EUFLEXXA/HYALGAN) 10 mg/mL injection syrg 20 mg      2. Primary osteoarthritis of right knee  M17.11 715.16 REFERRAL TO ORTHO INJECTION      MRI KNEE RT WO CONT      3.  Acute medial meniscus tear of right knee, initial encounter  S83.241A 836.0            PLAN:   1. Patient left knee with degen arthritis - 2nd euflexxa injection today  Patient right knee with degen arthritis with concerns for medial meniscus tear. Discussed treatment options for the patient. Will order MRI r/o MMT today. Will also auth visco for right knee given degen arthritis with joint space narrowing  Risk factors include: BMI>45  2. no cortisone injection indicated today   3. No Physical/Occupational Therapy indicated today  4. Yes diagnostic test indicated today:   5. No durable medical equipment indicated today  6. No referral indicated today   7. No medications indicated today:   8.  No Narcotic indicated today     RTC 1 week for 3rd injection       Valeria Owen PA-C  [facility]

## 2023-02-07 ENCOUNTER — HOSPITAL ENCOUNTER (OUTPATIENT)
Age: 58
Setting detail: OUTPATIENT SURGERY
Discharge: HOME | End: 2023-02-07
Attending: PHYSICAL MEDICINE & REHABILITATION | Admitting: PHYSICAL MEDICINE & REHABILITATION
Payer: COMMERCIAL

## 2023-02-07 ENCOUNTER — APPOINTMENT (OUTPATIENT)
Dept: GENERAL RADIOLOGY | Age: 58
End: 2023-02-07
Attending: PHYSICAL MEDICINE & REHABILITATION
Payer: COMMERCIAL

## 2023-02-07 VITALS
RESPIRATION RATE: 14 BRPM | DIASTOLIC BLOOD PRESSURE: 69 MMHG | HEART RATE: 66 BPM | TEMPERATURE: 98.4 F | OXYGEN SATURATION: 95 % | SYSTOLIC BLOOD PRESSURE: 107 MMHG

## 2023-02-07 PROCEDURE — 2709999900 HC NON-CHARGEABLE SUPPLY: Performed by: PHYSICAL MEDICINE & REHABILITATION

## 2023-02-07 PROCEDURE — 77030003672 HC NDL SPN HALY -A: Performed by: PHYSICAL MEDICINE & REHABILITATION

## 2023-02-07 PROCEDURE — 74011250636 HC RX REV CODE- 250/636: Performed by: PHYSICAL MEDICINE & REHABILITATION

## 2023-02-07 PROCEDURE — 74011000636 HC RX REV CODE- 636: Performed by: PHYSICAL MEDICINE & REHABILITATION

## 2023-02-07 PROCEDURE — 77030039433 HC TY MYLEOGRAM BD -B: Performed by: PHYSICAL MEDICINE & REHABILITATION

## 2023-02-07 PROCEDURE — 76010000009 HC PAIN MGT 0 TO 30 MIN PROC: Performed by: PHYSICAL MEDICINE & REHABILITATION

## 2023-02-07 PROCEDURE — 74011250637 HC RX REV CODE- 250/637: Performed by: PHYSICAL MEDICINE & REHABILITATION

## 2023-02-07 PROCEDURE — 74011000250 HC RX REV CODE- 250: Performed by: PHYSICAL MEDICINE & REHABILITATION

## 2023-02-07 RX ORDER — LIDOCAINE HYDROCHLORIDE 10 MG/ML
INJECTION, SOLUTION EPIDURAL; INFILTRATION; INTRACAUDAL; PERINEURAL AS NEEDED
Status: DISCONTINUED | OUTPATIENT
Start: 2023-02-07 | End: 2023-02-07 | Stop reason: HOSPADM

## 2023-02-07 RX ORDER — DEXAMETHASONE SODIUM PHOSPHATE 100 MG/10ML
INJECTION INTRAMUSCULAR; INTRAVENOUS AS NEEDED
Status: DISCONTINUED | OUTPATIENT
Start: 2023-02-07 | End: 2023-02-07 | Stop reason: HOSPADM

## 2023-02-07 RX ORDER — DIAZEPAM 5 MG/1
5-20 TABLET ORAL ONCE
Status: COMPLETED | OUTPATIENT
Start: 2023-02-07 | End: 2023-02-07

## 2023-02-07 RX ADMIN — DIAZEPAM 10 MG: 5 TABLET ORAL at 09:18

## 2023-02-07 NOTE — INTERVAL H&P NOTE
Update History & Physical    The Patient's History and Physical of January 10,   2023 was reviewed. There was no change. The surgical site was confirmed by the patient and me. Plan:  The risk, benefits, expected outcome, and alternative to the recommended procedure have been discussed with the patient. Patient understands and wants to proceed with the procedure.     Electronically signed by Naveen Ramírez MD on 2/7/2023 at 9:28 AM

## 2023-02-07 NOTE — PERIOP NOTES
Patient verbalized understanding of discharge instructions and verbally consented to [facility] Patient Consent. Signature pad not working.

## 2023-02-07 NOTE — PROCEDURES
SELECTIVE NERVE ROOT BLOCK PROCEDURE NOTE      Patient Name: Jason Cruz  Date of Procedure: February 7, 2023  Preoperative Diagnosis:  Lumbar Radicular Pain  Post Operative Diagnosis:  Lumbar Radicular Pain  Location:  [facility]    Procedure :    bilateral  L5 Selective Nerve Root Block      Consent:  Informed consent was obtained prior to the procedure. The patient was given the opportunity to ask questions regarding the procedure and its associated risks. In addition to the potential risks associated with the procedure itself, the patient was informed both verbally and in writing of the potential side effects of the use of glucocorticoid. The patient appeared to comprehend the informed consent and desired to have the procedure performed. Procedure: The patient was placed in the prone position on the fluoroscopy table and the back was prepped and draped in the usual sterile manner. The exact spinal level was  identified using fluoroscopy, and Lidocaine 1 % was injected locally, a 22 gauge spinal needle was passed to the transverse process. The depth was noted and the needle redirected to pass inferior and approximately one cm anterior to the transverse process. YES  1 cc of Isovue M-200 was used to verify positioning in the epidural and paravertebral space and outlined the course of the spinal nerve into the epidural space. The same procedure was repeated at each spinal level indicated above. No vascular uptake was identified. A total of 10 mg of preservative free Dexamethasone and 1 cc of Lidocaine/site was slowly injected. The patient tolerated the procedure well. The injection area was cleaned and bandaids applied. Not excessive bleeding was noted. Patient dressed and discharged to home with instructions. Discussion: The patient tolerated the procedure well. Patient reported daphnie-procedural pain on Visual Analog Scale:  pre-5; post-1. Genny Etienne MD  February 7, 2023

## 2023-02-09 ENCOUNTER — OFFICE VISIT (OUTPATIENT)
Dept: ORTHOPEDIC SURGERY | Age: 58
End: 2023-02-09
Payer: COMMERCIAL

## 2023-02-09 VITALS — BODY MASS INDEX: 46.01 KG/M2 | WEIGHT: 250 LBS | HEIGHT: 62 IN

## 2023-02-09 DIAGNOSIS — M17.11 PRIMARY OSTEOARTHRITIS OF RIGHT KNEE: ICD-10-CM

## 2023-02-09 DIAGNOSIS — M17.12 PRIMARY OSTEOARTHRITIS OF LEFT KNEE: Primary | ICD-10-CM

## 2023-02-09 NOTE — PROGRESS NOTES
Patient: Ashia Aldridge                MRN: [id number]       SSN: [id number]  YOB: 1965        AGE: 65 y.o. SEX: female  Body mass index is 45.73 kg/m². PCP: Eula Yang MD  02/09/23    Chief Complaint   Patient presents with    Injection     Lt knee euflexxa #3       HISTORY:  Ashia Aldridge is a 65 y.o. female who is seen for reevaluation of Left knee here for 3rd and final injection of euflexxa. PROCEDURE:  Left knee Injection with Ultrasound Guidance    Indication:Left Knee pain/swelling    After sterile prep, 2 cc of Euflexxa were injected into the left Knee. Intra-articular Ultrasound images captured using [address] [place] [address] Loop Ultrasound machine using a frequency of 10 MHz with a linear transducer and scanned into patient's chart. [address] [facility]  OFFICE PROCEDURE PROGRESS NOTE        Chart reviewed for the following:   Laura VALENTIN PA-C, have reviewed the History, Physical and updated the Allergic reactions for [place] performed immediately prior to start of procedure:   Laura VALENTIN PA-C, have performed the following reviews on Ashia Aldridge prior to the start of the procedure:            * Patient was identified by name and date of birth   * Agreement on procedure being performed was verified  * Risks and Benefits explained to the patient  * Procedure site verified and marked as necessary  * Patient was positioned for comfort  * Consent was signed and verified     Time: 11:11 AM       Date of procedure: 2/9/2023    Procedure performed by:  SHO Garrett    Provider assisted by: None     How tolerated by patient: tolerated the procedure well with no complications    Comments: none    IMPRESSION:     ICD-10-CM ICD-9-CM    1. Primary osteoarthritis of left knee  M17.12 715.16            PLAN: Ms. Deanna Juárez has completed her Euflexxa injection series. she will return as needed.       Laura Mcfarland L[facility]

## 2023-02-13 DIAGNOSIS — R60.0 BILATERAL LEG EDEMA: Primary | ICD-10-CM

## 2023-03-02 RX ORDER — BACLOFEN 10 MG/1
TABLET ORAL
Qty: 90 TABLET | Refills: 1 | Status: SHIPPED | OUTPATIENT
Start: 2023-03-02

## 2023-03-05 DIAGNOSIS — M54.10 RADICULOPATHY, SITE UNSPECIFIED: ICD-10-CM

## 2023-03-06 RX ORDER — PREGABALIN 100 MG/1
CAPSULE ORAL
Qty: 30 CAPSULE | Refills: 0 | Status: SHIPPED | OUTPATIENT
Start: 2023-03-06 | End: 2023-04-05

## 2023-03-08 ENCOUNTER — OFFICE VISIT (OUTPATIENT)
Age: 58
End: 2023-03-08
Payer: COMMERCIAL

## 2023-03-08 VITALS
HEART RATE: 77 BPM | BODY MASS INDEX: 46.01 KG/M2 | DIASTOLIC BLOOD PRESSURE: 82 MMHG | SYSTOLIC BLOOD PRESSURE: 132 MMHG | OXYGEN SATURATION: 99 % | HEIGHT: 62 IN | WEIGHT: 250 LBS

## 2023-03-08 DIAGNOSIS — M79.89 PAIN AND SWELLING OF LOWER EXTREMITY, UNSPECIFIED LATERALITY: Primary | ICD-10-CM

## 2023-03-08 DIAGNOSIS — M79.606 PAIN AND SWELLING OF LOWER EXTREMITY, UNSPECIFIED LATERALITY: Primary | ICD-10-CM

## 2023-03-08 PROCEDURE — 99204 OFFICE O/P NEW MOD 45 MIN: CPT | Performed by: PHYSICIAN ASSISTANT

## 2023-03-08 PROCEDURE — 3017F COLORECTAL CA SCREEN DOC REV: CPT | Performed by: PHYSICIAN ASSISTANT

## 2023-03-08 PROCEDURE — 1036F TOBACCO NON-USER: CPT | Performed by: PHYSICIAN ASSISTANT

## 2023-03-08 PROCEDURE — 3079F DIAST BP 80-89 MM HG: CPT | Performed by: PHYSICIAN ASSISTANT

## 2023-03-08 PROCEDURE — G8427 DOCREV CUR MEDS BY ELIG CLIN: HCPCS | Performed by: PHYSICIAN ASSISTANT

## 2023-03-08 PROCEDURE — G8484 FLU IMMUNIZE NO ADMIN: HCPCS | Performed by: PHYSICIAN ASSISTANT

## 2023-03-08 PROCEDURE — G8419 CALC BMI OUT NRM PARAM NOF/U: HCPCS | Performed by: PHYSICIAN ASSISTANT

## 2023-03-08 PROCEDURE — 3075F SYST BP GE 130 - 139MM HG: CPT | Performed by: PHYSICIAN ASSISTANT

## 2023-03-08 ASSESSMENT — PATIENT HEALTH QUESTIONNAIRE - PHQ9
SUM OF ALL RESPONSES TO PHQ QUESTIONS 1-9: 0
2. FEELING DOWN, DEPRESSED OR HOPELESS: 0
SUM OF ALL RESPONSES TO PHQ9 QUESTIONS 1 & 2: 0
SUM OF ALL RESPONSES TO PHQ QUESTIONS 1-9: 0
1. LITTLE INTEREST OR PLEASURE IN DOING THINGS: 0

## 2023-03-08 NOTE — PROGRESS NOTES
Chief Complaint   Patient presents with    New Patient    Leg Pain     Swelling          HPI: Larue Duane is a 65 y.o. female with a h/o HTN, gastric bypass and obesity who presents complaining of bilateral lower extremity edema. The edema is located in the right leg and left leg. The edema has been off and on. Onset of symptoms was 1 year ago, and has been unchanged since that time. The edema is present intermittently. The patient has not had a previous DVT. The swelling has been aggravated by dependency of involved area, relieved by diuretics and LE elevation. She states since her gastric bypass she has gained some weight which has contributed to the swelling. She has worn GCS in the past with some relief but has not worn since her hip surgery. Discussed possible etiologies for LE edema and offered her work up for venous insufficiency, she declined work up stating she is not interested in any procedures at this time and would prefer conservative management.        IMAGING:    none      Past Medical History:   Diagnosis Date    Arthritis     osteoarthritis    Fibroid, uterine     GERD (gastroesophageal reflux disease)     Gout     Headache     Hypertension     Post-menopausal bleeding     Sleep apnea     Uses CPAP       Past Surgical History:   Procedure Laterality Date    ANESTH,SURGERY OF SHOULDER Right 04/08/2022    Rt Shoulder Arthroscopic rotator cuff repair    BREAST REDUCTION SURGERY  2010    COLONOSCOPY N/A 4/5/2021    COLONOSCOPY performed by Cisco Cleary MD at [address]  10/2017    GASTRIC BYPASS SURGERY  2017    HYSTERECTOMY (CERVIX STATUS UNKNOWN)  11/23/2021    I&D ABCESS SIMP Left 11/24/2020    I and D of left back abscess    TUBAL LIGATION         Allergies   Allergen Reactions    Sulfamethoxazole-Trimethoprim Itching    Amoxicillin-Pot Clavulanate Diarrhea     Severe diarrhea       Current Outpatient Medications on File Prior to Visit   Medication Sig Dispense Refill    pregabalin (LYRICA) 100 MG capsule TAKE 1 CAPSULE BY MOUTH NIGHTLY FOR 30 DAYS. MAX DAILY AMOUNT: 100 MG. 30 capsule 0    baclofen (LIORESAL) 10 MG tablet TAKE 1 TABLET BY MOUTH THREE TIMES A DAY 90 tablet 1     No current facility-administered medications on file prior to visit. Social History     Tobacco Use    Smoking status: Never    Smokeless tobacco: Never   Substance Use Topics    Alcohol use: Yes       Family History   Problem Relation Age of Onset    Diabetes Father     Elevated Lipids Father     Hypertension Father     Diabetes Mother     Hypertension Mother     Elevated Lipids Mother        [unfilled]      /82 (Site: Right Upper Arm, Position: Sitting)   Pulse 77   Ht 5' 2\" (1.575 m)   Wt 250 lb (113.4 kg)   SpO2 99%   BMI 45.73 kg/m²     PHYSICAL EXAMINATION:  GEN: alert, oriented, affect appropriate  HEENT:   PERRLA, EOMI  NECK: supple with no masses noted   CARDIAC: normal and regular rate and rhythm   PULMONARY: clear to auscultation and effort normal  ABDOMINAL: soft, nontender, nondistended and no pulsatile abdominal mass appreciated  EXTREMITIES:   No ulcer, tissue loss or gangrene to lower extremities  BLE 1+ edema     ASSESSMENT/PLAN:      1. BLE edema  2. Prescription for 20-30mmHG knee high GCS given. Keep BLE elevated when nonambulatory. 3. She would like to try conservative measures at this time, follow up prn or if symptoms worsen. Thank you for allowing me to participate in the care of this patient.      Krista Myers PA-C  Vascular Physician Assistant

## 2023-03-08 NOTE — PROGRESS NOTES
1. Have you been to the ER, urgent care clinic since your last visit? No     Hospitalized since your last visit? No     2. Have you seen or consulted any other health care providers outside of the [address] [place] [address] since your last visit? Include any pap smears or colon screening.   No

## 2023-03-14 ENCOUNTER — OFFICE VISIT (OUTPATIENT)
Age: 58
End: 2023-03-14
Payer: COMMERCIAL

## 2023-03-14 VITALS — BODY MASS INDEX: 46.01 KG/M2 | HEIGHT: 62 IN | WEIGHT: 250 LBS

## 2023-03-14 DIAGNOSIS — M43.16 SPONDYLOLISTHESIS OF LUMBAR REGION: Primary | ICD-10-CM

## 2023-03-14 DIAGNOSIS — M48.061 SPINAL STENOSIS, LUMBAR REGION WITHOUT NEUROGENIC CLAUDICATION: ICD-10-CM

## 2023-03-14 PROCEDURE — G8427 DOCREV CUR MEDS BY ELIG CLIN: HCPCS | Performed by: PHYSICAL MEDICINE & REHABILITATION

## 2023-03-14 PROCEDURE — 99214 OFFICE O/P EST MOD 30 MIN: CPT | Performed by: PHYSICAL MEDICINE & REHABILITATION

## 2023-03-14 PROCEDURE — 1036F TOBACCO NON-USER: CPT | Performed by: PHYSICAL MEDICINE & REHABILITATION

## 2023-03-14 PROCEDURE — G8484 FLU IMMUNIZE NO ADMIN: HCPCS | Performed by: PHYSICAL MEDICINE & REHABILITATION

## 2023-03-14 PROCEDURE — G8417 CALC BMI ABV UP PARAM F/U: HCPCS | Performed by: PHYSICAL MEDICINE & REHABILITATION

## 2023-03-14 PROCEDURE — 3017F COLORECTAL CA SCREEN DOC REV: CPT | Performed by: PHYSICAL MEDICINE & REHABILITATION

## 2023-03-14 RX ORDER — DOXEPIN HYDROCHLORIDE 10 MG/1
CAPSULE ORAL
COMMUNITY
Start: 2023-03-06

## 2023-03-14 RX ORDER — LISINOPRIL AND HYDROCHLOROTHIAZIDE 25; 20 MG/1; MG/1
TABLET ORAL
COMMUNITY
Start: 2023-03-10

## 2023-03-14 RX ORDER — MONTELUKAST SODIUM 10 MG/1
TABLET ORAL
COMMUNITY
Start: 2023-03-05

## 2023-03-14 RX ORDER — AZELASTINE HYDROCHLORIDE 137 UG/1
SPRAY, METERED NASAL
COMMUNITY
Start: 2023-02-15

## 2023-03-14 RX ORDER — FLUOXETINE 10 MG/1
CAPSULE ORAL
COMMUNITY
Start: 2023-02-09

## 2023-03-14 RX ORDER — PREGABALIN 100 MG/1
100 CAPSULE ORAL 2 TIMES DAILY
Qty: 60 CAPSULE | Refills: 0 | Status: SHIPPED | OUTPATIENT
Start: 2023-03-14 | End: 2023-04-13

## 2023-03-14 RX ORDER — BUPROPION HYDROCHLORIDE 150 MG/1
TABLET, EXTENDED RELEASE ORAL
COMMUNITY
Start: 2023-03-05

## 2023-03-14 RX ORDER — ZOLPIDEM TARTRATE 12.5 MG/1
TABLET, FILM COATED, EXTENDED RELEASE ORAL
COMMUNITY
Start: 2023-02-26

## 2023-03-16 ENCOUNTER — HOSPITAL ENCOUNTER (OUTPATIENT)
Facility: HOSPITAL | Age: 58
Discharge: HOME | End: 2023-03-16
Payer: COMMERCIAL

## 2023-03-16 DIAGNOSIS — M17.11 PRIMARY OSTEOARTHRITIS OF RIGHT KNEE: ICD-10-CM

## 2023-03-16 PROCEDURE — 73721 MRI JNT OF LWR EXTRE W/O DYE: CPT

## 2023-03-27 ENCOUNTER — OFFICE VISIT (OUTPATIENT)
Age: 58
End: 2023-03-27
Payer: COMMERCIAL

## 2023-03-27 VITALS — BODY MASS INDEX: 46.01 KG/M2 | WEIGHT: 250 LBS | HEIGHT: 62 IN

## 2023-03-27 DIAGNOSIS — M17.11 UNILATERAL PRIMARY OSTEOARTHRITIS, RIGHT KNEE: Primary | ICD-10-CM

## 2023-03-27 DIAGNOSIS — M17.12 UNILATERAL PRIMARY OSTEOARTHRITIS, LEFT KNEE: ICD-10-CM

## 2023-03-27 PROCEDURE — G8484 FLU IMMUNIZE NO ADMIN: HCPCS | Performed by: PHYSICIAN ASSISTANT

## 2023-03-27 PROCEDURE — 3017F COLORECTAL CA SCREEN DOC REV: CPT | Performed by: PHYSICIAN ASSISTANT

## 2023-03-27 PROCEDURE — 20611 DRAIN/INJ JOINT/BURSA W/US: CPT | Performed by: PHYSICIAN ASSISTANT

## 2023-03-27 PROCEDURE — G8427 DOCREV CUR MEDS BY ELIG CLIN: HCPCS | Performed by: PHYSICIAN ASSISTANT

## 2023-03-27 PROCEDURE — G8417 CALC BMI ABV UP PARAM F/U: HCPCS | Performed by: PHYSICIAN ASSISTANT

## 2023-03-27 PROCEDURE — 1036F TOBACCO NON-USER: CPT | Performed by: PHYSICIAN ASSISTANT

## 2023-03-27 PROCEDURE — 99213 OFFICE O/P EST LOW 20 MIN: CPT | Performed by: PHYSICIAN ASSISTANT

## 2023-03-27 RX ORDER — TRIAMCINOLONE ACETONIDE 40 MG/ML
40 INJECTION, SUSPENSION INTRA-ARTICULAR; INTRAMUSCULAR ONCE
Status: COMPLETED | OUTPATIENT
Start: 2023-03-27 | End: 2023-03-27

## 2023-03-27 RX ADMIN — TRIAMCINOLONE ACETONIDE 40 MG: 40 INJECTION, SUSPENSION INTRA-ARTICULAR; INTRAMUSCULAR at 13:48

## 2023-03-27 RX ADMIN — TRIAMCINOLONE ACETONIDE 40 MG: 40 INJECTION, SUSPENSION INTRA-ARTICULAR; INTRAMUSCULAR at 14:47

## 2023-03-27 NOTE — PROGRESS NOTES
triamcinolone acetonide (KENALOG-40) injection 40 mg      2. Unilateral primary osteoarthritis, left knee  M17.12 AMB POC US DRAIN/INJECT LARGE JOINT/BURSA     triamcinolone acetonide (KENALOG-40) injection 40 mg     triamcinolone acetonide (KENALOG-40) injection 40 mg           PLAN:   1. Patient with MRI documented degenerative changes in the right knee and degenerative arthritis in the left knee acute exacerbation. Discussed treatment options with the patient. For the right knee we have not done viscosupplementation we are still working on obtaining authorization for this. Given the swelling today we will inject with cortisone today under ultrasound guidance. We will also refer her to either Dr. Andrew Renteria or Dr. Anat Joiner for talk of alternative treatment for her knees given the worsening of her discomfort  Risk factors include: BMI>45  2. Yes cortisone injection indicated today   3. No Physical/Occupational Therapy indicated today  4. No diagnostic test indicated today:   5. No durable medical equipment indicated today  6. No referral indicated today   7. No medications indicated today:   8.  No Narcotic indicated today   RTC for [address], PA-C  [facility]

## 2023-04-03 RX ORDER — BACLOFEN 10 MG/1
TABLET ORAL
Qty: 90 TABLET | Refills: 1 | Status: SHIPPED | OUTPATIENT
Start: 2023-04-03

## 2023-04-18 ENCOUNTER — OFFICE VISIT (OUTPATIENT)
Age: 58
End: 2023-04-18

## 2023-04-18 ENCOUNTER — HOSPITAL ENCOUNTER (OUTPATIENT)
Facility: HOSPITAL | Age: 58
Discharge: HOME | End: 2023-04-21
Payer: COMMERCIAL

## 2023-04-18 VITALS
TEMPERATURE: 98.1 F | DIASTOLIC BLOOD PRESSURE: 78 MMHG | SYSTOLIC BLOOD PRESSURE: 125 MMHG | RESPIRATION RATE: 16 BRPM | HEART RATE: 67 BPM | OXYGEN SATURATION: 95 %

## 2023-04-18 VITALS — HEIGHT: 62 IN | BODY MASS INDEX: 45.82 KG/M2 | WEIGHT: 249 LBS

## 2023-04-18 DIAGNOSIS — G95.9 DISEASE OF SPINAL CORD (HCC): ICD-10-CM

## 2023-04-18 DIAGNOSIS — M43.10 ACQUIRED SPONDYLOLISTHESIS: ICD-10-CM

## 2023-04-18 DIAGNOSIS — M17.11 UNILATERAL PRIMARY OSTEOARTHRITIS, RIGHT KNEE: Primary | ICD-10-CM

## 2023-04-18 PROCEDURE — 6370000000 HC RX 637 (ALT 250 FOR IP): Performed by: PHYSICAL MEDICINE & REHABILITATION

## 2023-04-18 PROCEDURE — 2500000003 HC RX 250 WO HCPCS: Performed by: PHYSICAL MEDICINE & REHABILITATION

## 2023-04-18 PROCEDURE — 64483 NJX AA&/STRD TFRM EPI L/S 1: CPT

## 2023-04-18 PROCEDURE — 6360000002 HC RX W HCPCS: Performed by: PHYSICAL MEDICINE & REHABILITATION

## 2023-04-18 PROCEDURE — 6360000004 HC RX CONTRAST MEDICATION: Performed by: PHYSICAL MEDICINE & REHABILITATION

## 2023-04-18 PROCEDURE — 72040 X-RAY EXAM NECK SPINE 2-3 VW: CPT

## 2023-04-18 PROCEDURE — 72110 X-RAY EXAM L-2 SPINE 4/>VWS: CPT

## 2023-04-18 RX ORDER — HYALURONATE SODIUM 10 MG/ML
20 SYRINGE (ML) INTRAARTICULAR ONCE
Status: COMPLETED | OUTPATIENT
Start: 2023-04-18 | End: 2023-04-18

## 2023-04-18 RX ORDER — LIDOCAINE HYDROCHLORIDE 10 MG/ML
30 INJECTION, SOLUTION EPIDURAL; INFILTRATION; INTRACAUDAL; PERINEURAL ONCE
Status: COMPLETED | OUTPATIENT
Start: 2023-04-18 | End: 2023-04-18

## 2023-04-18 RX ORDER — DIAZEPAM 5 MG/1
2.5 TABLET ORAL ONCE
Status: COMPLETED | OUTPATIENT
Start: 2023-04-18 | End: 2023-04-18

## 2023-04-18 RX ORDER — DEXAMETHASONE SODIUM PHOSPHATE 10 MG/ML
10 INJECTION, SOLUTION INTRAMUSCULAR; INTRAVENOUS ONCE
Status: COMPLETED | OUTPATIENT
Start: 2023-04-18 | End: 2023-04-18

## 2023-04-18 RX ORDER — DIAZEPAM 5 MG/1
5 TABLET ORAL ONCE
Status: COMPLETED | OUTPATIENT
Start: 2023-04-18 | End: 2023-04-18

## 2023-04-18 RX ORDER — LIDOCAINE HYDROCHLORIDE 20 MG/ML
5 INJECTION, SOLUTION EPIDURAL; INFILTRATION; INTRACAUDAL; PERINEURAL ONCE
Status: DISCONTINUED | OUTPATIENT
Start: 2023-04-18 | End: 2023-04-22 | Stop reason: HOSPADM

## 2023-04-18 RX ORDER — DIAZEPAM 5 MG/1
10 TABLET ORAL ONCE
Status: COMPLETED | OUTPATIENT
Start: 2023-04-18 | End: 2023-04-18

## 2023-04-18 RX ADMIN — IOPAMIDOL 2 ML: 408 INJECTION, SOLUTION INTRATHECAL at 10:03

## 2023-04-18 RX ADMIN — DIAZEPAM 10 MG: 5 TABLET ORAL at 09:06

## 2023-04-18 RX ADMIN — Medication 20 MG: at 11:12

## 2023-04-18 RX ADMIN — LIDOCAINE HYDROCHLORIDE 15 ML: 10 INJECTION, SOLUTION EPIDURAL; INFILTRATION; INTRACAUDAL; PERINEURAL at 09:58

## 2023-04-18 RX ADMIN — DEXAMETHASONE SODIUM PHOSPHATE 10 MG: 10 INJECTION, SOLUTION INTRAMUSCULAR; INTRAVENOUS at 10:05

## 2023-04-18 ASSESSMENT — PAIN SCALES - GENERAL: PAINLEVEL_OUTOF10: 5

## 2023-04-18 ASSESSMENT — PAIN - FUNCTIONAL ASSESSMENT: PAIN_FUNCTIONAL_ASSESSMENT: 0-10

## 2023-04-18 NOTE — INTERVAL H&P NOTE
Update History & Physical    The patient's History and Physical of March 14, 2023 was reviewed. There was no change. The surgical site was confirmed by the patient and me. Plan: The risks, benefits, expected outcome, and alternative to the recommended procedure have been discussed with the patient. Patient understands and wants to proceed with the procedure.      Electronically signed by Carmen Bennett MD on 4/18/2023 at 9:51 AM

## 2023-04-18 NOTE — PROGRESS NOTES
Patient: Cody Uribe                MRN: [id number]       SSN: [id number]  YOB: 1965        AGE: 65 y.o. SEX: female  Body mass index is 45.54 kg/m². PCP: Bo Dye MD  04/18/23    Chief Complaint   Patient presents with    Injections     Rt knee euflexxa #1       HISTORY:  Cody Uribe is a 65 y.o. female who is seen for reevaluation of Right knee here for 1st injection of euflexxa    PROCEDURE: Right  knee Injection with Ultrasound Guidance    Indication:Right knee pain/swelling    After sterile prep, 2 cc of euflexxa were injected into the Right Knee. Intra-articular Ultrasound images captured using [address] Ultrasound machine using a frequency of 10 MHz with a linear transducer and scanned into patient's chart. OFFICE PROCEDURE PROGRESS NOTE        Chart reviewed for the following:   Ashley GUERIN PA-C, have reviewed the History, Physical and updated the Allergic reactions for [place] performed immediately prior to start of procedure:   Ashley GUERIN PA-C, have performed the following reviews on Ross Jojo prior to the start of the procedure:            * Patient was identified by name and date of birth   * Agreement on procedure being performed was verified  * Risks and Benefits explained to the patient  * Procedure site verified and marked as necessary  * Patient was positioned for comfort  * Consent was signed and verified     Time: 11:08 AM       Date of procedure: 4/18/2023    Procedure performed by:  KARI Merino    Provider assisted by: None     How tolerated by patient: tolerated the procedure well with no complications    Comments: none    IMPRESSION:     ICD-10-CM    1.  Unilateral primary osteoarthritis, right knee  M17.11 AMB POC US DRAIN/INJECT LARGE JOINT/BURSA     sodium hyaluronate (EUFLEXXA, HYALGAN) injection 20 mg           PLAN:  Ms. Karina García will return in one week for her second euflexxa

## 2023-04-18 NOTE — PERIOP NOTE
Patient verbalized understanding of discharge instructions and verbally consented to [facility] Patient Consent. Signature pad not working.

## 2023-04-18 NOTE — H&P
Allergies   Allergen Reactions    Sulfamethoxazole-Trimethoprim Itching    Amoxicillin-Pot Clavulanate Diarrhea       Severe diarrhea         Physical Exam       Vital Signs: There were no vitals taken for this visit. General: ??????? Well nourished and well developed female without any acute distress    Psychiatric: ?  Alert and oriented x 3 with normal mood     HEENT: ???????? Atraumatic    Respiratory:   Breathing non-labored and non dyspneic    CV: ???????????????? Peripheral pulses intact, no peripheral edema    Skin: ????????????? No rashes          Neurologic: ?? Sensation: normal and grossly intact thebilateral, upper extremity(s), lower extremity(s)    Strength: 5/5 in the bilateral, upper extremity(s), lower extremity(s)   Reflexes: reveals 2+ symmetric DTRs throughout except 1+ right achilles    Gait: normal    Upper tract signs: Babinski down going, Darnell's negative ? Cervical full ROM      Musculoskeletal: Lumbar Exam       Inspection:    Alignment: Normal   Atrophy: None          Tenderness to Palpation:    Lumbar paraspinals Positive   Lumbar spinous processes Negative   SI Joint:  Negative   Gluteal:Positive    Greater trochanter: Negative         ROM:    Lumbar ROM: Abnormal pain with extension   Lumbar facet loading: Positive   Hip ROM: No reproduction of pain with movement -seated      Special Tests        Slump test: Positive right         Medical Decision Making:     Images: The imaging results as well as the actual images of the studies below were reviewed, visualized and interpreted by me. Labs: The results below were reviewed.          Assessment:    - lumbar spinal stenosis L4-5   -right L5 radiculopathy due to facet arthritis cyst    - cervical spondylosis      Plan:        -Physical therapy -  discussed PT, she would like to hold off as she just had extensive PT for her rotator cuff repair    -Medications -lyrica 100mg twice day -renewed   -continue baclofen

## 2023-04-27 ENCOUNTER — OFFICE VISIT (OUTPATIENT)
Age: 58
End: 2023-04-27

## 2023-04-27 DIAGNOSIS — M17.12 UNILATERAL PRIMARY OSTEOARTHRITIS, LEFT KNEE: ICD-10-CM

## 2023-04-27 DIAGNOSIS — M17.11 UNILATERAL PRIMARY OSTEOARTHRITIS, RIGHT KNEE: Primary | ICD-10-CM

## 2023-04-27 RX ORDER — HYALURONATE SODIUM 10 MG/ML
20 SYRINGE (ML) INTRAARTICULAR ONCE
Status: SHIPPED | OUTPATIENT
Start: 2023-04-27

## 2023-04-27 NOTE — PROGRESS NOTES
Patient: Per Rivera                MRN: [id number]       SSN: [id number]  YOB: 1965        AGE: 65 y.o. SEX: female  There is no height or weight on file to calculate BMI. PCP: Brittani Perez MD  04/27/23    Chief Complaint   Patient presents with    Injections     Rt knee euflexxa #2        HISTORY:  Per Rivera is a 65 y.o. female who is seen for reevaluation of Right knee here for 2nd injection of euflexxa    PROCEDURE: Right  knee Injection with Ultrasound Guidance    Indication:Rightknee pain/swelling    After sterile prep, 2 cc of euflexxa were injected into the Right Knee. Intra-articular Ultrasound images captured using 701 AirXP Loop Ultrasound machine using a frequency of 10 MHz with a linear transducer and scanned into patient's chart. OFFICE PROCEDURE PROGRESS NOTE        Chart reviewed for the following:   Erica GUERIN PA-C, have reviewed the History, Physical and updated the Allergic reactions for [place] performed immediately prior to start of procedure:   Erica GUERIN PA-C, have performed the following reviews on Per Rivera prior to the start of the procedure:            * Patient was identified by name and date of birth   * Agreement on procedure being performed was verified  * Risks and Benefits explained to the patient  * Procedure site verified and marked as necessary  * Patient was positioned for comfort  * Consent was signed and verified     Time: 12:10 PM       Date of procedure: 4/27/2023    Procedure performed by:  KARI Aleman    Provider assisted by: None     How tolerated by patient: tolerated the procedure well with no complications    Comments: none    IMPRESSION:     ICD-10-CM    1. Unilateral primary osteoarthritis, right knee  M17.11 AMB POC US DRAIN/INJECT LARGE JOINT/BURSA     sodium hyaluronate (EUFLEXXA, HYALGAN) injection 20 mg      2.  Unilateral primary osteoarthritis, left knee

## 2023-05-02 ENCOUNTER — OFFICE VISIT (OUTPATIENT)
Age: 58
End: 2023-05-02

## 2023-05-02 DIAGNOSIS — M17.11 UNILATERAL PRIMARY OSTEOARTHRITIS, RIGHT KNEE: Primary | ICD-10-CM

## 2023-05-02 RX ORDER — HYALURONATE SODIUM 10 MG/ML
20 SYRINGE (ML) INTRAARTICULAR ONCE
Status: SHIPPED | OUTPATIENT
Start: 2023-05-02

## 2023-05-02 NOTE — PROGRESS NOTES
viscosupplementation series and will return as needed    Scribed by Patricia Hussein Forward) as dictated by Sunday Boas, PA-C Sunday Boas, PA-C [phone]

## 2023-05-04 RX ORDER — BACLOFEN 10 MG/1
TABLET ORAL
Qty: 90 TABLET | Refills: 1 | OUTPATIENT
Start: 2023-05-04

## 2023-05-05 ENCOUNTER — HOSPITAL ENCOUNTER (OUTPATIENT)
Facility: HOSPITAL | Age: 58
Discharge: HOME | End: 2023-05-05
Payer: COMMERCIAL

## 2023-05-05 DIAGNOSIS — M43.10 SPONDYLOLISTHESIS, UNSPECIFIED SPINAL REGION: ICD-10-CM

## 2023-05-05 PROCEDURE — 72141 MRI NECK SPINE W/O DYE: CPT

## 2023-05-08 DIAGNOSIS — M54.10 RADICULOPATHY, SITE UNSPECIFIED: ICD-10-CM

## 2023-05-09 ENCOUNTER — OFFICE VISIT (OUTPATIENT)
Age: 58
End: 2023-05-09

## 2023-05-09 VITALS
WEIGHT: 249 LBS | OXYGEN SATURATION: 98 % | RESPIRATION RATE: 18 BRPM | BODY MASS INDEX: 45.82 KG/M2 | HEART RATE: 71 BPM | HEIGHT: 62 IN

## 2023-05-09 DIAGNOSIS — M17.11 UNILATERAL PRIMARY OSTEOARTHRITIS, RIGHT KNEE: Primary | ICD-10-CM

## 2023-05-09 DIAGNOSIS — M17.12 UNILATERAL PRIMARY OSTEOARTHRITIS, LEFT KNEE: ICD-10-CM

## 2023-05-09 DIAGNOSIS — E66.01 CLASS 3 SEVERE OBESITY WITH BODY MASS INDEX (BMI) OF 45.0 TO 49.9 IN ADULT, UNSPECIFIED OBESITY TYPE, UNSPECIFIED WHETHER SERIOUS COMORBIDITY PRESENT (HCC): ICD-10-CM

## 2023-05-09 PROBLEM — E66.813 CLASS 3 SEVERE OBESITY WITH BODY MASS INDEX (BMI) OF 45.0 TO 49.9 IN ADULT: Status: ACTIVE | Noted: 2020-11-19

## 2023-05-09 SDOH — HEALTH STABILITY: PHYSICAL HEALTH: ON AVERAGE, HOW MANY MINUTES DO YOU ENGAGE IN EXERCISE AT THIS LEVEL?: 30 MIN

## 2023-05-09 SDOH — HEALTH STABILITY: PHYSICAL HEALTH: ON AVERAGE, HOW MANY DAYS PER WEEK DO YOU ENGAGE IN MODERATE TO STRENUOUS EXERCISE (LIKE A BRISK WALK)?: 2 DAYS

## 2023-05-09 NOTE — ASSESSMENT & PLAN NOTE
64-year-old female with bilateral knee osteoarthritis. The left knee is much worse than the right and only responds to corticosteroid and gel injections for short period of time. The right knee responded well to these injections. On x-ray there is bone-on-bone articulation the medial joint space of both knees with the left appearing more significant than the right. The patient has had a gastric bypass previously and regained some weight after having hysterectomy as well as rotator cuff surgery. She also has severe back pain and is being worked up for that now as well and this causes her more problems with knee pain. She just finished Euflexxa injections to the right knee last week and she had Euflexxa injections the left knee ending in early February and has since had a corticosteroid injection of the left knee. At this time patient would like to put off total knee arthroplasty pending her back treatment. She may come back on an as-needed basis for injections for her knees.

## 2023-05-16 ENCOUNTER — TELEPHONE (OUTPATIENT)
Age: 58
End: 2023-05-16

## 2023-05-16 NOTE — TELEPHONE ENCOUNTER
Lvm for the patient letting her know that her appt with [facility] on 5/25 will have to be r/s due to the provider not in the office in the morning

## 2023-05-17 ENCOUNTER — TELEPHONE (OUTPATIENT)
Age: 58
End: 2023-05-17

## 2023-05-17 RX ORDER — PREGABALIN 100 MG/1
100 CAPSULE ORAL 2 TIMES DAILY
Qty: 60 CAPSULE | Refills: 0 | Status: CANCELLED | OUTPATIENT
Start: 2023-05-17 | End: 2023-06-16

## 2023-05-17 RX ORDER — ERGOCALCIFEROL 1.25 MG/1
CAPSULE ORAL
COMMUNITY
Start: 2023-04-24

## 2023-05-17 NOTE — TELEPHONE ENCOUNTER
Requested Prescriptions     Pending Prescriptions Disp Refills    pregabalin (LYRICA) 100 MG capsule 60 capsule 0     Sig: Take 1 capsule by mouth 2 times daily for 30 days. Max Daily Amount: 200 mg        Patient was last seen on 3/14/23. Patient's choice of pharmacy [facility] [place], [place]. The refill request's last refill info   pregabalin (LYRICA) 100 MG capsule 60 capsule 0 3/14/2023 4/13/2023    Sig - Route: Take 1 capsule by mouth 2 times daily for 30 days. Max Daily Amount: 200 mg - Oral    Sent to pharmacy as: Pregabalin 100 MG Oral Capsule (LYRICA)    Notes to Pharmacy: Not to exceed 5 additional fills before 07/09/2023 DX Code Needed  .     E-Prescribing Status: Receipt confirmed by pharmacy (3/14/2023  9:53 AM EDT)

## 2023-05-17 NOTE — TELEPHONE ENCOUNTER
Patient was contacted to reschedule her appointment with us on 5/25 and is now scheduled for 6/14. She says she never heard anything about her refill request for Pregabalin, [facility] [address] in [place].

## 2023-05-18 DIAGNOSIS — M48.061 SPINAL STENOSIS, LUMBAR REGION WITHOUT NEUROGENIC CLAUDICATION: Primary | ICD-10-CM

## 2023-05-18 RX ORDER — PREGABALIN 100 MG/1
100 CAPSULE ORAL 2 TIMES DAILY
Qty: 60 CAPSULE | Refills: 0 | Status: SHIPPED | OUTPATIENT
Start: 2023-05-18 | End: 2023-06-17

## 2023-05-18 NOTE — TELEPHONE ENCOUNTER
I called and spoke to the pt. The pt was identified using 2 pt identifiers. She was notified that a refill of lyrica was sent to her pharmacy this afternoon. The pt verbalized understanding and confirmed her next appt. No questions or concerns voiced.

## 2023-05-19 RX ORDER — PREGABALIN 100 MG/1
100 CAPSULE ORAL 2 TIMES DAILY
Qty: 60 CAPSULE | Refills: 0 | OUTPATIENT
Start: 2023-05-19 | End: 2023-06-18

## 2023-06-06 RX ORDER — BACLOFEN 10 MG/1
TABLET ORAL
Qty: 90 TABLET | Refills: 1 | OUTPATIENT
Start: 2023-06-06

## 2023-07-07 DIAGNOSIS — M48.061 SPINAL STENOSIS, LUMBAR REGION WITHOUT NEUROGENIC CLAUDICATION: ICD-10-CM

## 2023-07-07 RX ORDER — BACLOFEN 10 MG/1
TABLET ORAL
Qty: 90 TABLET | Refills: 1 | Status: SHIPPED | OUTPATIENT
Start: 2023-07-07

## 2023-07-10 DIAGNOSIS — M75.101 UNSPECIFIED ROTATOR CUFF TEAR OR RUPTURE OF RIGHT SHOULDER, NOT SPECIFIED AS TRAUMATIC: ICD-10-CM

## 2023-07-20 ENCOUNTER — TELEPHONE (OUTPATIENT)
Age: 58
End: 2023-07-20

## 2023-07-20 NOTE — TELEPHONE ENCOUNTER
Patient says she was offered 200 mg Lyrica previously and didn't think she would need it, however, she now wakes up in the morning \"doubled over\" in pain and would like to have this [facility] [address].

## 2023-08-05 DIAGNOSIS — M48.061 SPINAL STENOSIS, LUMBAR REGION WITHOUT NEUROGENIC CLAUDICATION: ICD-10-CM

## 2023-08-08 RX ORDER — BACLOFEN 10 MG/1
TABLET ORAL
Qty: 90 TABLET | Refills: 1 | OUTPATIENT
Start: 2023-08-08

## 2023-08-08 NOTE — TELEPHONE ENCOUNTER
Requested Prescriptions     Pending Prescriptions Disp Refills    baclofen (LIORESAL) 10 MG tablet [Pharmacy Med Name: BACLOFEN 10 MG TABLET] 90 tablet 1     Sig: TAKE 1 TABLET BY MOUTH THREE TIMES A DAY      Patient was last seen on 6/14/23. Patient's next appointment None. [facility] [address] [facility]. The refill request's last refill info 7/7/23 Qty 70 w/1 refill. Patient hasn't made a 3 month follow-up appointment.

## 2023-08-21 ENCOUNTER — PATIENT MESSAGE (OUTPATIENT)
Age: 58
End: 2023-08-21

## 2023-08-21 DIAGNOSIS — M48.061 SPINAL STENOSIS, LUMBAR REGION WITHOUT NEUROGENIC CLAUDICATION: Primary | ICD-10-CM

## 2023-08-21 RX ORDER — PREGABALIN 200 MG/1
200 CAPSULE ORAL
Qty: 30 CAPSULE | Refills: 0 | Status: SHIPPED | OUTPATIENT
Start: 2023-08-21 | End: 2023-09-20

## 2023-08-21 NOTE — TELEPHONE ENCOUNTER
From: Pretty Lemon  To: Dr. Sparkle Anderson: 2023 2:25 AM EDT  Subject: Sciatic pain    Good morning Dr. Allan Mitchell,    I am still waking up at night due to pain. I am currently on 150 mg at night. My sleep is still being broken due to pain. To promote better pain relief and improve sleep I would like to change to the Pregabalin 200 mg at bedtime.

## 2023-08-22 ENCOUNTER — TELEPHONE (OUTPATIENT)
Age: 58
End: 2023-08-22

## 2023-08-22 NOTE — TELEPHONE ENCOUNTER
Regarding: FW: Sciatic pain  Contact: [phone]    ----- Message -----  From: Laila Pearson MD  Sent: 8/21/2023   2:53 PM EDT  To: Karen Treviño - [facility]  [facility]  Subject: Sciatic pain                                     ----- Message from Laila Pearson MD sent at 8/21/2023  2:53 PM EDT -----  Can you have her schedule a follow up at some point in the next 4 weeks? Thanks     ----- Message sent from Laila Pearson MD to Vega Oneal at 8/21/2023  2:50 PM -----   Yes, I can increase the lyrica to 200mg at night and 100mg in the morning, that would put you at the maximum dosage  ACB      ----- Message -----       From:Lynn Cardona       Sent:8/21/2023  2:25 AM EDT         To:Dr. Laila Pearson    Subject:Sciatic pain    Good morning Dr. Vahe Canchola,    I am still waking up at night due to pain. I am currently on 150 mg at night. My sleep is still being broken due to pain. To promote better pain relief and improve  sleep I would like to change to the Pregabalin 200 mg at bedtime.

## 2023-08-22 NOTE — TELEPHONE ENCOUNTER
Lvm for the patient to get her scheduled a 4wk f/u with [facility]

## 2023-08-23 DIAGNOSIS — M48.061 SPINAL STENOSIS, LUMBAR REGION WITHOUT NEUROGENIC CLAUDICATION: ICD-10-CM

## 2023-08-23 NOTE — TELEPHONE ENCOUNTER
Requested Prescriptions     Pending Prescriptions Disp Refills    baclofen (LIORESAL) 10 MG tablet [Pharmacy Med Name: BACLOFEN 10 MG TABLET] 90 tablet 1     Sig: TAKE 1 TABLET BY MOUTH THREE TIMES A DAY      Patient was last seen on 6/14/23. Patient's next appointment None. [facility] [address] [facility]. The refill request's last refill info on 7/7/23 Qty 90 w/1 refill. Patient never came back for a 3 month follow-up appointment.

## 2023-08-24 RX ORDER — BACLOFEN 10 MG/1
TABLET ORAL
Qty: 90 TABLET | Refills: 1 | Status: SHIPPED | OUTPATIENT
Start: 2023-08-24

## 2023-08-24 NOTE — TELEPHONE ENCOUNTER
Can you call patient to schedule follow up - increased dosage of lyrica want to check how it is working

## 2023-08-29 ENCOUNTER — OFFICE VISIT (OUTPATIENT)
Age: 58
End: 2023-08-29
Payer: COMMERCIAL

## 2023-08-29 VITALS — BODY MASS INDEX: 45.45 KG/M2 | WEIGHT: 247 LBS | TEMPERATURE: 96.8 F | HEIGHT: 62 IN

## 2023-08-29 DIAGNOSIS — M25.511 CHRONIC PAIN OF BOTH SHOULDERS: Primary | ICD-10-CM

## 2023-08-29 DIAGNOSIS — M25.811 IMPINGEMENT OF RIGHT SHOULDER: ICD-10-CM

## 2023-08-29 DIAGNOSIS — M75.42 IMPINGEMENT SYNDROME, SHOULDER, LEFT: ICD-10-CM

## 2023-08-29 DIAGNOSIS — M25.512 CHRONIC PAIN OF BOTH SHOULDERS: Primary | ICD-10-CM

## 2023-08-29 DIAGNOSIS — G89.29 CHRONIC PAIN OF BOTH SHOULDERS: Primary | ICD-10-CM

## 2023-08-29 PROCEDURE — 20611 DRAIN/INJ JOINT/BURSA W/US: CPT | Performed by: PHYSICIAN ASSISTANT

## 2023-08-29 PROCEDURE — G8417 CALC BMI ABV UP PARAM F/U: HCPCS | Performed by: PHYSICIAN ASSISTANT

## 2023-08-29 PROCEDURE — 99213 OFFICE O/P EST LOW 20 MIN: CPT | Performed by: PHYSICIAN ASSISTANT

## 2023-08-29 PROCEDURE — 73030 X-RAY EXAM OF SHOULDER: CPT | Performed by: PHYSICIAN ASSISTANT

## 2023-08-29 PROCEDURE — 3017F COLORECTAL CA SCREEN DOC REV: CPT | Performed by: PHYSICIAN ASSISTANT

## 2023-08-29 PROCEDURE — G8427 DOCREV CUR MEDS BY ELIG CLIN: HCPCS | Performed by: PHYSICIAN ASSISTANT

## 2023-08-29 PROCEDURE — 1036F TOBACCO NON-USER: CPT | Performed by: PHYSICIAN ASSISTANT

## 2023-08-29 RX ORDER — TRIAMCINOLONE ACETONIDE 40 MG/ML
40 INJECTION, SUSPENSION INTRA-ARTICULAR; INTRAMUSCULAR ONCE
Status: COMPLETED | OUTPATIENT
Start: 2023-08-29 | End: 2023-08-29

## 2023-08-29 RX ADMIN — TRIAMCINOLONE ACETONIDE 40 MG: 40 INJECTION, SUSPENSION INTRA-ARTICULAR; INTRAMUSCULAR at 14:04

## 2023-08-29 RX ADMIN — TRIAMCINOLONE ACETONIDE 40 MG: 40 INJECTION, SUSPENSION INTRA-ARTICULAR; INTRAMUSCULAR at 14:05

## 2023-09-14 DIAGNOSIS — M48.061 SPINAL STENOSIS, LUMBAR REGION WITHOUT NEUROGENIC CLAUDICATION: ICD-10-CM

## 2023-09-15 RX ORDER — PREGABALIN 100 MG/1
100 CAPSULE ORAL EVERY MORNING
Qty: 30 CAPSULE | Refills: 2 | OUTPATIENT
Start: 2023-09-15 | End: 2023-12-14

## 2023-10-05 ENCOUNTER — OFFICE VISIT (OUTPATIENT)
Age: 58
End: 2023-10-05
Payer: COMMERCIAL

## 2023-10-05 VITALS — BODY MASS INDEX: 45.45 KG/M2 | OXYGEN SATURATION: 98 % | HEIGHT: 62 IN | HEART RATE: 70 BPM | WEIGHT: 247 LBS

## 2023-10-05 DIAGNOSIS — M48.061 SPINAL STENOSIS, LUMBAR REGION WITHOUT NEUROGENIC CLAUDICATION: ICD-10-CM

## 2023-10-05 DIAGNOSIS — M43.16 SPONDYLOLISTHESIS OF LUMBAR REGION: Primary | ICD-10-CM

## 2023-10-05 PROCEDURE — G8427 DOCREV CUR MEDS BY ELIG CLIN: HCPCS | Performed by: PHYSICAL MEDICINE & REHABILITATION

## 2023-10-05 PROCEDURE — 99214 OFFICE O/P EST MOD 30 MIN: CPT | Performed by: PHYSICAL MEDICINE & REHABILITATION

## 2023-10-05 PROCEDURE — G8417 CALC BMI ABV UP PARAM F/U: HCPCS | Performed by: PHYSICAL MEDICINE & REHABILITATION

## 2023-10-05 PROCEDURE — G8484 FLU IMMUNIZE NO ADMIN: HCPCS | Performed by: PHYSICAL MEDICINE & REHABILITATION

## 2023-10-05 PROCEDURE — 3017F COLORECTAL CA SCREEN DOC REV: CPT | Performed by: PHYSICAL MEDICINE & REHABILITATION

## 2023-10-05 PROCEDURE — 1036F TOBACCO NON-USER: CPT | Performed by: PHYSICAL MEDICINE & REHABILITATION

## 2023-10-05 RX ORDER — BACLOFEN 10 MG/1
10 TABLET ORAL 3 TIMES DAILY
Qty: 270 TABLET | Refills: 0 | Status: SHIPPED | OUTPATIENT
Start: 2023-10-05 | End: 2024-01-03

## 2023-10-05 RX ORDER — PREGABALIN 100 MG/1
100 CAPSULE ORAL EVERY MORNING
Qty: 90 CAPSULE | Refills: 0 | Status: SHIPPED | OUTPATIENT
Start: 2023-10-05 | End: 2024-01-03

## 2023-10-05 RX ORDER — ESTRADIOL 0.1 MG/G
CREAM VAGINAL
COMMUNITY
Start: 2023-09-11

## 2023-10-05 RX ORDER — PREGABALIN 150 MG/1
150 CAPSULE ORAL
Qty: 90 CAPSULE | Refills: 0 | Status: SHIPPED | OUTPATIENT
Start: 2023-10-05 | End: 2024-01-03

## 2023-10-05 ASSESSMENT — PATIENT HEALTH QUESTIONNAIRE - PHQ9
SUM OF ALL RESPONSES TO PHQ QUESTIONS 1-9: 0
SUM OF ALL RESPONSES TO PHQ QUESTIONS 1-9: 0
2. FEELING DOWN, DEPRESSED OR HOPELESS: 0
SUM OF ALL RESPONSES TO PHQ QUESTIONS 1-9: 0
SUM OF ALL RESPONSES TO PHQ9 QUESTIONS 1 & 2: 0
SUM OF ALL RESPONSES TO PHQ QUESTIONS 1-9: 0
1. LITTLE INTEREST OR PLEASURE IN DOING THINGS: 0

## 2023-10-05 NOTE — PROGRESS NOTES
Maritza Waters presents today for   Chief Complaint   Patient presents with    Back Pain       Is someone accompanying this pt? no    Is the patient using any DME equipment during OV? no    Depression Screening:       No data to display                Learning Assessment:  No flowsheet data found. Abuse Screening:       No data to display                Fall Risk  No flowsheet data found. OPIOID RISK TOOL  No flowsheet data found. Coordination of Care:  1. Have you been to the ER, urgent care clinic since your last visit? no  Hospitalized since your last visit? no    2. Have you seen or consulted any other health care providers outside of the [address] [place] [address] since your last visit? no Include any pap smears or colon screening.  no

## 2023-10-05 NOTE — PROGRESS NOTES
[facility]  [address] [place] [address]    Phone: [phone]   Fax: [phone]         Patient: Derek Hoff                                                                               MRN: [id number]         YOB: 1965           AGE: 65 y.o. PCP: Cj Carey MD      Reason for Consultation: Back Pain         HPI:   Derek Hoff is a 65 y.o. female with relevant PMH of HTN, GERD, gastric bypass 2017 who presented with low back pain radiating down R>L leg. She also reports neck pain which radiates into bilateral  arms. 12/6/22 she had a right L5 SNRB which gave her about 60% relief for about 2 weeks. After the injection she started to notice pain increased pain down the left leg and was wondering if she can have an injection on her left side. She tried bilateral L5 SNRB 2/7/23 which gave 2 weeks of 100% relief and then pain returned. 4/19/23 she repeated b/l L5 SNRB and this time only had 3 days of relief. She has seen Dr. Tyesha Carlos and is considering spine surgery but would like to lose weight first      She has seen Dr. Charla Arceo for knee arthritis and has tried euflexxa  She sees Ganga Bond for bilateral shoulder pain ( history rotator cuff repair on right in 2021)   Neurologic symptoms: +numbness, tingling, bilateral feet. No weakness, bowel or bladder changes. No recent falls        Location: The pain is located in the low back pain , neck    Radiation: The pain does radiate down bilateral legs today L>R   Pain Score: Currently: 6/10  At Best: 5/10  At Worst: 10/10    Quality: Pain is of a Burning, Cramping, Stiff, Tingling, Numbness, and Pulling quality. Aggravating: Pain is exacerbated by walking, sitting, standing, and getting up in the morning    Alleviating:  The pain is alleviated by  sitting in recliner      Prior Treatments:   Physical therapy: YES completed 8/2022 for right RTC

## 2023-12-29 ENCOUNTER — OFFICE VISIT (OUTPATIENT)
Age: 58
End: 2023-12-29
Payer: COMMERCIAL

## 2023-12-29 VITALS — RESPIRATION RATE: 18 BRPM | HEIGHT: 62 IN | BODY MASS INDEX: 45.18 KG/M2

## 2023-12-29 DIAGNOSIS — M75.42 IMPINGEMENT SYNDROME, SHOULDER, LEFT: ICD-10-CM

## 2023-12-29 DIAGNOSIS — M17.11 UNILATERAL PRIMARY OSTEOARTHRITIS, RIGHT KNEE: Primary | ICD-10-CM

## 2023-12-29 PROCEDURE — 1036F TOBACCO NON-USER: CPT | Performed by: PHYSICIAN ASSISTANT

## 2023-12-29 PROCEDURE — 99213 OFFICE O/P EST LOW 20 MIN: CPT | Performed by: PHYSICIAN ASSISTANT

## 2023-12-29 PROCEDURE — G8417 CALC BMI ABV UP PARAM F/U: HCPCS | Performed by: PHYSICIAN ASSISTANT

## 2023-12-29 PROCEDURE — 20611 DRAIN/INJ JOINT/BURSA W/US: CPT | Performed by: PHYSICIAN ASSISTANT

## 2023-12-29 PROCEDURE — G8427 DOCREV CUR MEDS BY ELIG CLIN: HCPCS | Performed by: PHYSICIAN ASSISTANT

## 2023-12-29 PROCEDURE — G8484 FLU IMMUNIZE NO ADMIN: HCPCS | Performed by: PHYSICIAN ASSISTANT

## 2023-12-29 PROCEDURE — 3017F COLORECTAL CA SCREEN DOC REV: CPT | Performed by: PHYSICIAN ASSISTANT

## 2023-12-29 RX ORDER — TRIAMCINOLONE ACETONIDE 40 MG/ML
40 INJECTION, SUSPENSION INTRA-ARTICULAR; INTRAMUSCULAR ONCE
Status: COMPLETED | OUTPATIENT
Start: 2023-12-29 | End: 2023-12-29

## 2023-12-29 RX ADMIN — TRIAMCINOLONE ACETONIDE 40 MG: 40 INJECTION, SUSPENSION INTRA-ARTICULAR; INTRAMUSCULAR at 13:32

## 2023-12-29 RX ADMIN — TRIAMCINOLONE ACETONIDE 40 MG: 40 INJECTION, SUSPENSION INTRA-ARTICULAR; INTRAMUSCULAR at 13:33

## 2023-12-29 NOTE — PROGRESS NOTES
Physical and updated the Allergic reactions for [facility] performed immediately prior to start of procedure:  IEron PA-C, have performed the following reviews on [place] [facility] prior to the start of the procedure:            * Patient was identified by name and date of birth   * Agreement on procedure being performed was verified  * Risks and Benefits explained to the patient  * Procedure site verified and marked as necessary  * Patient was positioned for comfort  * Consent was signed and verified     Time: 1:32 PM    Date of procedure: 12/29/2023    Procedure performed by:  KARI Perry    Provider assisted by: (see medication administration)    How tolerated by patient: tolerated the procedure well with no complications    Comments: none     IMAGING: Three-view x-rays of bilateral shoulders taken in the office on 8/29/2023 read and reviewed by myself reveal right shoulder status post acromioplasty with moderate AC joint arthropathy. Left shoulder shows a grade 3 subacromial spur with no acute abnormalities in bilateral shoulders    IMPRESSION:      ICD-10-CM    1. Unilateral primary osteoarthritis, right knee  M17.11 AMB POC US DRAIN/INJECT LARGE JOINT/BURSA     triamcinolone acetonide (KENALOG-40) injection 40 mg      2. Impingement syndrome, shoulder, left  M75.42 AMB POC US DRAIN/INJECT LARGE JOINT/BURSA     triamcinolone acetonide (KENALOG-40) injection 40 mg             PLAN:   1. Patient with left shoulder pain consistent with impingement syndrome possible partial-thickness tear of the rotator cuff. Given her success with her last cortisone injection we will repeat a cortisone injection under ultrasound guidance. We will also continue with her physician directed exercise program.  Patient with right knee degenerative arthritis. Given the acute exacerbation today we will inject with cortisone under ultrasound guidance.   Discussed with her that she will require knee

## 2024-01-10 DIAGNOSIS — M48.061 SPINAL STENOSIS, LUMBAR REGION WITHOUT NEUROGENIC CLAUDICATION: ICD-10-CM

## 2024-01-10 RX ORDER — BACLOFEN 10 MG/1
10 TABLET ORAL 3 TIMES DAILY
Qty: 90 TABLET | Refills: 2 | Status: SHIPPED | OUTPATIENT
Start: 2024-01-10

## 2024-01-19 DIAGNOSIS — M43.16 SPONDYLOLISTHESIS OF LUMBAR REGION: ICD-10-CM

## 2024-01-19 DIAGNOSIS — M48.061 SPINAL STENOSIS, LUMBAR REGION WITHOUT NEUROGENIC CLAUDICATION: ICD-10-CM

## 2024-01-22 RX ORDER — PREGABALIN 150 MG/1
150 CAPSULE ORAL
Qty: 30 CAPSULE | Refills: 2 | Status: SHIPPED | OUTPATIENT
Start: 2024-01-22 | End: 2024-04-21

## 2024-01-22 RX ORDER — PREGABALIN 100 MG/1
100 CAPSULE ORAL EVERY MORNING
Qty: 30 CAPSULE | Refills: 2 | Status: SHIPPED | OUTPATIENT
Start: 2024-01-22 | End: 2024-04-21

## 2024-02-27 ENCOUNTER — OFFICE VISIT (OUTPATIENT)
Age: 59
End: 2024-02-27

## 2024-02-27 VITALS — WEIGHT: 238 LBS | TEMPERATURE: 97 F | BODY MASS INDEX: 43.79 KG/M2 | HEIGHT: 62 IN

## 2024-02-27 DIAGNOSIS — M17.12 PRIMARY OSTEOARTHRITIS OF LEFT KNEE: ICD-10-CM

## 2024-02-27 DIAGNOSIS — M18.11 ARTHRITIS OF CARPOMETACARPAL (CMC) JOINT OF RIGHT THUMB: ICD-10-CM

## 2024-02-27 DIAGNOSIS — M25.531 RIGHT WRIST PAIN: Primary | ICD-10-CM

## 2024-02-27 RX ORDER — TRIAMCINOLONE ACETONIDE 40 MG/ML
40 INJECTION, SUSPENSION INTRA-ARTICULAR; INTRAMUSCULAR ONCE
Status: COMPLETED | OUTPATIENT
Start: 2024-02-27 | End: 2024-02-27

## 2024-02-27 RX ORDER — LIDOCAINE HYDROCHLORIDE 10 MG/ML
4 INJECTION, SOLUTION INFILTRATION; PERINEURAL ONCE
Status: COMPLETED | OUTPATIENT
Start: 2024-02-27 | End: 2024-02-27

## 2024-02-27 RX ORDER — LIDOCAINE HYDROCHLORIDE 10 MG/ML
1 INJECTION, SOLUTION INFILTRATION; PERINEURAL ONCE
Status: COMPLETED | OUTPATIENT
Start: 2024-02-27 | End: 2024-02-27

## 2024-02-27 RX ADMIN — LIDOCAINE HYDROCHLORIDE 1 ML: 10 INJECTION, SOLUTION INFILTRATION; PERINEURAL at 14:07

## 2024-02-27 RX ADMIN — TRIAMCINOLONE ACETONIDE 40 MG: 40 INJECTION, SUSPENSION INTRA-ARTICULAR; INTRAMUSCULAR at 14:11

## 2024-02-27 RX ADMIN — LIDOCAINE HYDROCHLORIDE 4 ML: 10 INJECTION, SOLUTION INFILTRATION; PERINEURAL at 14:10

## 2024-02-27 NOTE — PROGRESS NOTES
Lynn Cardona  1965   Chief Complaint   Patient presents with    Hand Pain     Right thumb pain        HISTORY OF PRESENT ILLNESS  Lynn Cardona is a 58 y.o. female who presents today for evaluation of right hand pain after she fell on Friday 2/23. She states she landed on this wrist.  She has had an increase in pain.  She has had on and off pain in that thumb and wrist over time but now it is worsened since the fall.  It hurts to move her thumb.  She also complains of left knee pain.  This has been a chronic problem but over the last 3 weeks it is worsened.  She denies any swelling but notes that it gives her a dull throbbing aching pain.  She is using Voltaren gel on a day-to-day basis to help with the pain where before she was only using that as needed.  Pain is a 6/10.   Has tried following treatments: Injections:Yes; Brace:Yes; Therapy:No; Cane/Crutch:No      Allergies   Allergen Reactions    Sulfamethoxazole-Trimethoprim Itching    Amoxicillin-Pot Clavulanate Diarrhea     Severe diarrhea    Sulfa Antibiotics Itching        Past Medical History:   Diagnosis Date    Arthritis     osteoarthritis    Fibroid, uterine     GERD (gastroesophageal reflux disease)     Gout     Headache     Hypertension     Post-menopausal bleeding     Sleep apnea     Uses CPAP      Social History       Tobacco History       Smoking Status  Never      Smokeless Tobacco Use  Never              Alcohol History       Alcohol Use Status  Yes Comment  Occ              Drug Use       Drug Use Status  Never              Sexual Activity       Sexually Active  Not Asked                   Past Surgical History:   Procedure Laterality Date    ANESTH,SURGERY OF SHOULDER Right 04/08/2022    Rt Shoulder Arthroscopic rotator cuff repair    BREAST REDUCTION SURGERY  2010    COLONOSCOPY N/A 4/5/2021    COLONOSCOPY performed by Taz Cool MD at [facility] ENDOSCOPY    GASTRIC BYPASS SURGERY  10/2017    GASTRIC BYPASS SURGERY  2017

## 2024-04-15 ENCOUNTER — TELEPHONE (OUTPATIENT)
Age: 59
End: 2024-04-15

## 2024-04-15 DIAGNOSIS — M48.061 SPINAL STENOSIS, LUMBAR REGION WITHOUT NEUROGENIC CLAUDICATION: ICD-10-CM

## 2024-04-15 NOTE — TELEPHONE ENCOUNTER
Pt has not been seen since October 2023 and will need to have a follow up appt with Dr. Sadler in order to get a refill. The visit can be in person or virtual.

## 2024-04-15 NOTE — TELEPHONE ENCOUNTER
Patient states that she will be able to make her appt for 6/4, but wants to know if Dr. Sadler will call in enough Lyrica to last her until 6/4 appt. Patient states that she can do the virtual visit because it need to be in the morning.    Patient tel [phone]

## 2024-04-15 NOTE — TELEPHONE ENCOUNTER
Patient called and is asking for a refill on the Lyrica medication from .    [facility] [address]  Tel. [phone]    Patient tel. [phone].

## 2024-04-16 RX ORDER — PREGABALIN 100 MG/1
100 CAPSULE ORAL NIGHTLY
Qty: 60 CAPSULE | Refills: 0 | Status: SHIPPED | OUTPATIENT
Start: 2024-04-16 | End: 2024-06-15

## 2024-04-16 NOTE — TELEPHONE ENCOUNTER
When I saw her last - she was on - lyrica 100mg in the morning 150mg in the evening. It looks like she asked for removal 150mg lyrica in my chart.  Can we clarify what dose she is taking?

## 2024-04-16 NOTE — TELEPHONE ENCOUNTER
I called and spoke to the pt. The pt was identified using 2 pt identifiers. She was asked what her current dose of lyrica is. She states that she is only taking 100 mg at bedtime. Message will be routed back to the provider for further review.

## 2024-04-23 DIAGNOSIS — M48.061 SPINAL STENOSIS, LUMBAR REGION WITHOUT NEUROGENIC CLAUDICATION: ICD-10-CM

## 2024-04-25 ENCOUNTER — TELEPHONE (OUTPATIENT)
Age: 59
End: 2024-04-25

## 2024-04-25 DIAGNOSIS — M48.061 SPINAL STENOSIS, LUMBAR REGION WITHOUT NEUROGENIC CLAUDICATION: ICD-10-CM

## 2024-04-25 NOTE — TELEPHONE ENCOUNTER
Patient is requesting a refill for baclofen (LIORESAL) 10 MG tablet     [facility]/pharm[facility] #[id number] - [place], [place] - [address] [phone] - F [phone]     Patient tel [phone]

## 2024-04-29 RX ORDER — BACLOFEN 10 MG/1
10 TABLET ORAL 3 TIMES DAILY
Qty: 90 TABLET | Refills: 2 | Status: SHIPPED | OUTPATIENT
Start: 2024-04-29

## 2024-04-30 RX ORDER — BACLOFEN 10 MG/1
10 TABLET ORAL 3 TIMES DAILY
Qty: 90 TABLET | Refills: 2 | OUTPATIENT
Start: 2024-04-30

## 2024-06-04 ENCOUNTER — OFFICE VISIT (OUTPATIENT)
Age: 59
End: 2024-06-04
Payer: COMMERCIAL

## 2024-06-04 VITALS — WEIGHT: 240 LBS | BODY MASS INDEX: 44.16 KG/M2 | HEIGHT: 62 IN

## 2024-06-04 DIAGNOSIS — M48.061 SPINAL STENOSIS, LUMBAR REGION WITHOUT NEUROGENIC CLAUDICATION: ICD-10-CM

## 2024-06-04 DIAGNOSIS — M48.062 SPINAL STENOSIS OF LUMBAR REGION WITH NEUROGENIC CLAUDICATION: ICD-10-CM

## 2024-06-04 DIAGNOSIS — M54.16 RIGHT LUMBAR RADICULITIS: ICD-10-CM

## 2024-06-04 DIAGNOSIS — M43.16 SPONDYLOLISTHESIS OF LUMBAR REGION: Primary | ICD-10-CM

## 2024-06-04 PROCEDURE — 99214 OFFICE O/P EST MOD 30 MIN: CPT | Performed by: PHYSICAL MEDICINE & REHABILITATION

## 2024-06-04 PROCEDURE — G8417 CALC BMI ABV UP PARAM F/U: HCPCS | Performed by: PHYSICAL MEDICINE & REHABILITATION

## 2024-06-04 PROCEDURE — G8427 DOCREV CUR MEDS BY ELIG CLIN: HCPCS | Performed by: PHYSICAL MEDICINE & REHABILITATION

## 2024-06-04 PROCEDURE — 3017F COLORECTAL CA SCREEN DOC REV: CPT | Performed by: PHYSICAL MEDICINE & REHABILITATION

## 2024-06-04 PROCEDURE — 1036F TOBACCO NON-USER: CPT | Performed by: PHYSICAL MEDICINE & REHABILITATION

## 2024-06-04 RX ORDER — PREGABALIN 150 MG/1
150 CAPSULE ORAL NIGHTLY
Qty: 90 CAPSULE | Refills: 0 | Status: SHIPPED | OUTPATIENT
Start: 2024-06-04 | End: 2024-09-02

## 2024-06-04 RX ORDER — BACLOFEN 10 MG/1
10 TABLET ORAL 3 TIMES DAILY
Qty: 90 TABLET | Refills: 2 | Status: SHIPPED | OUTPATIENT
Start: 2024-06-04

## 2024-06-04 NOTE — PROGRESS NOTES
[facility]  [facility], [address]   [place] [address]   Phone: [phone]   Fax: [phone]         Patient: Lynn Cardona                                                                               MRN: [id number]         YOB: 1965           AGE: 57 y.o.               PCP: Som Oh MD      Reason for Consultation: Back Pain         HPI:   Lynn Cardona is a 57 y.o. female with relevant PMH of HTN, GERD, gastric bypass 2017 who presented with low back pain radiating down R>L leg.  She also reports neck pain which radiates into bilateral  arms.   12/6/22 she had a right L5 SNRB which gave her about 60% relief for about 2 weeks. After the injection she started to notice pain increased pain down the left leg and was wondering if she can have an injection on her left side.  She tried bilateral L5 SNRB 2/7/23 which gave 2 weeks of 100% relief and then pain returned.  4/19/23 she repeated b/l L5 SNRB and this time only had 3 days of relief.  She has seen Dr. Olsen and is considering spine surgery but would like to lose weight first . She is taking  lyrica which we have been adjusting and she takes baclofen. She would like to try another injections    She has seen Dr. Enriquez for knee arthritis and has tried euflexxa  She sees Nery Acosta for bilateral shoulder pain ( history rotator cuff repair on right in 2021)   Neurologic symptoms: +numbness, tingling, bilateral feet. No weakness, bowel or bladder changes.  Had a fall 4/30/2024 chipped her tooth.      Location: The pain is located in the low back pain , neck    Radiation: The pain does radiate down R>L    Pain Score: Currently: 6/10  At Best: 5/10  At Worst: 10/10    Quality: Pain is of a Burning, Cramping, Stiff, Tingling, Numbness, and Pulling quality.     Aggravating: Pain is exacerbated by walking, sitting, standing, and getting up in the morning    Alleviating: The pain is

## 2024-06-04 NOTE — H&P (VIEW-ONLY)
[facility]  [facility], [address]   [place] [address]   Phone: [phone]   Fax: [phone]         Patient: Lynn Cardona                                                                               MRN: [id number]         YOB: 1965           AGE: 57 y.o.               PCP: Som Oh MD      Reason for Consultation: Back Pain         HPI:   Lynn Cardona is a 57 y.o. female with relevant PMH of HTN, GERD, gastric bypass 2017 who presented with low back pain radiating down R>L leg.  She also reports neck pain which radiates into bilateral  arms.   12/6/22 she had a right L5 SNRB which gave her about 60% relief for about 2 weeks. After the injection she started to notice pain increased pain down the left leg and was wondering if she can have an injection on her left side.  She tried bilateral L5 SNRB 2/7/23 which gave 2 weeks of 100% relief and then pain returned.  4/19/23 she repeated b/l L5 SNRB and this time only had 3 days of relief.  She has seen Dr. Olsen and is considering spine surgery but would like to lose weight first . She is taking  lyrica which we have been adjusting and she takes baclofen. She would like to try another injections    She has seen Dr. Enriquez for knee arthritis and has tried euflexxa  She sees Nery Acosta for bilateral shoulder pain ( history rotator cuff repair on right in 2021)   Neurologic symptoms: +numbness, tingling, bilateral feet. No weakness, bowel or bladder changes.  Had a fall 4/30/2024 chipped her tooth.      Location: The pain is located in the low back pain , neck    Radiation: The pain does radiate down R>L    Pain Score: Currently: 6/10  At Best: 5/10  At Worst: 10/10    Quality: Pain is of a Burning, Cramping, Stiff, Tingling, Numbness, and Pulling quality.     Aggravating: Pain is exacerbated by walking, sitting, standing, and getting up in the morning    Alleviating: The pain is

## 2024-06-04 NOTE — PROGRESS NOTES
Lynn Cardona presents today for   Chief Complaint   Patient presents with    Back Pain     Lumbar spine       Is someone accompanying this pt? no    Is the patient using any DME equipment during OV? no    Depression Screening:       No data to display                Learning Assessment:  Failed to redirect to the Timeline version of the [employer] SmartLink.    Abuse Screening:       No data to display                Fall Risk  Failed to redirect to the Timeline version of the [employer] SmartLink.    OPIOID RISK TOOL  Failed to redirect to the Timeline version of the [employer] SmartLink.    Coordination of Care:  1. Have you been to the ER, urgent care clinic since your last visit? no  Hospitalized since your last visit? no    2. Have you seen or consulted any other health care providers outside of the [facility] since your last visit? no Include any pap smears or colon screening. no    Last Pain Agreement Signed No opioids

## 2024-06-18 ENCOUNTER — HOSPITAL ENCOUNTER (OUTPATIENT)
Facility: HOSPITAL | Age: 59
Discharge: HOME | End: 2024-06-21
Payer: COMMERCIAL

## 2024-06-18 VITALS
OXYGEN SATURATION: 97 % | SYSTOLIC BLOOD PRESSURE: 92 MMHG | TEMPERATURE: 98 F | DIASTOLIC BLOOD PRESSURE: 58 MMHG | RESPIRATION RATE: 14 BRPM | HEART RATE: 69 BPM

## 2024-06-18 PROCEDURE — 6360000004 HC RX CONTRAST MEDICATION: Performed by: PHYSICAL MEDICINE & REHABILITATION

## 2024-06-18 PROCEDURE — 2500000003 HC RX 250 WO HCPCS: Performed by: PHYSICAL MEDICINE & REHABILITATION

## 2024-06-18 PROCEDURE — 6370000000 HC RX 637 (ALT 250 FOR IP): Performed by: PHYSICAL MEDICINE & REHABILITATION

## 2024-06-18 PROCEDURE — 64484 NJX AA&/STRD TFRM EPI L/S EA: CPT

## 2024-06-18 PROCEDURE — 64483 NJX AA&/STRD TFRM EPI L/S 1: CPT

## 2024-06-18 PROCEDURE — 64484 NJX AA&/STRD TFRM EPI L/S EA: CPT | Performed by: PHYSICAL MEDICINE & REHABILITATION

## 2024-06-18 PROCEDURE — 64483 NJX AA&/STRD TFRM EPI L/S 1: CPT | Performed by: PHYSICAL MEDICINE & REHABILITATION

## 2024-06-18 PROCEDURE — 6360000002 HC RX W HCPCS: Performed by: PHYSICAL MEDICINE & REHABILITATION

## 2024-06-18 RX ORDER — PHENOL 1.4 %
1 AEROSOL, SPRAY (ML) MUCOUS MEMBRANE DAILY
COMMUNITY

## 2024-06-18 RX ORDER — DIAZEPAM 5 MG/1
5 TABLET ORAL ONCE
Status: COMPLETED | OUTPATIENT
Start: 2024-06-18 | End: 2024-06-18

## 2024-06-18 RX ORDER — DIAZEPAM 5 MG/1
10 TABLET ORAL ONCE
Status: COMPLETED | OUTPATIENT
Start: 2024-06-18 | End: 2024-06-18

## 2024-06-18 RX ORDER — DEXAMETHASONE SODIUM PHOSPHATE 10 MG/ML
10 INJECTION, SOLUTION INTRAMUSCULAR; INTRAVENOUS ONCE
Status: COMPLETED | OUTPATIENT
Start: 2024-06-18 | End: 2024-06-18

## 2024-06-18 RX ORDER — M-VIT,TX,IRON,MINS/CALC/FOLIC 27MG-0.4MG
1 TABLET ORAL DAILY
COMMUNITY

## 2024-06-18 RX ORDER — LIDOCAINE HYDROCHLORIDE 10 MG/ML
5-30 INJECTION, SOLUTION EPIDURAL; INFILTRATION; INTRACAUDAL; PERINEURAL ONCE
Status: COMPLETED | OUTPATIENT
Start: 2024-06-18 | End: 2024-06-18

## 2024-06-18 RX ORDER — IOPAMIDOL 408 MG/ML
4 INJECTION, SOLUTION INTRATHECAL
Status: COMPLETED | OUTPATIENT
Start: 2024-06-18 | End: 2024-06-18

## 2024-06-18 RX ORDER — DIAZEPAM 5 MG/1
2.5 TABLET ORAL ONCE
Status: COMPLETED | OUTPATIENT
Start: 2024-06-18 | End: 2024-06-18

## 2024-06-18 RX ADMIN — IOPAMIDOL 2 ML: 408 INJECTION, SOLUTION INTRATHECAL at 09:45

## 2024-06-18 RX ADMIN — LIDOCAINE HYDROCHLORIDE 15 ML: 10 INJECTION, SOLUTION EPIDURAL; INFILTRATION; INTRACAUDAL; PERINEURAL at 09:42

## 2024-06-18 RX ADMIN — DIAZEPAM 10 MG: 5 TABLET ORAL at 09:27

## 2024-06-18 RX ADMIN — DEXAMETHASONE SODIUM PHOSPHATE 10 MG: 10 INJECTION, SOLUTION INTRAMUSCULAR; INTRAVENOUS at 09:46

## 2024-06-18 ASSESSMENT — PAIN SCALES - GENERAL: PAINLEVEL_OUTOF10: 4

## 2024-06-18 ASSESSMENT — PAIN - FUNCTIONAL ASSESSMENT: PAIN_FUNCTIONAL_ASSESSMENT: 0-10

## 2024-06-18 ASSESSMENT — PAIN DESCRIPTION - DESCRIPTORS: DESCRIPTORS: ACHING

## 2024-06-18 NOTE — INTERVAL H&P NOTE
Update History & Physical    The patient's History and Physical of June 4, 2024 was reviewed. There was no change. The surgical site was confirmed by the patient and me.  She has had temporary benefit from transforaminal epidural injections in the past.  She is considering surgery but needs to lose weight first.  We will give her a trial of intralaminar epidural steroid injection.    Plan: The risks, benefits, expected outcome, and alternative to the recommended procedure have been discussed with the patient. Patient understands and wants to proceed with the procedure.     Electronically signed by LAURIE MEDINA MD on 6/18/2024 at 9:33 AM

## 2024-06-18 NOTE — DISCHARGE INSTRUCTIONS
Your blood pressure was on the low side today. You were not having any symptoms of low BP.  Hold lisinopril/HCTZ, blood pressure medication today.  Monitor at BP at home.  If feeling dizzy, call for help. PCP/ED  FU PCP, poss med reduction.

## 2024-06-18 NOTE — PERIOP NOTE
Patient brought back to preop for blood pressure recheck. Offered water. Blood pressure lower than baseline. Notified MD. Heart rate and sp02 within normal limits. Patient given instructions to monitor blood pressure, if it remains the same, hold blood pressure medication and inform primary physician adjustment to medication may be needed.

## 2024-06-18 NOTE — PROCEDURES
SELECTIVE NERVE ROOT BLOCK PROCEDURE NOTE      Patient Name: Lynn Cardona  Date of Procedure: June 18, 2024  Preoperative Diagnosis:  Lumbar Radicular Pain, right L4-L5 synovial cyst  Post Operative Diagnosis:  Lumbar Radicular Pain, right L4-L5 synovial cyst  Location:  [place], [place]    Procedure :    right L4 Selective Nerve Root Block  right L5 Selective Nerve Root Block    Consent:  Informed consent was obtained prior to the procedure.  The patient was given the opportunity to ask questions regarding the procedure and its associated risks.  In addition to the potential risks associated with the procedure itself, the patient was informed both verbally and in writing of the potential side effects of the use of glucocorticoid.  The patient appeared to comprehend the informed consent and desired to have the procedure performed.        Procedure:  The patient was placed in the prone position on the fluoroscopy table and the back was prepped and draped in the usual sterile manner.  The exact spinal level was  identified using fluoroscopy, and Lidocaine 1 % was injected locally, a 22 gauge spinal needle was passed to the transverse process.  The depth was noted and the needle redirected to pass inferior and approximately one cm anterior to the transverse process.    Yes  about 1 cc of Isovue M-200 was used to verify positioning in the epidural and paravertebral space and outlined the course of the spinal nerve into the epidural space.  The same procedure was repeated at each spinal level indicated above. No vascular uptake was identified.    A total of 10 mg of preservative free Dexamethasone and 1 cc of Lidocaine/site was slowly injected.       The patient tolerated the procedure well.  The injection area was cleaned and bandaids applied.  Not excessive bleeding was noted.   Patient dressed and discharged to home with instructions.    Discussion: The patient tolerated the

## 2024-06-24 ENCOUNTER — TELEPHONE (OUTPATIENT)
Age: 59
End: 2024-06-24

## 2024-06-25 NOTE — TELEPHONE ENCOUNTER
Patient called to ask if it would be possible to set up a nerve block for the left side, like she had the right side done on 6/18. She feels the right has done well and is having greater pain on the left and would now like to do that as well if possible.    Please review and advise patient, [phone]  
She needs to set up a follow up appointment so we can document results with nerve block and order new procedure  
Attending Attestation (For Attendings USE Only)...

## 2024-06-28 ENCOUNTER — TELEMEDICINE (OUTPATIENT)
Age: 59
End: 2024-06-28
Payer: COMMERCIAL

## 2024-06-28 DIAGNOSIS — M54.16 LEFT LUMBAR RADICULITIS: ICD-10-CM

## 2024-06-28 DIAGNOSIS — M48.061 SPINAL STENOSIS, LUMBAR REGION WITHOUT NEUROGENIC CLAUDICATION: Primary | ICD-10-CM

## 2024-06-28 PROCEDURE — 99442 PR PHYS/QHP TELEPHONE EVALUATION 11-20 MIN: CPT | Performed by: PHYSICAL MEDICINE & REHABILITATION

## 2024-06-28 NOTE — H&P (VIEW-ONLY)
[facility]  [facility] [facility], [address]   [place] [address]   Phone: [phone]   Fax: [phone]         Patient: Lynn Cardona                                                                               MRN: [id number]         YOB: 1965           AGE: 57 y.o.               PCP: Som Oh MD      Reason for Consultation: Back Pain         HPI:   Lynn Cardona is a 57 y.o. female with relevant PMH of HTN, GERD, gastric bypass 2017 who presented with low back pain radiating down R>L leg.  She also reports neck pain which radiates into bilateral  arms.  She has tried TF epidural injections  which have been helpful and most recently 6/18/2024 she had right L4 and L5 SNRB which gave 100% relief of her right sided low back pain radiating down the right leg.  She now has started to notice increased left sided low back pain radiating down the left leg   She has seen Dr. Olsen and is considering spine surgery but would like to lose weight first .    She has seen Dr. Enriquez for knee arthritis and has tried euflexxa  She sees Nery Acosta for bilateral shoulder pain ( history rotator cuff repair on right in 2021)   Neurologic symptoms: +numbness, tingling, bilateral feet. No weakness, bowel or bladder changes.  Had a fall 4/30/2024 chipped her tooth.      Location: The pain is located in the low back pain , neck    Radiation: The pain does radiate down R>L    Pain Score: Currently: 6/10  At Best: 5/10  At Worst: 10/10    Quality: Pain is of a Burning, Cramping, Stiff, Tingling, Numbness, and Pulling quality.     Aggravating: Pain is exacerbated by walking, sitting, standing, and getting up in the morning    Alleviating: The pain is alleviated by  sitting in recliner      Prior Treatments:   Physical therapy: YES completed 8/2022 for right RTC repair   Injections:   12/6/22- right L 5 SNRB 2 weeks 60% relief    2/7/22- bilateral L5 SNRB 100 %

## 2024-06-28 NOTE — PROGRESS NOTES
Lynn Cardona presents today for   Chief Complaint   Patient presents with    Back Pain     Back pain  feeling better since procedure now having a pulling sensation on the left side    SNRB 6/18/24        Is someone accompanying this pt? NO    Is the patient using any DME equipment during OV? NO      Coordination of Care:  1. Have you been to the ER, urgent care clinic since your last visit? NO  Hospitalized since your last visit? NO    2. Have you seen or consulted any other health care providers outside of the [facility] since your last visit? NO Include any pap smears or colon screening. NO    
visit. She (or guardian if applicable) is aware that this is a billable service, which includes applicable co-pays. This visit was conducted with the patient's (and/or legal guardian's) verbal consent. She has not had a related appointment within my department in the past 7 days or scheduled within the next 24 hours.   The patient was located at Home: [address] [place] [id number].  The provider was located at Facility (Appt Dept): [address] [place] [address]  [address]  [place] [address].    Note: not billable if this call serves to triage the patient into an appointment for the relevant concern  Yes, I confirm.   Lynn Cardona is a 58 y.o. female evaluated via telephone on 6/28/2024 for Back Pain (Back pain  feeling better since procedure now having a pulling sensation on the left side//SNRB 6/18/24 )  .        Nu Sadler MD

## 2024-07-16 ENCOUNTER — HOSPITAL ENCOUNTER (OUTPATIENT)
Facility: HOSPITAL | Age: 59
Discharge: HOME | End: 2024-07-19
Payer: COMMERCIAL

## 2024-07-16 VITALS
DIASTOLIC BLOOD PRESSURE: 75 MMHG | RESPIRATION RATE: 16 BRPM | SYSTOLIC BLOOD PRESSURE: 114 MMHG | OXYGEN SATURATION: 96 % | HEART RATE: 65 BPM | TEMPERATURE: 98.3 F

## 2024-07-16 PROCEDURE — 6360000002 HC RX W HCPCS: Performed by: PHYSICAL MEDICINE & REHABILITATION

## 2024-07-16 PROCEDURE — 64484 NJX AA&/STRD TFRM EPI L/S EA: CPT | Performed by: PHYSICAL MEDICINE & REHABILITATION

## 2024-07-16 PROCEDURE — 64484 NJX AA&/STRD TFRM EPI L/S EA: CPT

## 2024-07-16 PROCEDURE — 64483 NJX AA&/STRD TFRM EPI L/S 1: CPT

## 2024-07-16 PROCEDURE — 2500000003 HC RX 250 WO HCPCS: Performed by: PHYSICAL MEDICINE & REHABILITATION

## 2024-07-16 PROCEDURE — 6370000000 HC RX 637 (ALT 250 FOR IP): Performed by: PHYSICAL MEDICINE & REHABILITATION

## 2024-07-16 PROCEDURE — 64483 NJX AA&/STRD TFRM EPI L/S 1: CPT | Performed by: PHYSICAL MEDICINE & REHABILITATION

## 2024-07-16 PROCEDURE — 6360000004 HC RX CONTRAST MEDICATION: Performed by: PHYSICAL MEDICINE & REHABILITATION

## 2024-07-16 RX ORDER — FLUTICASONE FUROATE 27.5 UG/1
SPRAY, METERED NASAL
COMMUNITY
Start: 2018-05-07 | End: 2024-07-16 | Stop reason: HOSPADM

## 2024-07-16 RX ORDER — PHENOL 1.4 %
AEROSOL, SPRAY (ML) MUCOUS MEMBRANE
COMMUNITY
Start: 2020-03-30 | End: 2024-07-16 | Stop reason: HOSPADM

## 2024-07-16 RX ORDER — LIDOCAINE HYDROCHLORIDE 10 MG/ML
30 INJECTION, SOLUTION EPIDURAL; INFILTRATION; INTRACAUDAL; PERINEURAL ONCE
Status: COMPLETED | OUTPATIENT
Start: 2024-07-16 | End: 2024-07-16

## 2024-07-16 RX ORDER — IOPAMIDOL 408 MG/ML
4 INJECTION, SOLUTION INTRATHECAL
Status: COMPLETED | OUTPATIENT
Start: 2024-07-16 | End: 2024-07-16

## 2024-07-16 RX ORDER — DIAZEPAM 5 MG/1
10 TABLET ORAL ONCE
Status: COMPLETED | OUTPATIENT
Start: 2024-07-16 | End: 2024-07-16

## 2024-07-16 RX ORDER — DIAZEPAM 5 MG/1
5 TABLET ORAL ONCE
Status: COMPLETED | OUTPATIENT
Start: 2024-07-16 | End: 2024-07-16

## 2024-07-16 RX ORDER — DIAZEPAM 5 MG/1
2.5 TABLET ORAL ONCE
Status: COMPLETED | OUTPATIENT
Start: 2024-07-16 | End: 2024-07-16

## 2024-07-16 RX ORDER — DEXAMETHASONE SODIUM PHOSPHATE 10 MG/ML
10 INJECTION, SOLUTION INTRAMUSCULAR; INTRAVENOUS ONCE
Status: COMPLETED | OUTPATIENT
Start: 2024-07-16 | End: 2024-07-16

## 2024-07-16 RX ADMIN — DEXAMETHASONE SODIUM PHOSPHATE 10 MG: 10 INJECTION, SOLUTION INTRAMUSCULAR; INTRAVENOUS at 14:09

## 2024-07-16 RX ADMIN — LIDOCAINE HYDROCHLORIDE 15 ML: 10 INJECTION, SOLUTION EPIDURAL; INFILTRATION; INTRACAUDAL; PERINEURAL at 14:03

## 2024-07-16 RX ADMIN — DIAZEPAM 10 MG: 5 TABLET ORAL at 13:34

## 2024-07-16 RX ADMIN — IOPAMIDOL 2 ML: 408 INJECTION, SOLUTION INTRATHECAL at 14:09

## 2024-07-16 ASSESSMENT — PAIN - FUNCTIONAL ASSESSMENT: PAIN_FUNCTIONAL_ASSESSMENT: 0-10

## 2024-07-16 ASSESSMENT — PAIN SCALES - GENERAL: PAINLEVEL_OUTOF10: 2

## 2024-07-16 ASSESSMENT — PAIN DESCRIPTION - DESCRIPTORS: DESCRIPTORS: TIGHTNESS

## 2024-07-16 NOTE — INTERVAL H&P NOTE
Update History & Physical    The patient's History and Physical of June 28, 2024 was reviewed. There was no change. The surgical site was confirmed by the patient and me.     Plan: The risks, benefits, expected outcome, and alternative to the recommended procedure have been discussed with the patient. Patient understands and wants to proceed with the procedure.     Electronically signed by LAURIE MEDINA MD on 7/16/2024 at 1:54 PM

## 2024-07-16 NOTE — PROCEDURES
PROCEDURE NOTE  Date: 7/16/2024   Name: Lynn Cardona  YOB: 1965    Procedures        SELECTIVE NERVE ROOT BLOCK PROCEDURE NOTE      Patient Name: Lynn Cardona  Date of Procedure: July 16, 2024  Preoperative Diagnosis:  Lumbar Radicular Pain  Post Operative Diagnosis:  Lumbar Radicular Pain  Location:  [place], [place]    Procedure :    left L4 Selective Nerve Root Block  left L5 Selective Nerve Root Block    Consent:  Informed consent was obtained prior to the procedure.  The patient was given the opportunity to ask questions regarding the procedure and its associated risks.  In addition to the potential risks associated with the procedure itself, the patient was informed both verbally and in writing of the potential side effects of the use of glucocorticoid.  The patient appeared to comprehend the informed consent and desired to have the procedure performed.        Procedure:  The patient was placed in the prone position on the fluoroscopy table and the back was prepped and draped in the usual sterile manner.  The exact spinal level was  identified using fluoroscopy, and Lidocaine 1 % was injected locally, a 22 gauge spinal needle was passed to the transverse process.  The depth was noted and the needle redirected to pass inferior and approximately one cm anterior to the transverse process.    Yes  about 1 cc of Isovue M-200 was used to verify positioning in the epidural and paravertebral space and outlined the course of the spinal nerve into the epidural space.  The same procedure was repeated at each spinal level indicated above. No vascular uptake was identified.    A total of 10 mg of preservative free Dexamethasone and 1 cc of Lidocaine/site was slowly injected.       The patient tolerated the procedure well.  The injection area was cleaned and bandaids applied.  Not excessive bleeding was noted.   Patient dressed and discharged to home with

## 2024-07-18 ENCOUNTER — TELEPHONE (OUTPATIENT)
Age: 59
End: 2024-07-18

## 2024-07-18 DIAGNOSIS — T81.89XA POST-PROCEDURAL HEADACHE, INITIAL ENCOUNTER: Primary | ICD-10-CM

## 2024-07-18 DIAGNOSIS — R51.9 POST-PROCEDURAL HEADACHE, INITIAL ENCOUNTER: Primary | ICD-10-CM

## 2024-07-18 RX ORDER — BUTALBITAL, ACETAMINOPHEN AND CAFFEINE 50; 325; 40 MG/1; MG/1; MG/1
1 TABLET ORAL EVERY 4 HOURS PRN
Qty: 20 TABLET | Refills: 0 | Status: SHIPPED | OUTPATIENT
Start: 2024-07-18

## 2024-07-29 ENCOUNTER — PATIENT MESSAGE (OUTPATIENT)
Age: 59
End: 2024-07-29

## 2024-07-30 ENCOUNTER — TELEMEDICINE (OUTPATIENT)
Age: 59
End: 2024-07-30
Payer: COMMERCIAL

## 2024-07-30 DIAGNOSIS — M54.16 RIGHT LUMBAR RADICULITIS: ICD-10-CM

## 2024-07-30 DIAGNOSIS — M54.16 LEFT LUMBAR RADICULITIS: ICD-10-CM

## 2024-07-30 DIAGNOSIS — M43.16 SPONDYLOLISTHESIS OF LUMBAR REGION: ICD-10-CM

## 2024-07-30 DIAGNOSIS — M48.061 SPINAL STENOSIS, LUMBAR REGION WITHOUT NEUROGENIC CLAUDICATION: ICD-10-CM

## 2024-07-30 DIAGNOSIS — G44.52 NEW DAILY PERSISTENT HEADACHE: Primary | ICD-10-CM

## 2024-07-30 PROCEDURE — 99442 PR PHYS/QHP TELEPHONE EVALUATION 11-20 MIN: CPT | Performed by: PHYSICAL MEDICINE & REHABILITATION

## 2024-07-30 NOTE — TELEPHONE ENCOUNTER
I contacted the pt and offered her a telephone visit for this afternoon with Dr. Sadler. The pt was identified using 2 pt identifiers. The pt accepted an appt at 4:15 pm. She is aware that the phone call will becoming from our [place] location because of the provider's location. She verbalized understanding and was also informed that they may call her earlier if she has any cancellations.

## 2024-07-30 NOTE — PROGRESS NOTES
Lynn Cardona presents today for   Chief Complaint   Patient presents with    Neck Pain     Cervical spine       Is someone accompanying this pt? no    Is the patient using any DME equipment during OV? no    Depression Screening:       No data to display                Learning Assessment:  Failed to redirect to the Timeline version of the [employer] SmartLink.    Abuse Screening:       No data to display                Fall Risk  Failed to redirect to the Timeline version of the [employer] SmartLink.    OPIOID RISK TOOL  Failed to redirect to the Timeline version of the [employer] SmartLink.    Coordination of Care:  1. Have you been to the ER, urgent care clinic since your last visit? no  Hospitalized since your last visit? no    2. Have you seen or consulted any other health care providers outside of the [facility] since your last visit? no Include any pap smears or colon screening. no          
TAKE 1 CAPSULE BY MOUTH ONE TIME PER WEEK      Azelastine HCl 137 MCG/SPRAY SOLN SPRAY 2 SPRAYS INTO EACH NOSTRIL TWICE A DAY      doxepin (SINEQUAN) 10 MG capsule TAKE 1 CAPSULE BY MOUTH 1 HR BEFORE BED      FLUoxetine (PROZAC) 10 MG capsule TAKE 1 CAPSULE BY MOUTH EVERYDAY AT BEDTIME      lisinopril-hydroCHLOROthiazide (PRINZIDE;ZESTORETIC) 20-25 MG per tablet TAKE 2 TABLETS BY MOUTH EVERY DAY      montelukast (SINGULAIR) 10 MG tablet TAKE 1 TABLET BY MOUTH EVERY DAY      zolpidem (AMBIEN CR) 12.5 MG extended release tablet TAKE 1 TABLET BY MOUTH EVERY DAY AT NIGHT       Current Facility-Administered Medications   Medication Dose Route Frequency Provider Last Rate Last Admin    sodium hyaluronate (EUFLEXXA, HYALGAN) injection 20 mg  20 mg Intra-artICUlar Once Sherrie Acosta PA        sodium hyaluronate (EUFLEXXA, HYALGAN) injection 20 mg  20 mg Intra-artICUlar Once Sherrie Acosta PA        sodium hyaluronate (EUFLEXXA, HYALGAN) injection 20 mg  20 mg Intra-artICUlar Once Sherrie Acosta PA          Allergies   Allergen Reactions    Sulfamethoxazole-Trimethoprim Itching    Amoxicillin-Pot Clavulanate Diarrhea     Severe diarrhea    Sulfa Antibiotics Itching       Assessment:   -new onset left sided headache after NELSY 7/16/2024   - lumbar spinal stenosis L4-5   -right L5 radiculopathy due to facet arthritis cyst   -Left lumbar radiculitis    - cervical spondylosis      Plan:     - discussed headaches, no clear post dural headache as only left sided and has not improved.  She has prior history of headaches, sees a neurologist for sleep apnea.  Will get MRI brain and have her follow up with her neurologist    -Physical therapy -  discussed PT, she would like to hold off as she just had extensive PT for her rotator cuff repair    -Medications -  - lyrica 150mg at night- 90 day    - baclofen 10mg in am 20mg qhs 90 day supply   Counseled regarding side effects and appropriate administration of medications.

## 2024-07-30 NOTE — TELEPHONE ENCOUNTER
From: Lynn Cardona  To: Dr. Nu Sadler  Sent: 7/29/2024 7:01 PM EDT  Subject: Ongoing headache plus    Hi Dr. Sadler,   This is Lynn Cardona; I had a right nerve block at L4 and L5 on 7/16/24 around 2:00 pm, my headache started on 7/16/24 around 6:00 pm. I called the office on 7/18/24 for persist headache and a RX for Fioricet was called into my pharmacy. At that time the headache was on left side of head long with, scalp pain, facial pain, temporal pain, earache, neck pain, neck stiffness, and dizziness with all of the pain being and remains on the left side of my head. Lying down eases the pain. But as soon as I am up moving around gains momentum the pain level ranges 3-8 out of 10. The Fioricet 1 tab every 4 hours as needed did not help me but taking 2 tabs a day at bedtime helped me some, I did not take with my nightly meds. I waited 4 hours after that to take my nightly meds. I got the earliest appointment I could to see you, which was Aug 15, 2024, at 8:30 am. These symptoms are new and not something that has happened before. The office did not offer telehealth appointment this time. I had   been monitoring my BP's and pulse ranges /82, pulse 65, /84, p-65, bp 108/69, p- 73, bp 106/65, p- 67 today. Please advise, thanks!

## 2024-08-07 ENCOUNTER — HOSPITAL ENCOUNTER (OUTPATIENT)
Facility: HOSPITAL | Age: 59
Discharge: HOME | End: 2024-08-10
Attending: PHYSICAL MEDICINE & REHABILITATION
Payer: COMMERCIAL

## 2024-08-07 DIAGNOSIS — G44.52 NEW DAILY PERSISTENT HEADACHE: ICD-10-CM

## 2024-08-07 PROCEDURE — 70551 MRI BRAIN STEM W/O DYE: CPT

## 2024-08-15 ENCOUNTER — OFFICE VISIT (OUTPATIENT)
Age: 59
End: 2024-08-15
Payer: COMMERCIAL

## 2024-08-15 VITALS — WEIGHT: 234 LBS | BODY MASS INDEX: 43.06 KG/M2 | HEIGHT: 62 IN

## 2024-08-15 DIAGNOSIS — M48.061 SPINAL STENOSIS, LUMBAR REGION WITHOUT NEUROGENIC CLAUDICATION: ICD-10-CM

## 2024-08-15 DIAGNOSIS — M43.16 SPONDYLOLISTHESIS OF LUMBAR REGION: ICD-10-CM

## 2024-08-15 PROCEDURE — G8417 CALC BMI ABV UP PARAM F/U: HCPCS | Performed by: PHYSICAL MEDICINE & REHABILITATION

## 2024-08-15 PROCEDURE — G8427 DOCREV CUR MEDS BY ELIG CLIN: HCPCS | Performed by: PHYSICAL MEDICINE & REHABILITATION

## 2024-08-15 PROCEDURE — 1036F TOBACCO NON-USER: CPT | Performed by: PHYSICAL MEDICINE & REHABILITATION

## 2024-08-15 PROCEDURE — 3017F COLORECTAL CA SCREEN DOC REV: CPT | Performed by: PHYSICAL MEDICINE & REHABILITATION

## 2024-08-15 PROCEDURE — 99214 OFFICE O/P EST MOD 30 MIN: CPT | Performed by: PHYSICAL MEDICINE & REHABILITATION

## 2024-08-15 RX ORDER — PREGABALIN 150 MG/1
150 CAPSULE ORAL NIGHTLY
Qty: 90 CAPSULE | Refills: 0 | Status: SHIPPED | OUTPATIENT
Start: 2024-08-15 | End: 2024-11-13

## 2024-08-15 RX ORDER — PREDNISONE 20 MG/1
TABLET ORAL
COMMUNITY
Start: 2024-08-10 | End: 2024-08-22

## 2024-08-15 RX ORDER — CEFPODOXIME PROXETIL 200 MG/1
200 TABLET, FILM COATED ORAL EVERY 12 HOURS
COMMUNITY
Start: 2024-08-10 | End: 2024-08-20

## 2024-08-15 RX ORDER — BACLOFEN 10 MG/1
10 TABLET ORAL 3 TIMES DAILY
Qty: 270 TABLET | Refills: 0 | Status: SHIPPED | OUTPATIENT
Start: 2024-08-15 | End: 2024-11-13

## 2024-08-15 NOTE — PROGRESS NOTES
Lynn Cardona presents today for   Chief Complaint   Patient presents with    Back Pain     Lumbar spine       Is someone accompanying this pt? no    Is the patient using any DME equipment during OV? no    Depression Screening:       No data to display                Learning Assessment:  Failed to redirect to the Timeline version of the [employer] SmartLink.    Abuse Screening:       No data to display                Fall Risk  Failed to redirect to the Timeline version of the [employer] SmartLink.    OPIOID RISK TOOL  Failed to redirect to the Timeline version of the [employer] SmartLink.    Coordination of Care:  1. Have you been to the ER, urgent care clinic since your last visit? no  Hospitalized since your last visit? no    2. Have you seen or consulted any other health care providers outside of the [facility] since your last visit? no Include any pap smears or colon screening. no          
day    - renewed baclofen 10mg in am 20mg qhs 90 day supply   Counseled regarding side effects and appropriate administration of medications.     -Diagnostics/Imaging - reviewed MRI brain   -Injections -NA  --follow up with Dr. Olsen regarding spine surgery once she has lost weight    -Lifestyle - Encouraged weight loss    -Education - The patient's diagnosis, prognosis and treatment options were discussed today. All questions were answered.     F/U in 3 months med refill             Nu Sadler MD   [facility]

## 2024-08-19 ENCOUNTER — PATIENT MESSAGE (OUTPATIENT)
Age: 59
End: 2024-08-19

## 2024-08-19 DIAGNOSIS — M75.101 UNSPECIFIED ROTATOR CUFF TEAR OR RUPTURE OF RIGHT SHOULDER, NOT SPECIFIED AS TRAUMATIC: ICD-10-CM

## 2024-11-11 NOTE — PROGRESS NOTES
Lynn Cardona  1965   No chief complaint on file.       HISTORY OF PRESENT ILLNESS  Lynn Cardona is a 59 y.o. female who presents today for reevaluation of bilateral knee pain(R>L). Pain is a 3/10. Patient received a left knee cortisone injection on 02/27/2024. Pain increases with prolonged walking. She is not ready to undergo surgery. She has tried gel injections with benefit.  Having great difficulty with walking for any length of time and getting up from a sitting position.    Patient denies any fever, chills, chest pain, shortness of breath or calf pain. The remainder of the review of systems is negative. There are no new illness or injuries other than that mentioned above to report since last seen in the office. No changes in medications, allergies, social or family history.      PHYSICAL EXAM:   Ht 1.575 m (5' 2\")   Wt 106.1 kg (234 lb)   BMI 42.80 kg/m²   The patient is a well-developed, well-nourished female   in no acute distress.  The patient is alert and oriented times three.  The patient is alert and oriented times three. Mood and affect are normal.  LYMPHATIC: lymph nodes are not enlarged and are within normal limits  SKIN: normal in color and non tender to palpation. There are no bruises or abrasions noted.   NEUROLOGICAL: Motor sensory exam is within normal limits. Reflexes are equal bilaterally. There is normal sensation to pinprick and light touch  MUSCULOSKELETAL:  Examination Left knee Right knee   Skin Intact Intact   Range of motion     Effusion + +   Medial joint line tenderness + +   Lateral joint line tenderness + +   Tenderness Pes Bursa - -   Tenderness insertion MCL - -   Tenderness insertion LCL - -   Nell’s - -   Patella crepitus + +   Patella grind + +   Lachman - -   Pivot shift - -   Anterior drawer - -   Posterior drawer - -   Varus stress - -   Valgus stress - -   Neurovascular Intact Intact   Calf Swelling and Tenderness to Palpation - -   Marion's

## 2024-11-12 ENCOUNTER — OFFICE VISIT (OUTPATIENT)
Age: 59
End: 2024-11-12

## 2024-11-12 VITALS — WEIGHT: 234 LBS | BODY MASS INDEX: 43.06 KG/M2 | HEIGHT: 62 IN

## 2024-11-12 DIAGNOSIS — M17.12 PRIMARY OSTEOARTHRITIS OF LEFT KNEE: Primary | ICD-10-CM

## 2024-11-12 DIAGNOSIS — M17.11 PRIMARY OSTEOARTHRITIS OF RIGHT KNEE: ICD-10-CM

## 2024-11-12 RX ORDER — LIDOCAINE HYDROCHLORIDE 10 MG/ML
8 INJECTION, SOLUTION INFILTRATION; PERINEURAL ONCE
Status: COMPLETED | OUTPATIENT
Start: 2024-11-12 | End: 2024-11-12

## 2024-11-12 RX ORDER — TRIAMCINOLONE ACETONIDE 40 MG/ML
80 INJECTION, SUSPENSION INTRA-ARTICULAR; INTRAMUSCULAR ONCE
Status: COMPLETED | OUTPATIENT
Start: 2024-11-12 | End: 2024-11-12

## 2024-11-12 RX ADMIN — TRIAMCINOLONE ACETONIDE 80 MG: 40 INJECTION, SUSPENSION INTRA-ARTICULAR; INTRAMUSCULAR at 10:01

## 2024-11-12 RX ADMIN — LIDOCAINE HYDROCHLORIDE 8 ML: 10 INJECTION, SOLUTION INFILTRATION; PERINEURAL at 10:00

## 2024-11-13 NOTE — H&P (VIEW-ONLY)
L4-L5.  2.  Multifactorial L4-L5 severe spinal canal stenosis. Right synovial cyst at  this level likely impinges upon the L5 nerve root.  3.  Additional multifactorial moderate stenosis at L3-L4 and L2-L3.    27 minutes of face-to-face contact were spent with the patient during today's visit extensively discussing symptoms and treatment plan.  All questions were answered. More than half of this visit today was spent on counseling.      By signing my name below, I, MAURICE Odonnell, attest that this documentation has been prepared under the direction and in the presence of Yonathan Zafar MD  Electronically signed: MAURICE Odonnell. 11/20/24 9:49 AM EST     I, Yonathan Zafar MD, personally performed the services described in this documentation. I have authorized the scribe to complete the medical record entries input within this chart. I have reviewed the chart and agree that the record reflects my personal performance and is accurate and complete. [Electronically Signed: Yonathan Zafar MD. 11/20/2024 3:49 PM]

## 2024-11-20 ENCOUNTER — OFFICE VISIT (OUTPATIENT)
Age: 59
End: 2024-11-20
Payer: COMMERCIAL

## 2024-11-20 VITALS
RESPIRATION RATE: 16 BRPM | HEART RATE: 76 BPM | HEIGHT: 62 IN | SYSTOLIC BLOOD PRESSURE: 103 MMHG | TEMPERATURE: 97.1 F | BODY MASS INDEX: 42.99 KG/M2 | DIASTOLIC BLOOD PRESSURE: 69 MMHG | WEIGHT: 233.6 LBS

## 2024-11-20 DIAGNOSIS — M25.511 CHRONIC RIGHT SHOULDER PAIN: ICD-10-CM

## 2024-11-20 DIAGNOSIS — M43.16 SPONDYLOLISTHESIS OF LUMBAR REGION: ICD-10-CM

## 2024-11-20 DIAGNOSIS — G89.29 CHRONIC PRIMARY MUSCULOSKELETAL PAIN: ICD-10-CM

## 2024-11-20 DIAGNOSIS — G89.29 CHRONIC RIGHT SHOULDER PAIN: ICD-10-CM

## 2024-11-20 DIAGNOSIS — M79.18 CHRONIC PRIMARY MUSCULOSKELETAL PAIN: ICD-10-CM

## 2024-11-20 DIAGNOSIS — M48.062 SPINAL STENOSIS OF LUMBAR REGION WITH NEUROGENIC CLAUDICATION: Primary | ICD-10-CM

## 2024-11-20 DIAGNOSIS — M54.16 LUMBAR RADICULOPATHY: ICD-10-CM

## 2024-11-20 PROCEDURE — 3078F DIAST BP <80 MM HG: CPT | Performed by: PHYSICAL MEDICINE & REHABILITATION

## 2024-11-20 PROCEDURE — 99213 OFFICE O/P EST LOW 20 MIN: CPT | Performed by: PHYSICAL MEDICINE & REHABILITATION

## 2024-11-20 PROCEDURE — G8484 FLU IMMUNIZE NO ADMIN: HCPCS | Performed by: PHYSICAL MEDICINE & REHABILITATION

## 2024-11-20 PROCEDURE — 3017F COLORECTAL CA SCREEN DOC REV: CPT | Performed by: PHYSICAL MEDICINE & REHABILITATION

## 2024-11-20 PROCEDURE — 3074F SYST BP LT 130 MM HG: CPT | Performed by: PHYSICAL MEDICINE & REHABILITATION

## 2024-11-20 PROCEDURE — G8427 DOCREV CUR MEDS BY ELIG CLIN: HCPCS | Performed by: PHYSICAL MEDICINE & REHABILITATION

## 2024-11-20 PROCEDURE — G8417 CALC BMI ABV UP PARAM F/U: HCPCS | Performed by: PHYSICAL MEDICINE & REHABILITATION

## 2024-11-20 PROCEDURE — 1036F TOBACCO NON-USER: CPT | Performed by: PHYSICAL MEDICINE & REHABILITATION

## 2024-11-20 RX ORDER — BACLOFEN 10 MG/1
10 TABLET ORAL 3 TIMES DAILY
Qty: 270 TABLET | Refills: 1 | Status: SHIPPED | OUTPATIENT
Start: 2024-11-20 | End: 2025-05-19

## 2024-11-20 RX ORDER — PREGABALIN 225 MG/1
225 CAPSULE ORAL NIGHTLY
Qty: 90 CAPSULE | Refills: 1 | Status: SHIPPED | OUTPATIENT
Start: 2024-11-20 | End: 2025-05-19

## 2024-11-20 ASSESSMENT — ENCOUNTER SYMPTOMS
TROUBLE SWALLOWING: 0
NAUSEA: 0
VOMITING: 0
WHEEZING: 0
SHORTNESS OF BREATH: 0
BACK PAIN: 1

## 2024-11-20 NOTE — PATIENT INSTRUCTIONS
Ghassan Krishnan's Big three exercises link:  [email or web address]

## 2024-11-26 ENCOUNTER — CLINICAL DOCUMENTATION (OUTPATIENT)
Age: 59
End: 2024-11-26

## 2024-11-26 NOTE — PROGRESS NOTES
Returned call to patient regarding scheduling SNRB injection.  Message was left advising that we are proceeding with obtaining insurance authorization for procedure to be done at [facility] and will let her know once auth is received so that we can schedule for either 12/10 or 12/17/24.

## 2024-11-27 ENCOUNTER — TELEPHONE (OUTPATIENT)
Age: 59
End: 2024-11-27

## 2024-11-27 NOTE — TELEPHONE ENCOUNTER
Cathie from [facility] is requesting clinic notes     Phone [phone]  Fax [phone]

## 2024-12-17 ENCOUNTER — HOSPITAL ENCOUNTER (OUTPATIENT)
Facility: HOSPITAL | Age: 59
Discharge: HOME | End: 2024-12-20
Payer: COMMERCIAL

## 2024-12-17 VITALS
TEMPERATURE: 98.4 F | DIASTOLIC BLOOD PRESSURE: 87 MMHG | OXYGEN SATURATION: 95 % | RESPIRATION RATE: 16 BRPM | HEART RATE: 82 BPM | SYSTOLIC BLOOD PRESSURE: 122 MMHG

## 2024-12-17 PROCEDURE — 6370000000 HC RX 637 (ALT 250 FOR IP): Performed by: PHYSICAL MEDICINE & REHABILITATION

## 2024-12-17 PROCEDURE — 64484 NJX AA&/STRD TFRM EPI L/S EA: CPT | Performed by: PHYSICAL MEDICINE & REHABILITATION

## 2024-12-17 PROCEDURE — 6360000004 HC RX CONTRAST MEDICATION: Performed by: PHYSICAL MEDICINE & REHABILITATION

## 2024-12-17 PROCEDURE — 64484 NJX AA&/STRD TFRM EPI L/S EA: CPT

## 2024-12-17 PROCEDURE — 64483 NJX AA&/STRD TFRM EPI L/S 1: CPT | Performed by: PHYSICAL MEDICINE & REHABILITATION

## 2024-12-17 PROCEDURE — 6360000002 HC RX W HCPCS: Performed by: PHYSICAL MEDICINE & REHABILITATION

## 2024-12-17 PROCEDURE — 64483 NJX AA&/STRD TFRM EPI L/S 1: CPT

## 2024-12-17 RX ORDER — DEXAMETHASONE SODIUM PHOSPHATE 10 MG/ML
10 INJECTION, SOLUTION INTRAMUSCULAR; INTRAVENOUS ONCE
Status: DISCONTINUED | OUTPATIENT
Start: 2024-12-19 | End: 2024-12-17

## 2024-12-17 RX ORDER — DEXAMETHASONE SODIUM PHOSPHATE 10 MG/ML
10 INJECTION, SOLUTION INTRAMUSCULAR; INTRAVENOUS ONCE
Status: COMPLETED | OUTPATIENT
Start: 2024-12-17 | End: 2024-12-17

## 2024-12-17 RX ORDER — DIAZEPAM 5 MG/1
10 TABLET ORAL ONCE
Status: DISCONTINUED | OUTPATIENT
Start: 2024-12-19 | End: 2024-12-17

## 2024-12-17 RX ORDER — IOPAMIDOL 408 MG/ML
4 INJECTION, SOLUTION INTRATHECAL
Status: COMPLETED | OUTPATIENT
Start: 2024-12-17 | End: 2024-12-17

## 2024-12-17 RX ORDER — DIAZEPAM 5 MG/1
2.5 TABLET ORAL ONCE
Status: COMPLETED | OUTPATIENT
Start: 2024-12-17 | End: 2024-12-17

## 2024-12-17 RX ORDER — LIDOCAINE HYDROCHLORIDE 10 MG/ML
30 INJECTION, SOLUTION EPIDURAL; INFILTRATION; INTRACAUDAL; PERINEURAL ONCE
Status: DISCONTINUED | OUTPATIENT
Start: 2024-12-19 | End: 2024-12-17

## 2024-12-17 RX ORDER — DIAZEPAM 5 MG/1
2.5 TABLET ORAL ONCE
Status: DISCONTINUED | OUTPATIENT
Start: 2024-12-19 | End: 2024-12-17

## 2024-12-17 RX ORDER — DIAZEPAM 5 MG/1
5 TABLET ORAL ONCE
Status: DISCONTINUED | OUTPATIENT
Start: 2024-12-19 | End: 2024-12-17

## 2024-12-17 RX ORDER — DIAZEPAM 5 MG/1
5 TABLET ORAL ONCE
Status: COMPLETED | OUTPATIENT
Start: 2024-12-17 | End: 2024-12-17

## 2024-12-17 RX ORDER — LIDOCAINE HYDROCHLORIDE 10 MG/ML
30 INJECTION, SOLUTION EPIDURAL; INFILTRATION; INTRACAUDAL; PERINEURAL ONCE
Status: COMPLETED | OUTPATIENT
Start: 2024-12-17 | End: 2024-12-17

## 2024-12-17 RX ORDER — IOPAMIDOL 408 MG/ML
4 INJECTION, SOLUTION INTRATHECAL
Status: DISCONTINUED | OUTPATIENT
Start: 2024-12-19 | End: 2024-12-17

## 2024-12-17 RX ORDER — DIAZEPAM 5 MG/1
10 TABLET ORAL ONCE
Status: COMPLETED | OUTPATIENT
Start: 2024-12-17 | End: 2024-12-17

## 2024-12-17 RX ADMIN — DEXAMETHASONE SODIUM PHOSPHATE 10 MG: 10 INJECTION, SOLUTION INTRAMUSCULAR; INTRAVENOUS at 09:37

## 2024-12-17 RX ADMIN — LIDOCAINE HYDROCHLORIDE 20 ML: 10 INJECTION, SOLUTION EPIDURAL; INFILTRATION; INTRACAUDAL; PERINEURAL at 09:32

## 2024-12-17 RX ADMIN — DIAZEPAM 10 MG: 5 TABLET ORAL at 09:14

## 2024-12-17 RX ADMIN — IOPAMIDOL 2 ML: 408 INJECTION, SOLUTION INTRATHECAL at 09:36

## 2024-12-17 ASSESSMENT — PAIN - FUNCTIONAL ASSESSMENT: PAIN_FUNCTIONAL_ASSESSMENT: 0-10

## 2024-12-17 ASSESSMENT — PAIN DESCRIPTION - DESCRIPTORS: DESCRIPTORS: PRESSURE;PINS AND NEEDLES

## 2024-12-17 ASSESSMENT — PAIN SCALES - GENERAL: PAINLEVEL_OUTOF10: 1

## 2024-12-17 NOTE — INTERVAL H&P NOTE
Update History & Physical    The patient's History and Physical of November 20, 2024 was reviewed. There was no change. The surgical site was confirmed by the patient and me.     Plan: The risks, benefits, expected outcome, and alternative to the recommended procedure have been discussed with the patient. Patient understands and wants to proceed with the procedure.     Electronically signed by LAURIE MEDINA MD on 12/17/2024 at 9:20 AM

## 2024-12-17 NOTE — PROCEDURES
instructions.    Discussion: The patient tolerated the procedure well. Patient reported bernadette-procedural pain on Visual Analog Scale:  pre-5; post-2.                                              LAURIE MEDINA MD  December 17, 2024

## 2025-02-11 ENCOUNTER — OFFICE VISIT (OUTPATIENT)
Age: 60
End: 2025-02-11

## 2025-02-11 DIAGNOSIS — M17.11 PRIMARY OSTEOARTHRITIS OF RIGHT KNEE: ICD-10-CM

## 2025-02-11 DIAGNOSIS — M17.12 PRIMARY OSTEOARTHRITIS OF LEFT KNEE: Primary | ICD-10-CM

## 2025-02-11 RX ORDER — HYALURONATE SODIUM 10 MG/ML
20 SYRINGE (ML) INTRAARTICULAR ONCE
Status: COMPLETED | OUTPATIENT
Start: 2025-02-11 | End: 2025-02-11

## 2025-02-11 RX ADMIN — Medication 20 MG: at 10:24

## 2025-02-11 NOTE — PROGRESS NOTES
Patient: Lynn Cardona                MRN: [id number]       SSN: [id number]  YOB: 1965        AGE: 59 y.o.        SEX: female  There is no height or weight on file to calculate BMI.    PCP: Som Oh MD  02/11/25    No chief complaint on file.      HISTORY:  Lynn Cardona is a 59 y.o. female who is seen for reevaluation of Bilateral knee here for 1st injection of euflexxa    PROCEDURE: Bilateral  knee Injection with Ultrasound Guidance    Indication:Bilateral knee pain/swelling    After sterile prep, 2 cc of euflexxa were injected into the Bilateral Knee. Intra-articular Ultrasound images captured using SMS G[employer]Shup Ultrasound machine using a frequency of 10 MHz with a linear transducer and scanned into patient's chart.        [facility] - [facility]  OFFICE PROCEDURE PROGRESS NOTE        Chart reviewed for the following:   Darren GUERIN MD, have reviewed the History, Physical and updated the Allergic reactions for yLnn Cardona     TIME OUT performed immediately prior to start of procedure:   Darren GUERIN MD, have performed the following reviews on Lynn Cardona prior to the start of the procedure:            * Patient was identified by name and date of birth   * Agreement on procedure being performed was verified  * Risks and Benefits explained to the patient  * Procedure site verified and marked as necessary  * Patient was positioned for comfort  * Consent was signed and verified     Time: 7:27 AM       Date of procedure: 2/11/2025    Procedure performed by:  Darren Samaniego MD    Provider assisted by: None     How tolerated by patient: tolerated the procedure well with no complications    Comments: none    IMPRESSION: No diagnosis found.     PLAN:  Ms. Cardona will return in one week for her second euflexxa injection.      By signing my name below, MAURICE Philip, attest that this documentation has been prepared under the

## 2025-02-18 ENCOUNTER — OFFICE VISIT (OUTPATIENT)
Age: 60
End: 2025-02-18
Payer: COMMERCIAL

## 2025-02-18 VITALS — HEIGHT: 62 IN | BODY MASS INDEX: 42.73 KG/M2 | RESPIRATION RATE: 16 BRPM

## 2025-02-18 DIAGNOSIS — M17.12 PRIMARY OSTEOARTHRITIS OF LEFT KNEE: Primary | ICD-10-CM

## 2025-02-18 DIAGNOSIS — M17.11 PRIMARY OSTEOARTHRITIS OF RIGHT KNEE: ICD-10-CM

## 2025-02-18 PROCEDURE — 20611 DRAIN/INJ JOINT/BURSA W/US: CPT | Performed by: ORTHOPAEDIC SURGERY

## 2025-02-18 RX ORDER — HYALURONATE SODIUM 10 MG/ML
20 SYRINGE (ML) INTRAARTICULAR ONCE
Status: COMPLETED | OUTPATIENT
Start: 2025-02-18 | End: 2025-02-18

## 2025-02-18 RX ADMIN — Medication 20 MG: at 13:10

## 2025-02-18 NOTE — PROGRESS NOTES
Patient: Lynn Cardona                MRN: [id number]       SSN: [id number]  YOB: 1965        AGE: 59 y.o.        SEX: female  There is no height or weight on file to calculate BMI.    PCP: Som Oh MD  02/18/25    No chief complaint on file.      HISTORY:  Lynn Cardona is a 59 y.o. female who is seen for reevaluation of Bilateral knee here for 2nd injection of euflexxa    PROCEDURE: Bilateral  knee Injection with Ultrasound Guidance    Indication:Bilateral knee pain/swelling    After sterile prep, 2 cc of euflexxa were injected into the Bilateral Knee. Intra-articular Ultrasound images captured using [employer] Ultrasound machine using a frequency of 10 MHz with a linear transducer and scanned into patient's chart.        [facility] - [facility]  OFFICE PROCEDURE PROGRESS NOTE        Chart reviewed for the following:   Darren GUERIN MD, have reviewed the History, Physical and updated the Allergic reactions for Lynn Cardona     TIME OUT performed immediately prior to start of procedure:   Darren GUERIN MD, have performed the following reviews on Lynn Cardona prior to the start of the procedure:            * Patient was identified by name and date of birth   * Agreement on procedure being performed was verified  * Risks and Benefits explained to the patient  * Procedure site verified and marked as necessary  * Patient was positioned for comfort  * Consent was signed and verified     Time: 12:17 PM       Date of procedure: 2/18/2025    Procedure performed by:  Darren Samaniego MD    Provider assisted by: None     How tolerated by patient: tolerated the procedure well with no complications    Comments: none    IMPRESSION: No diagnosis found.     PLAN:  Ms. Cardona will return in one week for her third euflexxa injection.      By signing my name below, MAURICE Philip, attest that this documentation has been prepared under the

## 2025-02-24 ENCOUNTER — PATIENT MESSAGE (OUTPATIENT)
Age: 60
End: 2025-02-24

## 2025-02-24 NOTE — TELEPHONE ENCOUNTER
See mychart response   Burow's Graft Text: The defect edges were debeveled with a #15 scalpel blade.  Given the location of the defect, shape of the defect, the proximity to free margins and the presence of a standing cone deformity a Burow's skin graft was deemed most appropriate. The standing cone was removed and this tissue was then trimmed to the shape of the primary defect. The adipose tissue was also removed until only dermis and epidermis were left.  The skin margins of the secondary defect were undermined to an appropriate distance in all directions utilizing iris scissors.  The secondary defect was closed with interrupted buried subcutaneous sutures.  The skin edges were then re-apposed with running  sutures.  The skin graft was then placed in the primary defect and oriented appropriately.

## 2025-02-26 ENCOUNTER — OFFICE VISIT (OUTPATIENT)
Age: 60
End: 2025-02-26

## 2025-02-26 VITALS — HEIGHT: 62 IN | BODY MASS INDEX: 42.88 KG/M2 | WEIGHT: 233 LBS

## 2025-02-26 DIAGNOSIS — M17.12 PRIMARY OSTEOARTHRITIS OF LEFT KNEE: ICD-10-CM

## 2025-02-26 DIAGNOSIS — M17.11 PRIMARY OSTEOARTHRITIS OF RIGHT KNEE: Primary | ICD-10-CM

## 2025-02-26 DIAGNOSIS — E66.01 MORBID OBESITY: ICD-10-CM

## 2025-02-26 RX ORDER — HYALURONATE SODIUM 10 MG/ML
20 SYRINGE (ML) INTRAARTICULAR ONCE
Status: COMPLETED | OUTPATIENT
Start: 2025-02-26 | End: 2025-02-26

## 2025-02-26 RX ADMIN — Medication 20 MG: at 17:05

## 2025-02-26 NOTE — PROGRESS NOTES
Patient: Lynn Cardona                MRN: [id number]       SSN: [id number]  YOB: 1965        AGE: 59 y.o.        SEX: female  Body mass index is 42.62 kg/m².    PCP: Som Oh MD  02/26/25    Chief Complaint   Patient presents with    Injections     Bilateral knee Euflexxa #3       HISTORY:  Lynn Cardona is a 59 y.o. female who is seen for reevaluation of Bilateral knee here for 3rd injection of euflexxa    PROCEDURE: Bilateral  knee Injection with Ultrasound Guidance    Indication:Bilateralknee pain/swelling    After sterile prep, 2 cc of euflexxa were injected into the Bilateral Knee. Intra-articular Ultrasound images captured using Eclector Ultrasound machine using a frequency of 10 MHz with a linear transducer and scanned into patient's chart.         OFFICE PROCEDURE PROGRESS NOTE        Chart reviewed for the following:   Haylee GUERIN ACNP-C, have reviewed the History, Physical and updated the Allergic reactions for Lynn Cardona     TIME OUT performed immediately prior to start of procedure:   Haylee GUERIN ACNP-C, have performed the following reviews on Lynn Cardona prior to the start of the procedure:            * Patient was identified by name and date of birth   * Agreement on procedure being performed was verified  * Risks and Benefits explained to the patient  * Procedure site verified and marked as necessary  * Patient was positioned for comfort  * Consent was signed and verified     Time: 4:34 PM       Date of procedure: 2/26/2025    Procedure performed by:  NONI Herbert    Provider assisted by: None     How tolerated by patient: tolerated the procedure well with no complications    Comments: none    IMPRESSION:     ICD-10-CM    1. Primary osteoarthritis of right knee  M17.11       2. Primary osteoarthritis of left knee  M17.12       3. Morbid obesity  E66.01            PLAN:  Ms. Cardona has completed their viscosupplementation

## 2025-03-18 ENCOUNTER — OFFICE VISIT (OUTPATIENT)
Age: 60
End: 2025-03-18
Payer: COMMERCIAL

## 2025-03-18 VITALS
HEART RATE: 65 BPM | BODY MASS INDEX: 45.08 KG/M2 | WEIGHT: 245 LBS | TEMPERATURE: 98.3 F | SYSTOLIC BLOOD PRESSURE: 113 MMHG | DIASTOLIC BLOOD PRESSURE: 79 MMHG | HEIGHT: 62 IN

## 2025-03-18 DIAGNOSIS — M25.511 CHRONIC RIGHT SHOULDER PAIN: ICD-10-CM

## 2025-03-18 DIAGNOSIS — M43.16 SPONDYLOLISTHESIS OF LUMBAR REGION: ICD-10-CM

## 2025-03-18 DIAGNOSIS — G89.29 CHRONIC RIGHT SHOULDER PAIN: ICD-10-CM

## 2025-03-18 DIAGNOSIS — G89.29 CHRONIC PRIMARY MUSCULOSKELETAL PAIN: ICD-10-CM

## 2025-03-18 DIAGNOSIS — M54.16 LUMBAR RADICULOPATHY: ICD-10-CM

## 2025-03-18 DIAGNOSIS — M48.062 SPINAL STENOSIS OF LUMBAR REGION WITH NEUROGENIC CLAUDICATION: Primary | ICD-10-CM

## 2025-03-18 DIAGNOSIS — M79.18 CHRONIC PRIMARY MUSCULOSKELETAL PAIN: ICD-10-CM

## 2025-03-18 PROCEDURE — G8427 DOCREV CUR MEDS BY ELIG CLIN: HCPCS | Performed by: PHYSICAL MEDICINE & REHABILITATION

## 2025-03-18 PROCEDURE — 3074F SYST BP LT 130 MM HG: CPT | Performed by: PHYSICAL MEDICINE & REHABILITATION

## 2025-03-18 PROCEDURE — 1036F TOBACCO NON-USER: CPT | Performed by: PHYSICAL MEDICINE & REHABILITATION

## 2025-03-18 PROCEDURE — 3017F COLORECTAL CA SCREEN DOC REV: CPT | Performed by: PHYSICAL MEDICINE & REHABILITATION

## 2025-03-18 PROCEDURE — 3078F DIAST BP <80 MM HG: CPT | Performed by: PHYSICAL MEDICINE & REHABILITATION

## 2025-03-18 PROCEDURE — G8417 CALC BMI ABV UP PARAM F/U: HCPCS | Performed by: PHYSICAL MEDICINE & REHABILITATION

## 2025-03-18 PROCEDURE — 99213 OFFICE O/P EST LOW 20 MIN: CPT | Performed by: PHYSICAL MEDICINE & REHABILITATION

## 2025-03-18 RX ORDER — CETIRIZINE HYDROCHLORIDE 10 MG/1
10 TABLET ORAL DAILY
COMMUNITY

## 2025-03-18 RX ORDER — PREGABALIN 225 MG/1
225 CAPSULE ORAL NIGHTLY
Qty: 90 CAPSULE | Refills: 1 | Status: SHIPPED | OUTPATIENT
Start: 2025-03-18 | End: 2025-09-14

## 2025-03-18 RX ORDER — FAMOTIDINE 20 MG/1
40 TABLET, FILM COATED ORAL DAILY
COMMUNITY

## 2025-03-18 RX ORDER — BACLOFEN 10 MG/1
10 TABLET ORAL 3 TIMES DAILY
Qty: 270 TABLET | Refills: 1 | Status: SHIPPED | OUTPATIENT
Start: 2025-03-18 | End: 2025-09-14

## 2025-03-18 ASSESSMENT — ENCOUNTER SYMPTOMS
TROUBLE SWALLOWING: 0
VOMITING: 0
SHORTNESS OF BREATH: 0
NAUSEA: 0
BACK PAIN: 1
WHEEZING: 0

## 2025-03-18 NOTE — PROGRESS NOTES
Lynn Cardona presents today for   Chief Complaint   Patient presents with    Lower Back Pain       Is someone accompanying this pt? no    Is the patient using any DME equipment during OV? no    Coordination of Care:  1. Have you been to the ER, urgent care clinic since your last visit? no  Hospitalized since your last visit? no    2. Have you seen or consulted any other health care providers outside of the [facility] since your last visit? Yes, ortho Include any pap smears or colon screening. no    
stenosis     C7/T1:  No central or neural foraminal stenosis     IMPRESSION:     Mild to moderate degenerative changes most pronounced at C5-6.     Lumbar MRI images taken on 10/27/22 were personally reviewed with patient:  FINDINGS:   5 lumbar type vertebral bodies with numbering based off of S1 with first sacral  type morphology. T12 with last rib-bearing vertebra.  Alignment: Preserved lumbar lordosis. Minimal anterolisthesis of L4 on L5.   Vertebral body height: Preserved  Marrow signal: Within normal limits.  Conus: Terminates at the L1-L2 disc space     Axial imaging correlation:  L1-2: No significant disc displacement. Preserved disc height and hydration.  Mild hypertrophic facet arthropathy, thickening of ligamentum flavum narrows the  posterior thecal space. No significant neural foraminal or central canal  stenosis.      L2-3: Mild loss of normal disc hydration. Minimal broad-based disc ridging  flattens the ventral thecal space. Moderate hypertrophic facet arthropathy,  thickening of ligamentum flavum effaces the posterior thecal space resulting in  moderate spinal canal stenosis. Small bilateral facet joint effusions. Mild  bilateral neural foraminal stenosis.     L3-4: Broad-based disc bulge flattens the ventral thecal space. Moderate  bilateral hypertrophic facet arthropathy, thickening of ligament flavum effaces  the posterior thecal space resulting in moderate spinal canal stenosis. Mild  bilateral neural foraminal stenosis.     L4-5: Broad-based disc bulge flattens the ventral thecal space. Advanced  bilateral hypertrophic facet arthropathy, thickening of ligamentum flavum  effaces the posterior thecal space resulting in severe spinal canal stenosis.  Right 4 mm synovial cyst extends into the right lateral recess, likely resulting  in compression of the descending L5 nerve root. Bilateral facet joint effusions.  Moderate right and mild left neural foraminal stenosis.     L5-S1: Minimal broad-based

## 2025-05-14 ENCOUNTER — OFFICE VISIT (OUTPATIENT)
Age: 60
End: 2025-05-14

## 2025-05-14 DIAGNOSIS — G89.29 CHRONIC PRIMARY MUSCULOSKELETAL PAIN: ICD-10-CM

## 2025-05-14 DIAGNOSIS — E66.01 MORBID OBESITY (HCC): ICD-10-CM

## 2025-05-14 DIAGNOSIS — M17.11 PRIMARY OSTEOARTHRITIS OF RIGHT KNEE: Primary | ICD-10-CM

## 2025-05-14 DIAGNOSIS — M79.18 CHRONIC PRIMARY MUSCULOSKELETAL PAIN: ICD-10-CM

## 2025-05-14 RX ORDER — TRIAMCINOLONE ACETONIDE 40 MG/ML
40 INJECTION, SUSPENSION INTRA-ARTICULAR; INTRAMUSCULAR ONCE
Status: COMPLETED | OUTPATIENT
Start: 2025-05-14 | End: 2025-05-14

## 2025-05-14 RX ADMIN — TRIAMCINOLONE ACETONIDE 40 MG: 40 INJECTION, SUSPENSION INTRA-ARTICULAR; INTRAMUSCULAR at 16:12

## 2025-05-14 NOTE — PROGRESS NOTES
Lynn Cardona  1965   No chief complaint on file.       HISTORY OF PRESENT ILLNESS  Lynn Cardona is a 59 y.o. female who presents today for reevaluation of right sided knee pain. She completed a Euflexxa on both knees in February. Her left knee is feeling better but the right knee is still bothering her. She also notes re-injury 1 month ago when her knee twisted while she was getting out of a car. Pain is a 8/10.    Patient denies any fever, chills, chest pain, shortness of breath or calf pain. The remainder of the review of systems is negative. There are no new illness or injuries other than that mentioned above to report since last seen in the office. No changes in medications, allergies, social or family history.      PHYSICAL EXAM:   There were no vitals taken for this visit.  The patient is a well-developed, well-nourished female   in no acute distress.  The patient is alert and oriented times three.  The patient is alert and oriented times three. Mood and affect are normal.  LYMPHATIC: lymph nodes are not enlarged and are within normal limits  SKIN: normal in color and non tender to palpation. There are no bruises or abrasions noted.   NEUROLOGICAL: Motor sensory exam is within normal limits. Reflexes are equal bilaterally. There is normal sensation to pinprick and light touch  MUSCULOSKELETAL:  Examination Right knee   Skin Intact   Range of motion    Effusion +   Medial joint line tenderness +   Lateral joint line tenderness +   Tenderness Pes Bursa -   Tenderness insertion MCL -   Tenderness insertion LCL -   Nell’s -   Patella crepitus +   Patella grind +   Lachman -   Pivot shift -   Anterior drawer -   Posterior drawer -   Varus stress -   Valgus stress -   Neurovascular Intact   Calf Swelling and Tenderness to Palpation -   Marion's Test -   Hamstring Cord Tightness -            PROCEDURE: right knee Injection with Ultrasound Guidance    Indication:  right knee

## 2025-06-18 NOTE — PROGRESS NOTES
[facility]  [facility] [facility], [address]  [place] [address]  Phone: [phone]  Fax: [phone]      Lynn Cardona  : 1965  PCP: Som Oh MD  2025    PROGRESS NOTE    HISTORY OF PRESENT ILLNESS    8/15/24 (Dr. Sadler)  C/o low back pain radiating down R>L leg.  She also reports neck pain which radiates into bilateral  arms.    She has tried TF epidural injections  which have been helpful and most recently 2024 she had right L4 and L5 SNRB which gave 100% relief   She now has started to notice increased left sided low back pain radiating down the left leg.   She tried a left L4 and L5 SNRB 2024 which helped with her low back pain pain radiating down her leg but after the injection she developed an intense left sided headache.    She called the office and Dr. Pickard prescribed Fioricet with some benefit.  She reports left sided headache, scalp, facial, temporal pain, earache, neck stiffness and dizziness on left side.  Lying down eases the pain, but standing the pain is a 3-8/10.    We got an Mri of her brain which was essentially normal and her headaches have resolved.    She has seen Dr. Olsen and is considering spine surgery but would like to lose weight first .   She has seen Dr. Enriquez for knee arthritis and has tried euflexxa  She has seen Nery Acosta for bilateral shoulder pain ( history rotator cuff repair on right in )  PLAN: Lyrica 150mg QHS; Refill Baclofen 10mg/20mg; F/u with Dr. Olsen      Prior Treatments:   Physical therapy: YES completed 2022 for right RTC repair   Injections:   22- right L 5 SNRB 2 weeks 60% relief    22- bilateral L5 SNRB 100 % relief for 2 weeks   2023 bilateral L5 SNRB- 3 days relief  2024- right l4 and L5 SNRB 100% right on the right side   2024- left L4 and L5 SNRB- helped back pain       Previous Medications: medrol pack , gabapentin    Current Medications:

## 2025-06-24 ENCOUNTER — OFFICE VISIT (OUTPATIENT)
Age: 60
End: 2025-06-24
Payer: COMMERCIAL

## 2025-06-24 VITALS — BODY MASS INDEX: 45.91 KG/M2 | HEIGHT: 62 IN | WEIGHT: 249.5 LBS | RESPIRATION RATE: 16 BRPM

## 2025-06-24 DIAGNOSIS — M43.16 SPONDYLOLISTHESIS OF LUMBAR REGION: ICD-10-CM

## 2025-06-24 DIAGNOSIS — M54.16 LUMBAR RADICULOPATHY: ICD-10-CM

## 2025-06-24 DIAGNOSIS — M48.062 SPINAL STENOSIS OF LUMBAR REGION WITH NEUROGENIC CLAUDICATION: Primary | ICD-10-CM

## 2025-06-24 DIAGNOSIS — M25.511 CHRONIC RIGHT SHOULDER PAIN: ICD-10-CM

## 2025-06-24 DIAGNOSIS — G89.29 CHRONIC PRIMARY MUSCULOSKELETAL PAIN: ICD-10-CM

## 2025-06-24 DIAGNOSIS — M79.18 CHRONIC PRIMARY MUSCULOSKELETAL PAIN: ICD-10-CM

## 2025-06-24 DIAGNOSIS — G89.29 CHRONIC RIGHT SHOULDER PAIN: ICD-10-CM

## 2025-06-24 PROCEDURE — 99213 OFFICE O/P EST LOW 20 MIN: CPT | Performed by: PHYSICAL MEDICINE & REHABILITATION

## 2025-06-24 PROCEDURE — 1036F TOBACCO NON-USER: CPT | Performed by: PHYSICAL MEDICINE & REHABILITATION

## 2025-06-24 PROCEDURE — G8427 DOCREV CUR MEDS BY ELIG CLIN: HCPCS | Performed by: PHYSICAL MEDICINE & REHABILITATION

## 2025-06-24 PROCEDURE — 3017F COLORECTAL CA SCREEN DOC REV: CPT | Performed by: PHYSICAL MEDICINE & REHABILITATION

## 2025-06-24 PROCEDURE — G8417 CALC BMI ABV UP PARAM F/U: HCPCS | Performed by: PHYSICAL MEDICINE & REHABILITATION

## 2025-06-24 RX ORDER — PREGABALIN 225 MG/1
225 CAPSULE ORAL NIGHTLY
Qty: 90 CAPSULE | Refills: 1 | Status: SHIPPED | OUTPATIENT
Start: 2025-06-24 | End: 2025-12-21

## 2025-06-24 RX ORDER — PREGABALIN 50 MG/1
50 CAPSULE ORAL 2 TIMES DAILY
Qty: 180 CAPSULE | Refills: 1 | Status: SHIPPED | OUTPATIENT
Start: 2025-06-24 | End: 2025-12-21

## 2025-06-24 RX ORDER — BACLOFEN 5 MG/1
5 TABLET ORAL 4 TIMES DAILY
Qty: 360 TABLET | Refills: 1 | Status: SHIPPED | OUTPATIENT
Start: 2025-06-24 | End: 2025-12-21

## 2025-06-24 ASSESSMENT — ENCOUNTER SYMPTOMS
WHEEZING: 0
NAUSEA: 0
SHORTNESS OF BREATH: 0
VOMITING: 0
TROUBLE SWALLOWING: 0
BACK PAIN: 1

## 2025-07-11 ENCOUNTER — OFFICE VISIT (OUTPATIENT)
Age: 60
End: 2025-07-11

## 2025-07-11 DIAGNOSIS — M25.561 ACUTE PAIN OF RIGHT KNEE: Primary | ICD-10-CM

## 2025-07-11 NOTE — PROGRESS NOTES
Lynn Cardona  1965   No chief complaint on file.       HISTORY OF PRESENT ILLNESS  Lynn Cardona is a 59 y.o. female who presents today for reevaluation of bilateral knee pain. She completed a Euflexxa on both knees in February. She had a cortisone injection in her right knee at her last OV 5/14/2025. This was helpful until she tweaked her knee a week ago. The knee is tender and swollen. She is also having an increase in left knee pain and is requesting an injection for the left knee today. Pain is a 4/10.    Patient denies any fever, chills, chest pain, shortness of breath or calf pain. The remainder of the review of systems is negative. There are no new illness or injuries other than that mentioned above to report since last seen in the office. No changes in medications, allergies, social or family history.      PHYSICAL EXAM:   There were no vitals taken for this visit.  The patient is a well-developed, well-nourished female   in no acute distress.  The patient is alert and oriented times three.  The patient is alert and oriented times three. Mood and affect are normal.  LYMPHATIC: lymph nodes are not enlarged and are within normal limits  SKIN: normal in color and non tender to palpation. There are no bruises or abrasions noted.   NEUROLOGICAL: Motor sensory exam is within normal limits. Reflexes are equal bilaterally. There is normal sensation to pinprick and light touch  MUSCULOSKELETAL:  Examination Left knee Right knee   Skin Intact Intact   Range of motion 5-115    Effusion + +   Medial joint line tenderness + +   Lateral joint line tenderness - +   Tenderness Pes Bursa - -   Tenderness insertion MCL - -   Tenderness insertion LCL - -   Nell’s - -   Patella crepitus + +   Patella grind + +   Lachman - -   Pivot shift - -   Anterior drawer - -   Posterior drawer - -   Varus stress - -   Valgus stress - -   Neurovascular Intact Intact   Calf Swelling and Tenderness to

## 2025-08-19 ENCOUNTER — OFFICE VISIT (OUTPATIENT)
Age: 60
End: 2025-08-19
Payer: COMMERCIAL

## 2025-08-19 DIAGNOSIS — M17.12 PRIMARY OSTEOARTHRITIS OF LEFT KNEE: ICD-10-CM

## 2025-08-19 DIAGNOSIS — G89.29 CHRONIC PAIN OF LEFT KNEE: ICD-10-CM

## 2025-08-19 DIAGNOSIS — M25.562 CHRONIC PAIN OF LEFT KNEE: ICD-10-CM

## 2025-08-19 DIAGNOSIS — M17.11 PRIMARY OSTEOARTHRITIS OF RIGHT KNEE: Primary | ICD-10-CM

## 2025-08-19 DIAGNOSIS — M25.561 ACUTE PAIN OF RIGHT KNEE: ICD-10-CM

## 2025-08-19 PROCEDURE — G8427 DOCREV CUR MEDS BY ELIG CLIN: HCPCS | Performed by: NURSE PRACTITIONER

## 2025-08-19 PROCEDURE — 1036F TOBACCO NON-USER: CPT | Performed by: NURSE PRACTITIONER

## 2025-08-19 PROCEDURE — G8417 CALC BMI ABV UP PARAM F/U: HCPCS | Performed by: NURSE PRACTITIONER

## 2025-08-19 PROCEDURE — 3017F COLORECTAL CA SCREEN DOC REV: CPT | Performed by: NURSE PRACTITIONER

## 2025-08-19 PROCEDURE — 99213 OFFICE O/P EST LOW 20 MIN: CPT | Performed by: NURSE PRACTITIONER

## 2025-09-04 ENCOUNTER — TELEMEDICINE (OUTPATIENT)
Age: 60
End: 2025-09-04

## 2025-09-04 DIAGNOSIS — M54.16 LUMBAR RADICULOPATHY: ICD-10-CM

## 2025-09-04 DIAGNOSIS — G89.29 CHRONIC PRIMARY MUSCULOSKELETAL PAIN: ICD-10-CM

## 2025-09-04 DIAGNOSIS — M43.16 SPONDYLOLISTHESIS OF LUMBAR REGION: ICD-10-CM

## 2025-09-04 DIAGNOSIS — M79.18 CHRONIC PRIMARY MUSCULOSKELETAL PAIN: ICD-10-CM

## 2025-09-04 DIAGNOSIS — M48.062 SPINAL STENOSIS OF LUMBAR REGION WITH NEUROGENIC CLAUDICATION: Primary | ICD-10-CM

## 2025-09-04 RX ORDER — PREGABALIN 225 MG/1
225 CAPSULE ORAL NIGHTLY
Qty: 90 CAPSULE | Refills: 1 | Status: SHIPPED | OUTPATIENT
Start: 2025-09-04 | End: 2026-03-03

## 2025-09-04 RX ORDER — OMEPRAZOLE 40 MG/1
CAPSULE, DELAYED RELEASE ORAL DAILY
COMMUNITY

## 2025-09-04 RX ORDER — PREGABALIN 50 MG/1
50 CAPSULE ORAL 2 TIMES DAILY
Qty: 180 CAPSULE | Refills: 1 | Status: SHIPPED | OUTPATIENT
Start: 2025-09-04 | End: 2026-03-03

## 2025-09-04 RX ORDER — SITAGLIPTIN 50 MG/1
50 TABLET, FILM COATED ORAL DAILY
COMMUNITY
Start: 2025-08-24

## 2025-09-04 RX ORDER — PHENTERMINE HYDROCHLORIDE 37.5 MG/1
TABLET ORAL
COMMUNITY
Start: 2025-09-01

## 2025-09-04 RX ORDER — BACLOFEN 10 MG/1
10 TABLET ORAL 4 TIMES DAILY
Qty: 360 TABLET | Refills: 1 | Status: SHIPPED | OUTPATIENT
Start: 2025-09-04 | End: 2026-03-03

## 2025-09-04 ASSESSMENT — ENCOUNTER SYMPTOMS
NAUSEA: 0
TROUBLE SWALLOWING: 0
VOMITING: 0
BACK PAIN: 1
SHORTNESS OF BREATH: 0
WHEEZING: 0

## (undated) DEVICE — BANDAGE ADH W0.75XL3IN UNIV WVN FAB NAT GEN USE STRP N ADH

## (undated) DEVICE — MEDI-VAC NON-CONDUCTIVE SUCTION TUBING: Brand: CARDINAL HEALTH

## (undated) DEVICE — SUTURE MCRYL SZ 4-0 L18IN ABSRB UD L19MM PS-2 3/8 CIR PRIM Y496G

## (undated) DEVICE — PREP SKN CHLRAPRP APL 26ML STR --

## (undated) DEVICE — SPONGE LAP 18X18IN STRL -- 5/PK

## (undated) DEVICE — 4-PORT MANIFOLD: Brand: NEPTUNE 2

## (undated) DEVICE — SUTURE FIBERWIRE SZ 2 W/ TAPERED NEEDLE BLUE L38IN NONABSORB BLU L26.5MM 1/2 CIRCLE AR7200

## (undated) DEVICE — SHOULDER SUSPENSION KIT 6 PER BOX

## (undated) DEVICE — BITE BLOCK ENDOSCP UNIV AD 6 TO 9.4 MM

## (undated) DEVICE — BASIN EMESIS 500CC ROSE 250/CS 60/PLT: Brand: MEDEGEN MEDICAL PRODUCTS, LLC

## (undated) DEVICE — BLANKET WRM AD W50XL85.8IN PACU FULL BODY FORC AIR

## (undated) DEVICE — TRAY MYEL SFTY +

## (undated) DEVICE — STERILE POLYISOPRENE POWDER-FREE SURGICAL GLOVES: Brand: PROTEXIS

## (undated) DEVICE — SOLUTION IRRIG 1000ML H2O STRL BLT

## (undated) DEVICE — SUTURE ABSORBABLE BRAIDED 0 CTXB 36 IN VIO PDS II ZB370

## (undated) DEVICE — MEDIA CONTRAST 10ML 200MG/ML 41%

## (undated) DEVICE — SYRINGE MED 25GA 3ML L5/8IN SUBQ PLAS W/ DETACH NDL SFTY

## (undated) DEVICE — GARMENT,MEDLINE,DVT,INT,CALF,MED, GEN2: Brand: MEDLINE

## (undated) DEVICE — AVANOS* SHORT BEVEL NEEDLE: Brand: AVANOS

## (undated) DEVICE — SOLUTION IRRIG 3000ML 0.9% SOD CHL FLX CONT 0797208] ICU MEDICAL INC]

## (undated) DEVICE — BRACE SHLDR M FA L135 145IN UNIV AIRMESH BRTH SLNG 15DEG

## (undated) DEVICE — NEEDLE SUT PASS FOR ROT CUF LABRAL REP MULTFI SCORPION

## (undated) DEVICE — SUTURE FIBERLINK SZ 2-0 L26IN NONABSORBABLE BLU CLS LOOP AR7235

## (undated) DEVICE — AIRLIFE™ NASAL OXYGEN CANNULA CURVED, FLARED TIP WITH 14 FOOT (4.3 M) CRUSH-RESISTANT TUBING, OVER-THE-EAR STYLE: Brand: AIRLIFE™

## (undated) DEVICE — FLEX ADVANTAGE 3000CC: Brand: FLEX ADVANTAGE

## (undated) DEVICE — CLEAR-TRAC 8.5 MM X 90 MM THREADED                                    CANNULA, WITH DISPOSABLE OBTURATOR,                                    GREEN, STERILE: Brand: CLEAR-TRAC

## (undated) DEVICE — CATHETER SUCT TR FL TIP 14FR W/ O CTRL

## (undated) DEVICE — SOFT SILICONE HYDROCELLULAR SACRUM DRESSING WITH LOCK AWAY LAYER: Brand: ALLEVYN LIFE SACRUM (LARGE) PACK OF 10

## (undated) DEVICE — SYR 10ML LUER LOK 1/5ML GRAD --

## (undated) DEVICE — DRAPE,REIN 53X77,STERILE: Brand: MEDLINE

## (undated) DEVICE — 90-S MAX, SUCTION PROBE, NON-BENDABLE, MAX CUT LEVEL 11: Brand: SERFAS ENERGY

## (undated) DEVICE — ICE BAG INSERTS

## (undated) DEVICE — SYRINGE MED 10ML LUERLOCK TIP W/O SFTY DISP

## (undated) DEVICE — PAD,NON-ADHERENT,3X8,STERILE,LF,1/PK: Brand: MEDLINE

## (undated) DEVICE — APPLICATOR BNDG 1MM ADH PREMIERPRO EXOFIN

## (undated) DEVICE — SUTURE VCRL SZ 3-0 L27IN ABSRB UD L36MM CT-1 1/2 CIR J258H

## (undated) DEVICE — SKIN MARKER,REGULAR TIP WITH RULER AND LABELS: Brand: DEVON

## (undated) DEVICE — MEDI-VAC SUCTION HIGH CAPACITY: Brand: CARDINAL HEALTH

## (undated) DEVICE — Device

## (undated) DEVICE — SYR 50ML SLIP TIP NSAF LF STRL --

## (undated) DEVICE — FLUFF AND POLYMER UNDERPAD,EXTRA HEAVY: Brand: WINGS

## (undated) DEVICE — ENDOSCOPY PUMP TUBING/ CAP SET: Brand: ERBE

## (undated) DEVICE — BLANKET WRM W40.2XL55.9IN IORT LO BODY + MISTRAL AIR

## (undated) DEVICE — BLADE SHV DIA4MM RED RESECT FOR GEN DEB REM OF PERIOST FR

## (undated) DEVICE — CANNULA THREADED FLEX 8.0 X 72MM: Brand: CLEAR-TRAC

## (undated) DEVICE — (D)NDL SPNE 22GX15CM -- DISC BY MFR W/NO SUB

## (undated) DEVICE — BUR SHV CUT HLLW 6 FLUT 5.5MM --

## (undated) DEVICE — NEEDLE SPNL 22GA L3.5IN BLK HUB S STL REG WALL FIT STYL W/

## (undated) DEVICE — PACK PROCEDURE SURG ORTH SHLDR CUST

## (undated) DEVICE — GAUZE,SPONGE,4"X4",16PLY,STRL,LF,10/TRAY: Brand: MEDLINE

## (undated) DEVICE — INFLOW CASSETTE TUBING, DO NOT USE IF PACKAGE IS DAMAGED: Brand: CROSSFLOW

## (undated) DEVICE — 90-S CRUISE, SUCTION PROBE, NON-BENDABLE, MAX CUT LEVEL 1: Brand: SERFAS ENERGY